# Patient Record
Sex: FEMALE | Race: WHITE | NOT HISPANIC OR LATINO | Employment: OTHER | URBAN - METROPOLITAN AREA
[De-identification: names, ages, dates, MRNs, and addresses within clinical notes are randomized per-mention and may not be internally consistent; named-entity substitution may affect disease eponyms.]

---

## 2017-02-26 ENCOUNTER — APPOINTMENT (OUTPATIENT)
Dept: CT IMAGING | Facility: HOSPITAL | Age: 82
End: 2017-02-26

## 2017-02-26 ENCOUNTER — HOSPITAL ENCOUNTER (OUTPATIENT)
Facility: HOSPITAL | Age: 82
Setting detail: OBSERVATION
Discharge: HOME | End: 2017-02-27
Attending: EMERGENCY MEDICINE | Admitting: HOSPITALIST

## 2017-02-26 DIAGNOSIS — I10 ESSENTIAL HYPERTENSION: Primary | ICD-10-CM

## 2017-02-26 DIAGNOSIS — R51.9 PERSISTENT HEADACHES: ICD-10-CM

## 2017-02-26 LAB
ALBUMIN SERPL-MCNC: 3.9 G/DL (ref 3.5–5.2)
ALBUMIN/GLOB SERPL: 1.4 G/DL
ALP SERPL-CCNC: 93 U/L (ref 39–117)
ALT SERPL W P-5'-P-CCNC: 33 U/L (ref 1–33)
ANION GAP SERPL CALCULATED.3IONS-SCNC: 12 MMOL/L
AST SERPL-CCNC: 25 U/L (ref 1–32)
BASOPHILS # BLD AUTO: 0.02 10*3/MM3 (ref 0–0.2)
BASOPHILS NFR BLD AUTO: 0.3 % (ref 0–1.5)
BILIRUB SERPL-MCNC: <0.2 MG/DL (ref 0.1–1.2)
BUN BLD-MCNC: 34 MG/DL (ref 8–23)
BUN/CREAT SERPL: 30.9 (ref 7–25)
CALCIUM SPEC-SCNC: 9.5 MG/DL (ref 8.2–9.6)
CHLORIDE SERPL-SCNC: 104 MMOL/L (ref 98–107)
CO2 SERPL-SCNC: 26 MMOL/L (ref 22–29)
CREAT BLD-MCNC: 1.1 MG/DL (ref 0.57–1)
DEPRECATED RDW RBC AUTO: 52.4 FL (ref 37–54)
EOSINOPHIL # BLD AUTO: 0.15 10*3/MM3 (ref 0–0.7)
EOSINOPHIL NFR BLD AUTO: 2.1 % (ref 0.3–6.2)
ERYTHROCYTE [DISTWIDTH] IN BLOOD BY AUTOMATED COUNT: 14.1 % (ref 11.7–13)
GFR SERPL CREATININE-BSD FRML MDRD: 46 ML/MIN/1.73
GLOBULIN UR ELPH-MCNC: 2.7 GM/DL
GLUCOSE BLD-MCNC: 105 MG/DL (ref 65–99)
HCT VFR BLD AUTO: 34.5 % (ref 35.6–45.5)
HGB BLD-MCNC: 11.3 G/DL (ref 11.9–15.5)
IMM GRANULOCYTES # BLD: 0.02 10*3/MM3 (ref 0–0.03)
IMM GRANULOCYTES NFR BLD: 0.3 % (ref 0–0.5)
LYMPHOCYTES # BLD AUTO: 2.37 10*3/MM3 (ref 0.9–4.8)
LYMPHOCYTES NFR BLD AUTO: 33.4 % (ref 19.6–45.3)
MCH RBC QN AUTO: 33.1 PG (ref 26.9–32)
MCHC RBC AUTO-ENTMCNC: 32.8 G/DL (ref 32.4–36.3)
MCV RBC AUTO: 101.2 FL (ref 80.5–98.2)
MONOCYTES # BLD AUTO: 0.49 10*3/MM3 (ref 0.2–1.2)
MONOCYTES NFR BLD AUTO: 6.9 % (ref 5–12)
NEUTROPHILS # BLD AUTO: 4.05 10*3/MM3 (ref 1.9–8.1)
NEUTROPHILS NFR BLD AUTO: 57 % (ref 42.7–76)
PLATELET # BLD AUTO: 193 10*3/MM3 (ref 140–500)
PMV BLD AUTO: 10.4 FL (ref 6–12)
POTASSIUM BLD-SCNC: 4.7 MMOL/L (ref 3.5–5.2)
PROT SERPL-MCNC: 6.6 G/DL (ref 6–8.5)
RBC # BLD AUTO: 3.41 10*6/MM3 (ref 3.9–5.2)
SODIUM BLD-SCNC: 142 MMOL/L (ref 136–145)
TROPONIN T SERPL-MCNC: <0.01 NG/ML (ref 0–0.03)
WBC NRBC COR # BLD: 7.1 10*3/MM3 (ref 4.5–10.7)

## 2017-02-26 PROCEDURE — G0378 HOSPITAL OBSERVATION PER HR: HCPCS

## 2017-02-26 PROCEDURE — 84484 ASSAY OF TROPONIN QUANT: CPT | Performed by: EMERGENCY MEDICINE

## 2017-02-26 PROCEDURE — 96374 THER/PROPH/DIAG INJ IV PUSH: CPT

## 2017-02-26 PROCEDURE — 80053 COMPREHEN METABOLIC PANEL: CPT | Performed by: EMERGENCY MEDICINE

## 2017-02-26 PROCEDURE — 70450 CT HEAD/BRAIN W/O DYE: CPT

## 2017-02-26 PROCEDURE — 85025 COMPLETE CBC W/AUTO DIFF WBC: CPT | Performed by: EMERGENCY MEDICINE

## 2017-02-26 PROCEDURE — 99284 EMERGENCY DEPT VISIT MOD MDM: CPT

## 2017-02-26 RX ORDER — GUAIFENESIN 600 MG/1
600 TABLET, EXTENDED RELEASE ORAL 2 TIMES DAILY
COMMUNITY

## 2017-02-26 RX ORDER — LABETALOL HYDROCHLORIDE 5 MG/ML
10 INJECTION, SOLUTION INTRAVENOUS ONCE
Status: COMPLETED | OUTPATIENT
Start: 2017-02-26 | End: 2017-02-26

## 2017-02-26 RX ORDER — MELATONIN
1000
COMMUNITY

## 2017-02-26 RX ORDER — LEVOTHYROXINE SODIUM 0.03 MG/1
75 TABLET ORAL DAILY
COMMUNITY
End: 2020-11-05

## 2017-02-26 RX ORDER — ISOSORBIDE MONONITRATE 60 MG/1
60 TABLET, EXTENDED RELEASE ORAL DAILY
COMMUNITY
End: 2019-09-06 | Stop reason: HOSPADM

## 2017-02-26 RX ORDER — OLMESARTAN MEDOXOMIL 40 MG/1
40 TABLET ORAL DAILY
COMMUNITY
End: 2019-09-24 | Stop reason: HOSPADM

## 2017-02-26 RX ADMIN — LABETALOL HYDROCHLORIDE 10 MG: 5 INJECTION, SOLUTION INTRAVENOUS at 20:52

## 2017-02-27 VITALS
WEIGHT: 103.5 LBS | HEART RATE: 71 BPM | BODY MASS INDEX: 17.25 KG/M2 | HEIGHT: 65 IN | TEMPERATURE: 97.7 F | RESPIRATION RATE: 18 BRPM | DIASTOLIC BLOOD PRESSURE: 71 MMHG | OXYGEN SATURATION: 98 % | SYSTOLIC BLOOD PRESSURE: 155 MMHG

## 2017-02-27 LAB
ANION GAP SERPL CALCULATED.3IONS-SCNC: 14.1 MMOL/L
BUN BLD-MCNC: 30 MG/DL (ref 8–23)
BUN/CREAT SERPL: 28 (ref 7–25)
CALCIUM SPEC-SCNC: 9.3 MG/DL (ref 8.2–9.6)
CHLORIDE SERPL-SCNC: 106 MMOL/L (ref 98–107)
CO2 SERPL-SCNC: 22.9 MMOL/L (ref 22–29)
CREAT BLD-MCNC: 1.07 MG/DL (ref 0.57–1)
DEPRECATED RDW RBC AUTO: 49.7 FL (ref 37–54)
ERYTHROCYTE [DISTWIDTH] IN BLOOD BY AUTOMATED COUNT: 14.3 % (ref 11.7–13)
GFR SERPL CREATININE-BSD FRML MDRD: 48 ML/MIN/1.73
GLUCOSE BLD-MCNC: 94 MG/DL (ref 65–99)
HCT VFR BLD AUTO: 32 % (ref 35.6–45.5)
HGB BLD-MCNC: 10.7 G/DL (ref 11.9–15.5)
MCH RBC QN AUTO: 32 PG (ref 26.9–32)
MCHC RBC AUTO-ENTMCNC: 33.4 G/DL (ref 32.4–36.3)
MCV RBC AUTO: 95.8 FL (ref 80.5–98.2)
PLATELET # BLD AUTO: 183 10*3/MM3 (ref 140–500)
PMV BLD AUTO: 10.6 FL (ref 6–12)
POTASSIUM BLD-SCNC: 4.2 MMOL/L (ref 3.5–5.2)
RBC # BLD AUTO: 3.34 10*6/MM3 (ref 3.9–5.2)
SODIUM BLD-SCNC: 143 MMOL/L (ref 136–145)
WBC NRBC COR # BLD: 8.9 10*3/MM3 (ref 4.5–10.7)

## 2017-02-27 PROCEDURE — G0378 HOSPITAL OBSERVATION PER HR: HCPCS

## 2017-02-27 PROCEDURE — 80048 BASIC METABOLIC PNL TOTAL CA: CPT | Performed by: HOSPITALIST

## 2017-02-27 PROCEDURE — 25010000002 HYDRALAZINE PER 20 MG: Performed by: HOSPITALIST

## 2017-02-27 PROCEDURE — 96375 TX/PRO/DX INJ NEW DRUG ADDON: CPT

## 2017-02-27 PROCEDURE — 85027 COMPLETE CBC AUTOMATED: CPT | Performed by: HOSPITALIST

## 2017-02-27 RX ORDER — METOPROLOL SUCCINATE 25 MG/1
25 TABLET, EXTENDED RELEASE ORAL
Status: DISCONTINUED | OUTPATIENT
Start: 2017-02-27 | End: 2017-02-27 | Stop reason: HOSPADM

## 2017-02-27 RX ORDER — PANTOPRAZOLE SODIUM 40 MG/1
40 TABLET, DELAYED RELEASE ORAL
Status: DISCONTINUED | OUTPATIENT
Start: 2017-02-28 | End: 2017-02-27 | Stop reason: HOSPADM

## 2017-02-27 RX ORDER — ISOSORBIDE MONONITRATE 60 MG/1
60 TABLET, EXTENDED RELEASE ORAL DAILY
Status: DISCONTINUED | OUTPATIENT
Start: 2017-02-27 | End: 2017-02-27 | Stop reason: HOSPADM

## 2017-02-27 RX ORDER — DIPHENOXYLATE HYDROCHLORIDE AND ATROPINE SULFATE 2.5; .025 MG/1; MG/1
1 TABLET ORAL NIGHTLY
Status: DISCONTINUED | OUTPATIENT
Start: 2017-02-27 | End: 2017-02-27 | Stop reason: HOSPADM

## 2017-02-27 RX ORDER — ASPIRIN 81 MG/1
81 TABLET ORAL DAILY
Status: DISCONTINUED | OUTPATIENT
Start: 2017-02-27 | End: 2017-02-27 | Stop reason: HOSPADM

## 2017-02-27 RX ORDER — CLOPIDOGREL BISULFATE 75 MG/1
75 TABLET ORAL DAILY
Status: DISCONTINUED | OUTPATIENT
Start: 2017-02-27 | End: 2017-02-27 | Stop reason: HOSPADM

## 2017-02-27 RX ORDER — AMLODIPINE BESYLATE 10 MG/1
10 TABLET ORAL
Qty: 30 TABLET | Refills: 0 | Status: SHIPPED | OUTPATIENT
Start: 2017-02-27 | End: 2017-03-29

## 2017-02-27 RX ORDER — OLMESARTAN MEDOXOMIL 20 MG/1
40 TABLET ORAL DAILY
Status: DISCONTINUED | OUTPATIENT
Start: 2017-02-27 | End: 2017-02-27 | Stop reason: HOSPADM

## 2017-02-27 RX ORDER — LEVOTHYROXINE SODIUM 0.03 MG/1
12.5 TABLET ORAL DAILY
Status: DISCONTINUED | OUTPATIENT
Start: 2017-02-27 | End: 2017-02-27 | Stop reason: HOSPADM

## 2017-02-27 RX ORDER — CLONIDINE HYDROCHLORIDE 0.1 MG/1
0.1 TABLET ORAL EVERY 4 HOURS PRN
Status: DISCONTINUED | OUTPATIENT
Start: 2017-02-27 | End: 2017-02-27

## 2017-02-27 RX ORDER — FERROUS SULFATE 325(65) MG
325 TABLET ORAL
Status: DISCONTINUED | OUTPATIENT
Start: 2017-02-27 | End: 2017-02-27 | Stop reason: HOSPADM

## 2017-02-27 RX ORDER — HYDRALAZINE HYDROCHLORIDE 20 MG/ML
10 INJECTION INTRAMUSCULAR; INTRAVENOUS EVERY 6 HOURS PRN
Status: DISCONTINUED | OUTPATIENT
Start: 2017-02-27 | End: 2017-02-27

## 2017-02-27 RX ORDER — LABETALOL HYDROCHLORIDE 5 MG/ML
10 INJECTION, SOLUTION INTRAVENOUS ONCE
Status: DISCONTINUED | OUTPATIENT
Start: 2017-02-27 | End: 2017-02-27

## 2017-02-27 RX ORDER — HYDRALAZINE HYDROCHLORIDE 20 MG/ML
10 INJECTION INTRAMUSCULAR; INTRAVENOUS EVERY 4 HOURS PRN
Status: DISCONTINUED | OUTPATIENT
Start: 2017-02-27 | End: 2017-02-27

## 2017-02-27 RX ORDER — OLMESARTAN MEDOXOMIL AND HYDROCHLOROTHIAZIDE 40/12.5 40; 12.5 MG/1; MG/1
1 TABLET ORAL
Status: DISCONTINUED | OUTPATIENT
Start: 2017-02-27 | End: 2017-02-27

## 2017-02-27 RX ORDER — MAGNESIUM OXIDE 400 MG/1
400 TABLET ORAL DAILY
Status: DISCONTINUED | OUTPATIENT
Start: 2017-02-27 | End: 2017-02-27 | Stop reason: HOSPADM

## 2017-02-27 RX ORDER — MONTELUKAST SODIUM 10 MG/1
10 TABLET ORAL NIGHTLY
Status: DISCONTINUED | OUTPATIENT
Start: 2017-02-27 | End: 2017-02-27 | Stop reason: HOSPADM

## 2017-02-27 RX ORDER — ACETAMINOPHEN 325 MG/1
650 TABLET ORAL EVERY 4 HOURS PRN
Status: DISCONTINUED | OUTPATIENT
Start: 2017-02-27 | End: 2017-02-27 | Stop reason: HOSPADM

## 2017-02-27 RX ORDER — MELATONIN
1000 DAILY
Status: DISCONTINUED | OUTPATIENT
Start: 2017-02-27 | End: 2017-02-27 | Stop reason: HOSPADM

## 2017-02-27 RX ORDER — GUAIFENESIN 600 MG/1
600 TABLET, EXTENDED RELEASE ORAL 2 TIMES DAILY
Status: DISCONTINUED | OUTPATIENT
Start: 2017-02-27 | End: 2017-02-27 | Stop reason: HOSPADM

## 2017-02-27 RX ORDER — AMLODIPINE BESYLATE 10 MG/1
10 TABLET ORAL
Status: DISCONTINUED | OUTPATIENT
Start: 2017-02-27 | End: 2017-02-27 | Stop reason: HOSPADM

## 2017-02-27 RX ORDER — METOPROLOL SUCCINATE 25 MG/1
25 TABLET, EXTENDED RELEASE ORAL
Qty: 30 TABLET | Refills: 0 | Status: SHIPPED | OUTPATIENT
Start: 2017-02-27 | End: 2017-03-29

## 2017-02-27 RX ADMIN — ACETAMINOPHEN 650 MG: 325 TABLET ORAL at 02:19

## 2017-02-27 RX ADMIN — AMLODIPINE BESYLATE 10 MG: 10 TABLET ORAL at 08:50

## 2017-02-27 RX ADMIN — METOPROLOL SUCCINATE 25 MG: 25 TABLET, FILM COATED, EXTENDED RELEASE ORAL at 08:50

## 2017-02-27 RX ADMIN — HYDRALAZINE HYDROCHLORIDE 10 MG: 20 INJECTION INTRAMUSCULAR; INTRAVENOUS at 00:37

## 2017-02-27 RX ADMIN — ACETAMINOPHEN 650 MG: 325 TABLET ORAL at 08:55

## 2017-02-27 RX ADMIN — OLMESARTAN MEDOXOMIL-HYDROCHLOROTHIAZIDE 1 TABLET: 40; 12.5 TABLET, FILM COATED ORAL at 08:50

## 2017-04-26 ENCOUNTER — HOSPITAL ENCOUNTER (EMERGENCY)
Facility: HOSPITAL | Age: 82
Discharge: HOME | End: 2017-04-27
Attending: EMERGENCY MEDICINE | Admitting: EMERGENCY MEDICINE

## 2017-04-26 ENCOUNTER — APPOINTMENT (OUTPATIENT)
Dept: CT IMAGING | Facility: HOSPITAL | Age: 82
End: 2017-04-26

## 2017-04-26 DIAGNOSIS — K52.9 ENTERITIS: Primary | ICD-10-CM

## 2017-04-26 LAB
ALBUMIN SERPL-MCNC: 3.1 G/DL (ref 3.5–5.2)
ALBUMIN/GLOB SERPL: 0.9 G/DL
ALP SERPL-CCNC: 66 U/L (ref 39–117)
ALT SERPL W P-5'-P-CCNC: 10 U/L (ref 1–33)
ANION GAP SERPL CALCULATED.3IONS-SCNC: 12.9 MMOL/L
AST SERPL-CCNC: 19 U/L (ref 1–32)
BASOPHILS # BLD AUTO: 0.04 10*3/MM3 (ref 0–0.2)
BASOPHILS NFR BLD AUTO: 0.5 % (ref 0–1.5)
BILIRUB SERPL-MCNC: <0.2 MG/DL (ref 0.1–1.2)
BILIRUB UR QL STRIP: NEGATIVE
BUN BLD-MCNC: 41 MG/DL (ref 8–23)
BUN/CREAT SERPL: 36.9 (ref 7–25)
CALCIUM SPEC-SCNC: 9.7 MG/DL (ref 8.2–9.6)
CHLORIDE SERPL-SCNC: 102 MMOL/L (ref 98–107)
CLARITY UR: CLEAR
CO2 SERPL-SCNC: 23.1 MMOL/L (ref 22–29)
COLOR UR: YELLOW
CREAT BLD-MCNC: 1.11 MG/DL (ref 0.57–1)
DEPRECATED RDW RBC AUTO: 53.3 FL (ref 37–54)
EOSINOPHIL # BLD AUTO: 0.15 10*3/MM3 (ref 0–0.7)
EOSINOPHIL NFR BLD AUTO: 1.7 % (ref 0.3–6.2)
ERYTHROCYTE [DISTWIDTH] IN BLOOD BY AUTOMATED COUNT: 14.8 % (ref 11.7–13)
GFR SERPL CREATININE-BSD FRML MDRD: 46 ML/MIN/1.73
GLOBULIN UR ELPH-MCNC: 3.5 GM/DL
GLUCOSE BLD-MCNC: 119 MG/DL (ref 65–99)
GLUCOSE UR STRIP-MCNC: NEGATIVE MG/DL
HCT VFR BLD AUTO: 31.9 % (ref 35.6–45.5)
HGB BLD-MCNC: 10.3 G/DL (ref 11.9–15.5)
HGB UR QL STRIP.AUTO: NEGATIVE
IMM GRANULOCYTES # BLD: 0.02 10*3/MM3 (ref 0–0.03)
IMM GRANULOCYTES NFR BLD: 0.2 % (ref 0–0.5)
KETONES UR QL STRIP: NEGATIVE
LEUKOCYTE ESTERASE UR QL STRIP.AUTO: NEGATIVE
LIPASE SERPL-CCNC: 70 U/L (ref 13–60)
LYMPHOCYTES # BLD AUTO: 2.91 10*3/MM3 (ref 0.9–4.8)
LYMPHOCYTES NFR BLD AUTO: 33.4 % (ref 19.6–45.3)
MCH RBC QN AUTO: 32 PG (ref 26.9–32)
MCHC RBC AUTO-ENTMCNC: 32.3 G/DL (ref 32.4–36.3)
MCV RBC AUTO: 99.1 FL (ref 80.5–98.2)
MONOCYTES # BLD AUTO: 0.77 10*3/MM3 (ref 0.2–1.2)
MONOCYTES NFR BLD AUTO: 8.8 % (ref 5–12)
NEUTROPHILS # BLD AUTO: 4.83 10*3/MM3 (ref 1.9–8.1)
NEUTROPHILS NFR BLD AUTO: 55.4 % (ref 42.7–76)
NITRITE UR QL STRIP: NEGATIVE
PH UR STRIP.AUTO: 5.5 [PH] (ref 5–8)
PLATELET # BLD AUTO: 225 10*3/MM3 (ref 140–500)
PMV BLD AUTO: 10.3 FL (ref 6–12)
POTASSIUM BLD-SCNC: 4.7 MMOL/L (ref 3.5–5.2)
PROT SERPL-MCNC: 6.6 G/DL (ref 6–8.5)
PROT UR QL STRIP: NEGATIVE
RBC # BLD AUTO: 3.22 10*6/MM3 (ref 3.9–5.2)
SODIUM BLD-SCNC: 138 MMOL/L (ref 136–145)
SP GR UR STRIP: 1.01 (ref 1–1.03)
UROBILINOGEN UR QL STRIP: NORMAL
WBC NRBC COR # BLD: 8.72 10*3/MM3 (ref 4.5–10.7)

## 2017-04-26 PROCEDURE — 36415 COLL VENOUS BLD VENIPUNCTURE: CPT | Performed by: EMERGENCY MEDICINE

## 2017-04-26 PROCEDURE — 99283 EMERGENCY DEPT VISIT LOW MDM: CPT

## 2017-04-26 PROCEDURE — 85025 COMPLETE CBC W/AUTO DIFF WBC: CPT | Performed by: EMERGENCY MEDICINE

## 2017-04-26 PROCEDURE — 74176 CT ABD & PELVIS W/O CONTRAST: CPT

## 2017-04-26 PROCEDURE — 81003 URINALYSIS AUTO W/O SCOPE: CPT | Performed by: EMERGENCY MEDICINE

## 2017-04-26 PROCEDURE — 80053 COMPREHEN METABOLIC PANEL: CPT | Performed by: EMERGENCY MEDICINE

## 2017-04-26 PROCEDURE — 83690 ASSAY OF LIPASE: CPT | Performed by: EMERGENCY MEDICINE

## 2017-04-26 RX ORDER — SODIUM CHLORIDE 0.9 % (FLUSH) 0.9 %
10 SYRINGE (ML) INJECTION AS NEEDED
Status: DISCONTINUED | OUTPATIENT
Start: 2017-04-26 | End: 2017-04-27 | Stop reason: HOSPADM

## 2017-04-26 RX ORDER — AMLODIPINE BESYLATE 10 MG/1
10 TABLET ORAL EVERY EVENING
COMMUNITY
End: 2019-09-24 | Stop reason: HOSPADM

## 2017-04-27 VITALS
TEMPERATURE: 97.8 F | OXYGEN SATURATION: 97 % | DIASTOLIC BLOOD PRESSURE: 80 MMHG | SYSTOLIC BLOOD PRESSURE: 166 MMHG | HEART RATE: 69 BPM | BODY MASS INDEX: 17.75 KG/M2 | HEIGHT: 64 IN | WEIGHT: 104 LBS | RESPIRATION RATE: 16 BRPM

## 2017-05-03 ENCOUNTER — INPATIENT HOSPITAL (OUTPATIENT)
Dept: URBAN - METROPOLITAN AREA HOSPITAL 107 | Facility: HOSPITAL | Age: 82
End: 2017-05-03
Payer: COMMERCIAL

## 2017-05-03 DIAGNOSIS — K21.9 GASTRO-ESOPHAGEAL REFLUX DISEASE WITHOUT ESOPHAGITIS: ICD-10-CM

## 2017-05-03 DIAGNOSIS — R94.5 ABNORMAL RESULTS OF LIVER FUNCTION STUDIES: ICD-10-CM

## 2017-05-03 DIAGNOSIS — R07.89 OTHER CHEST PAIN: ICD-10-CM

## 2017-05-03 DIAGNOSIS — D50.9 IRON DEFICIENCY ANEMIA, UNSPECIFIED: ICD-10-CM

## 2017-05-03 PROCEDURE — 99223 1ST HOSP IP/OBS HIGH 75: CPT | Performed by: INTERNAL MEDICINE

## 2017-05-04 ENCOUNTER — INPATIENT HOSPITAL (OUTPATIENT)
Dept: URBAN - METROPOLITAN AREA HOSPITAL 107 | Facility: HOSPITAL | Age: 82
End: 2017-05-04
Payer: COMMERCIAL

## 2017-05-04 DIAGNOSIS — R07.89 OTHER CHEST PAIN: ICD-10-CM

## 2017-05-04 PROCEDURE — 99231 SBSQ HOSP IP/OBS SF/LOW 25: CPT | Performed by: PHYSICIAN ASSISTANT

## 2017-05-05 ENCOUNTER — INPATIENT HOSPITAL (OUTPATIENT)
Dept: URBAN - METROPOLITAN AREA HOSPITAL 107 | Facility: HOSPITAL | Age: 82
End: 2017-05-05
Payer: COMMERCIAL

## 2017-05-05 DIAGNOSIS — K21.9 GASTRO-ESOPHAGEAL REFLUX DISEASE WITHOUT ESOPHAGITIS: ICD-10-CM

## 2017-05-05 DIAGNOSIS — R07.89 OTHER CHEST PAIN: ICD-10-CM

## 2017-05-05 PROCEDURE — 99232 SBSQ HOSP IP/OBS MODERATE 35: CPT | Performed by: INTERNAL MEDICINE

## 2019-09-01 ENCOUNTER — HOSPITAL ENCOUNTER (INPATIENT)
Facility: HOSPITAL | Age: 84
LOS: 5 days | Discharge: NURSING, REHAB OR LONG-TERM CARE | End: 2019-09-06
Attending: EMERGENCY MEDICINE | Admitting: HOSPITALIST

## 2019-09-01 ENCOUNTER — APPOINTMENT (OUTPATIENT)
Dept: CT IMAGING | Facility: HOSPITAL | Age: 84
End: 2019-09-01

## 2019-09-01 ENCOUNTER — APPOINTMENT (OUTPATIENT)
Dept: GENERAL RADIOLOGY | Facility: HOSPITAL | Age: 84
End: 2019-09-01

## 2019-09-01 DIAGNOSIS — J18.9 PNEUMONIA OF RIGHT LOWER LOBE DUE TO INFECTIOUS ORGANISM: Primary | ICD-10-CM

## 2019-09-01 LAB
ALBUMIN SERPL-MCNC: 3.5 G/DL (ref 3.5–5.2)
ALBUMIN/GLOB SERPL: 1.3 G/DL
ALP SERPL-CCNC: 77 U/L (ref 39–117)
ALT SERPL W P-5'-P-CCNC: 8 U/L (ref 1–33)
ANION GAP SERPL CALCULATED.3IONS-SCNC: 13.5 MMOL/L (ref 5–15)
AST SERPL-CCNC: 18 U/L (ref 1–32)
BASOPHILS # BLD AUTO: 0.05 10*3/MM3 (ref 0–0.2)
BASOPHILS NFR BLD AUTO: 0.3 % (ref 0–1.5)
BILIRUB SERPL-MCNC: 0.3 MG/DL (ref 0.2–1.2)
BILIRUB UR QL STRIP: NEGATIVE
BUN BLD-MCNC: 34 MG/DL (ref 8–23)
BUN/CREAT SERPL: 27.6 (ref 7–25)
CALCIUM SPEC-SCNC: 9.7 MG/DL (ref 8.2–9.6)
CHLORIDE SERPL-SCNC: 105 MMOL/L (ref 98–107)
CLARITY UR: CLEAR
CO2 SERPL-SCNC: 21.5 MMOL/L (ref 22–29)
COLOR UR: YELLOW
CREAT BLD-MCNC: 1.23 MG/DL (ref 0.57–1)
D-LACTATE SERPL-SCNC: 1.2 MMOL/L (ref 0.5–2)
DEPRECATED RDW RBC AUTO: 53.7 FL (ref 37–54)
EOSINOPHIL # BLD AUTO: 0.04 10*3/MM3 (ref 0–0.4)
EOSINOPHIL NFR BLD AUTO: 0.3 % (ref 0.3–6.2)
ERYTHROCYTE [DISTWIDTH] IN BLOOD BY AUTOMATED COUNT: 14.1 % (ref 12.3–15.4)
GFR SERPL CREATININE-BSD FRML MDRD: 40 ML/MIN/1.73
GLOBULIN UR ELPH-MCNC: 2.8 GM/DL
GLUCOSE BLD-MCNC: 120 MG/DL (ref 65–99)
GLUCOSE UR STRIP-MCNC: NEGATIVE MG/DL
HCT VFR BLD AUTO: 34.4 % (ref 34–46.6)
HGB BLD-MCNC: 11 G/DL (ref 12–15.9)
HGB UR QL STRIP.AUTO: NEGATIVE
IMM GRANULOCYTES # BLD AUTO: 0.07 10*3/MM3 (ref 0–0.05)
IMM GRANULOCYTES NFR BLD AUTO: 0.5 % (ref 0–0.5)
KETONES UR QL STRIP: ABNORMAL
LEUKOCYTE ESTERASE UR QL STRIP.AUTO: NEGATIVE
LYMPHOCYTES # BLD AUTO: 2.39 10*3/MM3 (ref 0.7–3.1)
LYMPHOCYTES NFR BLD AUTO: 15.8 % (ref 19.6–45.3)
MCH RBC QN AUTO: 32.8 PG (ref 26.6–33)
MCHC RBC AUTO-ENTMCNC: 32 G/DL (ref 31.5–35.7)
MCV RBC AUTO: 102.7 FL (ref 79–97)
MONOCYTES # BLD AUTO: 1.05 10*3/MM3 (ref 0.1–0.9)
MONOCYTES NFR BLD AUTO: 6.9 % (ref 5–12)
NEUTROPHILS # BLD AUTO: 11.57 10*3/MM3 (ref 1.7–7)
NEUTROPHILS NFR BLD AUTO: 76.2 % (ref 42.7–76)
NITRITE UR QL STRIP: NEGATIVE
NRBC BLD AUTO-RTO: 0 /100 WBC (ref 0–0.2)
PH UR STRIP.AUTO: <=5 [PH] (ref 5–8)
PLATELET # BLD AUTO: 173 10*3/MM3 (ref 140–450)
PMV BLD AUTO: 10.9 FL (ref 6–12)
POTASSIUM BLD-SCNC: 4.7 MMOL/L (ref 3.5–5.2)
PROCALCITONIN SERPL-MCNC: 0.12 NG/ML (ref 0.1–0.25)
PROT SERPL-MCNC: 6.3 G/DL (ref 6–8.5)
PROT UR QL STRIP: NEGATIVE
RBC # BLD AUTO: 3.35 10*6/MM3 (ref 3.77–5.28)
SODIUM BLD-SCNC: 140 MMOL/L (ref 136–145)
SP GR UR STRIP: 1.01 (ref 1–1.03)
UROBILINOGEN UR QL STRIP: ABNORMAL
WBC NRBC COR # BLD: 15.17 10*3/MM3 (ref 3.4–10.8)

## 2019-09-01 PROCEDURE — 84145 PROCALCITONIN (PCT): CPT | Performed by: EMERGENCY MEDICINE

## 2019-09-01 PROCEDURE — 87040 BLOOD CULTURE FOR BACTERIA: CPT | Performed by: EMERGENCY MEDICINE

## 2019-09-01 PROCEDURE — 25010000002 LEVOFLOXACIN PER 250 MG: Performed by: EMERGENCY MEDICINE

## 2019-09-01 PROCEDURE — 80053 COMPREHEN METABOLIC PANEL: CPT | Performed by: EMERGENCY MEDICINE

## 2019-09-01 PROCEDURE — 83605 ASSAY OF LACTIC ACID: CPT | Performed by: EMERGENCY MEDICINE

## 2019-09-01 PROCEDURE — 71045 X-RAY EXAM CHEST 1 VIEW: CPT

## 2019-09-01 PROCEDURE — 85025 COMPLETE CBC W/AUTO DIFF WBC: CPT | Performed by: EMERGENCY MEDICINE

## 2019-09-01 PROCEDURE — 74176 CT ABD & PELVIS W/O CONTRAST: CPT

## 2019-09-01 PROCEDURE — 81003 URINALYSIS AUTO W/O SCOPE: CPT | Performed by: EMERGENCY MEDICINE

## 2019-09-01 PROCEDURE — 99284 EMERGENCY DEPT VISIT MOD MDM: CPT

## 2019-09-01 RX ORDER — CHOLECALCIFEROL (VITAMIN D3) 125 MCG
10 CAPSULE ORAL NIGHTLY
COMMUNITY

## 2019-09-01 RX ORDER — LEVOFLOXACIN 5 MG/ML
500 INJECTION, SOLUTION INTRAVENOUS ONCE
Status: COMPLETED | OUTPATIENT
Start: 2019-09-01 | End: 2019-09-01

## 2019-09-01 RX ORDER — MONTELUKAST SODIUM 10 MG/1
10 TABLET ORAL NIGHTLY
Status: DISCONTINUED | OUTPATIENT
Start: 2019-09-01 | End: 2019-09-06 | Stop reason: HOSPADM

## 2019-09-01 RX ORDER — SODIUM CHLORIDE 9 MG/ML
75 INJECTION, SOLUTION INTRAVENOUS CONTINUOUS
Status: DISCONTINUED | OUTPATIENT
Start: 2019-09-01 | End: 2019-09-03

## 2019-09-01 RX ORDER — L.RHAMNOSUS/B.ANIMALIS(LACTIS) 3B CELL
1 CAPSULE ORAL DAILY
COMMUNITY

## 2019-09-01 RX ORDER — LEVOFLOXACIN 5 MG/ML
750 INJECTION, SOLUTION INTRAVENOUS
Status: DISCONTINUED | OUTPATIENT
Start: 2019-09-03 | End: 2019-09-06

## 2019-09-01 RX ORDER — LORATADINE 10 MG/1
1 CAPSULE, LIQUID FILLED ORAL DAILY
COMMUNITY
End: 2019-09-06 | Stop reason: HOSPADM

## 2019-09-01 RX ORDER — PANTOPRAZOLE SODIUM 40 MG/1
40 TABLET, DELAYED RELEASE ORAL
Status: DISCONTINUED | OUTPATIENT
Start: 2019-09-02 | End: 2019-09-06 | Stop reason: HOSPADM

## 2019-09-01 RX ORDER — CHOLECALCIFEROL (VITAMIN D3) 125 MCG
5 CAPSULE ORAL NIGHTLY PRN
Status: DISCONTINUED | OUTPATIENT
Start: 2019-09-01 | End: 2019-09-02

## 2019-09-01 RX ORDER — ACETAMINOPHEN 500 MG
1000 TABLET ORAL ONCE
Status: COMPLETED | OUTPATIENT
Start: 2019-09-01 | End: 2019-09-01

## 2019-09-01 RX ORDER — SODIUM CHLORIDE 0.9 % (FLUSH) 0.9 %
10 SYRINGE (ML) INJECTION AS NEEDED
Status: DISCONTINUED | OUTPATIENT
Start: 2019-09-01 | End: 2019-09-06 | Stop reason: HOSPADM

## 2019-09-01 RX ORDER — AMLODIPINE BESYLATE 10 MG/1
10 TABLET ORAL EVERY EVENING
Status: DISCONTINUED | OUTPATIENT
Start: 2019-09-01 | End: 2019-09-06 | Stop reason: HOSPADM

## 2019-09-01 RX ORDER — IPRATROPIUM BROMIDE AND ALBUTEROL SULFATE 2.5; .5 MG/3ML; MG/3ML
3 SOLUTION RESPIRATORY (INHALATION) EVERY 4 HOURS PRN
Status: DISCONTINUED | OUTPATIENT
Start: 2019-09-01 | End: 2019-09-06 | Stop reason: HOSPADM

## 2019-09-01 RX ORDER — PANTOPRAZOLE SODIUM 40 MG/1
40 TABLET, DELAYED RELEASE ORAL 2 TIMES DAILY
COMMUNITY

## 2019-09-01 RX ORDER — MELATONIN
1000 EVERY EVENING
Status: DISCONTINUED | OUTPATIENT
Start: 2019-09-01 | End: 2019-09-06 | Stop reason: HOSPADM

## 2019-09-01 RX ADMIN — MONTELUKAST SODIUM 10 MG: 10 TABLET, FILM COATED ORAL at 23:58

## 2019-09-01 RX ADMIN — VITAMIN D, TAB 1000IU (100/BT) 1000 UNITS: 25 TAB at 23:58

## 2019-09-01 RX ADMIN — METOPROLOL TARTRATE 25 MG: 25 TABLET ORAL at 23:58

## 2019-09-01 RX ADMIN — Medication 5 MG: at 23:59

## 2019-09-01 RX ADMIN — LEVOFLOXACIN 500 MG: 5 INJECTION, SOLUTION INTRAVENOUS at 19:27

## 2019-09-01 RX ADMIN — AMLODIPINE BESYLATE 10 MG: 10 TABLET ORAL at 23:58

## 2019-09-01 RX ADMIN — SODIUM CHLORIDE 75 ML/HR: 9 INJECTION, SOLUTION INTRAVENOUS at 22:20

## 2019-09-01 RX ADMIN — ACETAMINOPHEN 1000 MG: 500 TABLET, FILM COATED ORAL at 16:39

## 2019-09-01 NOTE — ED TRIAGE NOTES
Pt recently began abx for UTI, reports fever began this morning. TMAX 99.9 at home. Pt also c/o nausea.

## 2019-09-01 NOTE — ED PROVIDER NOTES
EMERGENCY DEPARTMENT ENCOUNTER    Room Number:  22/22  PCP: Bartolo Gardner MD  Historian: Pt, daughter  History Limited By: poor historian      HPI  Chief Complaint: Fever  Context: Dinah Mcnamara is a 89 y.o. female who presents to the ED c/o fever today (Tmax 100.5). Daughter states pt began complaining of abd pain and general malaise 1 week ago. Pt saw her PCP 8/27/19 and was dx with a UTI. She was prescribed Nitrofurantion and has been taking it as prescribed. Pt c/o fatigue and nausea. Family denies cough, SOA, sore throat, rash, current abd pain, vomiting, and dysuria. Pt's last dose of Tylenol was at 8:30AM.       Location: generalized  Character: Tmax 100.5  Duration: 1 days  Severity: moderate   Progression: worsening   Aggravating Factors: none   Alleviating Factors: none      PAST MEDICAL HISTORY  Active Ambulatory Problems     Diagnosis Date Noted   • Acute gallstone pancreatitis 05/12/2016   • SIRS (systemic inflammatory response syndrome) (CMS/HCC) 05/13/2016   • Colitis 05/13/2016   • Hypokalemia 05/13/2016   • Dehydration 05/13/2016   • CAD (coronary artery disease) 05/13/2016   • HTN (hypertension) 05/13/2016   • Essential hypertension 02/26/2017     Resolved Ambulatory Problems     Diagnosis Date Noted   • No Resolved Ambulatory Problems     Past Medical History:   Diagnosis Date   • Blindness    • CAD (coronary artery disease)    • Diverticulitis    • Hypertension    • Stroke (CMS/HCC)          PAST SURGICAL HISTORY  Past Surgical History:   Procedure Laterality Date   • CARDIAC SURGERY     • CHOLECYSTECTOMY     • CORONARY ANGIOPLASTY WITH STENT PLACEMENT     • SHOULDER SURGERY           FAMILY HISTORY  No family history on file.      SOCIAL HISTORY  Social History     Socioeconomic History   • Marital status:      Spouse name: Not on file   • Number of children: Not on file   • Years of education: Not on file   • Highest education level: Not on file   Tobacco Use   • Smoking status:  Never Smoker   Substance and Sexual Activity   • Alcohol use: Yes     Alcohol/week: 0.6 oz     Types: 1 Glasses of wine per week     Comment: holidays   • Drug use: No   • Sexual activity: No         ALLERGIES  Augmentin [amoxicillin-pot clavulanate]; Codeine; Hydralazine hcl; and Sulfa antibiotics        REVIEW OF SYSTEMS  Review of Systems   Constitutional: Positive for fatigue and fever (tmax 100.5).        General malaise +   HENT: Negative for sore throat.    Eyes: Negative.    Respiratory: Negative for cough and shortness of breath.    Cardiovascular: Negative for chest pain.   Gastrointestinal: Positive for nausea. Negative for abdominal pain, diarrhea and vomiting.   Genitourinary: Negative for dysuria.   Musculoskeletal: Negative for neck pain.   Skin: Negative for rash.   Allergic/Immunologic: Negative.    Neurological: Negative for weakness, numbness and headaches.   Hematological: Negative.    Psychiatric/Behavioral: Negative.    All other systems reviewed and are negative.           PHYSICAL EXAM  ED Triage Vitals [09/01/19 1349]   Temp Heart Rate Resp BP SpO2   100.5 °F (38.1 °C) 61 17 -- 94 %      Temp src Heart Rate Source Patient Position BP Location FiO2 (%)   Tympanic -- -- -- --       Physical Exam   Constitutional: She is oriented to person, place, and time. No distress.   HENT:   Head: Normocephalic and atraumatic.   Mouth/Throat: Mucous membranes are dry.   Eyes: EOM are normal. Pupils are equal, round, and reactive to light.   Neck: Normal range of motion. Neck supple.   Cardiovascular: Normal rate, regular rhythm and normal heart sounds.   Pulmonary/Chest: Effort normal and breath sounds normal. No respiratory distress.   Abdominal: Soft. There is tenderness (LLQ). There is no rebound and no guarding.   Musculoskeletal: Normal range of motion. She exhibits no edema.   Neurological: She is alert and oriented to person, place, and time. She has normal sensation and normal strength.   Skin:  Skin is warm and dry. No rash noted.   Psychiatric: Mood and affect normal.   Nursing note and vitals reviewed.          LAB RESULTS  Recent Results (from the past 24 hour(s))   Comprehensive Metabolic Panel    Collection Time: 09/01/19  3:59 PM   Result Value Ref Range    Glucose 120 (H) 65 - 99 mg/dL    BUN 34 (H) 8 - 23 mg/dL    Creatinine 1.23 (H) 0.57 - 1.00 mg/dL    Sodium 140 136 - 145 mmol/L    Potassium 4.7 3.5 - 5.2 mmol/L    Chloride 105 98 - 107 mmol/L    CO2 21.5 (L) 22.0 - 29.0 mmol/L    Calcium 9.7 (H) 8.2 - 9.6 mg/dL    Total Protein 6.3 6.0 - 8.5 g/dL    Albumin 3.50 3.50 - 5.20 g/dL    ALT (SGPT) 8 1 - 33 U/L    AST (SGOT) 18 1 - 32 U/L    Alkaline Phosphatase 77 39 - 117 U/L    Total Bilirubin 0.3 0.2 - 1.2 mg/dL    eGFR Non African Amer 40 (L) >60 mL/min/1.73    Globulin 2.8 gm/dL    A/G Ratio 1.3 g/dL    BUN/Creatinine Ratio 27.6 (H) 7.0 - 25.0    Anion Gap 13.5 5.0 - 15.0 mmol/L   Lactic Acid, Plasma    Collection Time: 09/01/19  3:59 PM   Result Value Ref Range    Lactate 1.2 0.5 - 2.0 mmol/L   Procalcitonin    Collection Time: 09/01/19  3:59 PM   Result Value Ref Range    Procalcitonin 0.12 0.10 - 0.25 ng/mL   CBC Auto Differential    Collection Time: 09/01/19  3:59 PM   Result Value Ref Range    WBC 15.17 (H) 3.40 - 10.80 10*3/mm3    RBC 3.35 (L) 3.77 - 5.28 10*6/mm3    Hemoglobin 11.0 (L) 12.0 - 15.9 g/dL    Hematocrit 34.4 34.0 - 46.6 %    .7 (H) 79.0 - 97.0 fL    MCH 32.8 26.6 - 33.0 pg    MCHC 32.0 31.5 - 35.7 g/dL    RDW 14.1 12.3 - 15.4 %    RDW-SD 53.7 37.0 - 54.0 fl    MPV 10.9 6.0 - 12.0 fL    Platelets 173 140 - 450 10*3/mm3    Neutrophil % 76.2 (H) 42.7 - 76.0 %    Lymphocyte % 15.8 (L) 19.6 - 45.3 %    Monocyte % 6.9 5.0 - 12.0 %    Eosinophil % 0.3 0.3 - 6.2 %    Basophil % 0.3 0.0 - 1.5 %    Immature Grans % 0.5 0.0 - 0.5 %    Neutrophils, Absolute 11.57 (H) 1.70 - 7.00 10*3/mm3    Lymphocytes, Absolute 2.39 0.70 - 3.10 10*3/mm3    Monocytes, Absolute 1.05 (H) 0.10 -  0.90 10*3/mm3    Eosinophils, Absolute 0.04 0.00 - 0.40 10*3/mm3    Basophils, Absolute 0.05 0.00 - 0.20 10*3/mm3    Immature Grans, Absolute 0.07 (H) 0.00 - 0.05 10*3/mm3    nRBC 0.0 0.0 - 0.2 /100 WBC   Urinalysis With Microscopic If Indicated (No Culture) - Urine, Catheter    Collection Time: 09/01/19  4:43 PM   Result Value Ref Range    Color, UA Yellow Yellow, Straw    Appearance, UA Clear Clear    pH, UA <=5.0 5.0 - 8.0    Specific Gravity, UA 1.013 1.005 - 1.030    Glucose, UA Negative Negative    Ketones, UA 15 mg/dL (1+) (A) Negative    Bilirubin, UA Negative Negative    Blood, UA Negative Negative    Protein, UA Negative Negative    Leuk Esterase, UA Negative Negative    Nitrite, UA Negative Negative    Urobilinogen, UA 0.2 E.U./dL 0.2 - 1.0 E.U./dL       Ordered the above labs and reviewed the results.        RADIOLOGY  CT Abdomen Pelvis Without Contrast   Preliminary Result   1.  Findings of aspiration pneumonia in the right lower lobe in the   appropriate clinical context and correlation with patient history is   recommended.   2.  Irregular pulmonary opacification and tree-in-bud nodularity within   the right middle lobe and lingula, as before.   3.  Diffuse anasarca with subtle fat stranding throughout the mesentery   and subcutaneous tissues as above. No definite findings of   diverticulitis.   4.  Other findings as above.       The above findings were discussed with Dr. Urbina by telephone by   Shahab Michaels at 6:30 PM on 09/01/2019    .                  XR Chest 1 View   Final Result   No focal pulmonary consolidation. Slight blunting of the   costophrenic angles. Borderline heart size. Tortuous aorta. Follow-up as   clinically indicated.       This report was finalized on 9/1/2019 4:16 PM by Dr. Tavon Moser M.D.               Ordered the above noted radiological studies. Reviewed by me in PACS.          PROCEDURES  Procedures    MEDICATIONS GIVEN IN ER  Medications   sodium chloride  0.9 % flush 10 mL (not administered)   acetaminophen (TYLENOL) tablet 1,000 mg (1,000 mg Oral Given 9/1/19 1639)   levoFLOXacin (LEVAQUIN) 500 mg/100 mL D5W (premix) (LEVAQUIN) 500 mg (500 mg Intravenous New Bag 9/1/19 1927)             PROGRESS AND CONSULTS  ED Course as of Sep 01 1930   Sun Sep 01, 2019   1850 6:50 PM  Patient here for fever.  Told has UTI but urine here normal. CT abdomen shows pneumonia that is concerning for aspiration pneumonia.  Also appears to be dehydrated.  Will give fluids.  Discussed with Dr. Chin who will admit.  Asking for levaquin.  [SL]      ED Course User Index  [SL] Arnulfo Urbina MD   3:54 PM  Labs and CXR ordered. Tylenol ordered for fever.     4:37 PM  CT abd pelvis ordered for evaluation.         MEDICAL DECISION MAKING      MDM  Number of Diagnoses or Management Options     Amount and/or Complexity of Data Reviewed  Clinical lab tests: ordered and reviewed (WBC-15.17  BUN-34  Creatinine-1.23)  Tests in the radiology section of CPT®: ordered and reviewed (CXR-negative acute)  Decide to obtain previous medical records or to obtain history from someone other than the patient: yes  Review and summarize past medical records: yes               DIAGNOSIS  Final diagnoses:   Pneumonia of right lower lobe due to infectious organism (CMS/HCC)           DISPOSITION  ADMISSION    Discussed treatment plan and reason for admission with pt/family and admitting physician.  Pt/family voiced understanding of the plan for admission for further testing/treatment as needed.           Latest Documented Vital Signs:  As of 7:30 PM  BP- 142/65 HR- 69 Temp- 100.5 °F (38.1 °C) (Tympanic) O2 sat- 98%        --  Documentation assistance provided by jonathon Weir for Dr. Ciara MD.  Information recorded by the scrterell was done at my direction and has been verified and validated by me.       Saritha Weir  09/01/19 1803       Arnulfo Urbina MD  09/01/19 1930

## 2019-09-01 NOTE — ED NOTES
Patient very hard stick, was stuck x4 by this rn and another RN tried also. I got one blue top for the second set of cultures and it was a short draw      Lidia Vallecillo, YOHAN  09/01/19 1920

## 2019-09-02 LAB
ANION GAP SERPL CALCULATED.3IONS-SCNC: 11.1 MMOL/L (ref 5–15)
B PARAPERT DNA SPEC QL NAA+PROBE: NOT DETECTED
B PERT DNA SPEC QL NAA+PROBE: NOT DETECTED
BUN BLD-MCNC: 30 MG/DL (ref 8–23)
BUN/CREAT SERPL: 29.1 (ref 7–25)
C PNEUM DNA NPH QL NAA+NON-PROBE: NOT DETECTED
CALCIUM SPEC-SCNC: 9.5 MG/DL (ref 8.2–9.6)
CHLORIDE SERPL-SCNC: 106 MMOL/L (ref 98–107)
CO2 SERPL-SCNC: 21.9 MMOL/L (ref 22–29)
CREAT BLD-MCNC: 1.03 MG/DL (ref 0.57–1)
DEPRECATED RDW RBC AUTO: 53 FL (ref 37–54)
ERYTHROCYTE [DISTWIDTH] IN BLOOD BY AUTOMATED COUNT: 13.9 % (ref 12.3–15.4)
FLUAV H1 2009 PAND RNA NPH QL NAA+PROBE: NOT DETECTED
FLUAV H1 HA GENE NPH QL NAA+PROBE: NOT DETECTED
FLUAV H3 RNA NPH QL NAA+PROBE: NOT DETECTED
FLUAV SUBTYP SPEC NAA+PROBE: NOT DETECTED
FLUBV RNA ISLT QL NAA+PROBE: NOT DETECTED
GFR SERPL CREATININE-BSD FRML MDRD: 50 ML/MIN/1.73
GLUCOSE BLD-MCNC: 93 MG/DL (ref 65–99)
HADV DNA SPEC NAA+PROBE: NOT DETECTED
HCOV 229E RNA SPEC QL NAA+PROBE: NOT DETECTED
HCOV HKU1 RNA SPEC QL NAA+PROBE: NOT DETECTED
HCOV NL63 RNA SPEC QL NAA+PROBE: NOT DETECTED
HCOV OC43 RNA SPEC QL NAA+PROBE: NOT DETECTED
HCT VFR BLD AUTO: 30.3 % (ref 34–46.6)
HGB BLD-MCNC: 9.6 G/DL (ref 12–15.9)
HMPV RNA NPH QL NAA+NON-PROBE: NOT DETECTED
HPIV1 RNA SPEC QL NAA+PROBE: NOT DETECTED
HPIV2 RNA SPEC QL NAA+PROBE: NOT DETECTED
HPIV3 RNA NPH QL NAA+PROBE: NOT DETECTED
HPIV4 P GENE NPH QL NAA+PROBE: NOT DETECTED
M PNEUMO IGG SER IA-ACNC: NOT DETECTED
MCH RBC QN AUTO: 32.5 PG (ref 26.6–33)
MCHC RBC AUTO-ENTMCNC: 31.7 G/DL (ref 31.5–35.7)
MCV RBC AUTO: 102.7 FL (ref 79–97)
PLATELET # BLD AUTO: 157 10*3/MM3 (ref 140–450)
PMV BLD AUTO: 10.8 FL (ref 6–12)
POTASSIUM BLD-SCNC: 4.1 MMOL/L (ref 3.5–5.2)
RBC # BLD AUTO: 2.95 10*6/MM3 (ref 3.77–5.28)
RHINOVIRUS RNA SPEC NAA+PROBE: NOT DETECTED
RSV RNA NPH QL NAA+NON-PROBE: NOT DETECTED
SODIUM BLD-SCNC: 139 MMOL/L (ref 136–145)
WBC NRBC COR # BLD: 9.73 10*3/MM3 (ref 3.4–10.8)

## 2019-09-02 PROCEDURE — 85027 COMPLETE CBC AUTOMATED: CPT | Performed by: HOSPITALIST

## 2019-09-02 PROCEDURE — 92610 EVALUATE SWALLOWING FUNCTION: CPT

## 2019-09-02 PROCEDURE — 94640 AIRWAY INHALATION TREATMENT: CPT

## 2019-09-02 PROCEDURE — 0100U HC BIOFIRE FILMARRAY RESP PANEL 2: CPT | Performed by: HOSPITALIST

## 2019-09-02 PROCEDURE — 80048 BASIC METABOLIC PNL TOTAL CA: CPT | Performed by: HOSPITALIST

## 2019-09-02 RX ORDER — CHOLECALCIFEROL (VITAMIN D3) 125 MCG
5 CAPSULE ORAL NIGHTLY PRN
Status: DISCONTINUED | OUTPATIENT
Start: 2019-09-02 | End: 2019-09-06 | Stop reason: HOSPADM

## 2019-09-02 RX ORDER — CETIRIZINE HYDROCHLORIDE 10 MG/1
5 TABLET ORAL DAILY
Status: DISCONTINUED | OUTPATIENT
Start: 2019-09-02 | End: 2019-09-02

## 2019-09-02 RX ORDER — CHOLECALCIFEROL (VITAMIN D3) 125 MCG
5 CAPSULE ORAL NIGHTLY
Status: DISCONTINUED | OUTPATIENT
Start: 2019-09-02 | End: 2019-09-02

## 2019-09-02 RX ORDER — ISOSORBIDE MONONITRATE 30 MG/1
30 TABLET, EXTENDED RELEASE ORAL DAILY
Status: DISCONTINUED | OUTPATIENT
Start: 2019-09-02 | End: 2019-09-06 | Stop reason: HOSPADM

## 2019-09-02 RX ORDER — DIPHENOXYLATE HYDROCHLORIDE AND ATROPINE SULFATE 2.5; .025 MG/1; MG/1
1 TABLET ORAL DAILY
Status: DISCONTINUED | OUTPATIENT
Start: 2019-09-02 | End: 2019-09-02

## 2019-09-02 RX ORDER — CLOPIDOGREL BISULFATE 75 MG/1
75 TABLET ORAL DAILY
Status: DISCONTINUED | OUTPATIENT
Start: 2019-09-02 | End: 2019-09-06 | Stop reason: HOSPADM

## 2019-09-02 RX ORDER — GUAIFENESIN 600 MG/1
600 TABLET, EXTENDED RELEASE ORAL EVERY EVENING
Status: DISCONTINUED | OUTPATIENT
Start: 2019-09-02 | End: 2019-09-06 | Stop reason: HOSPADM

## 2019-09-02 RX ORDER — LEVOFLOXACIN 5 MG/ML
500 INJECTION, SOLUTION INTRAVENOUS ONCE
Status: DISCONTINUED | OUTPATIENT
Start: 2019-09-02 | End: 2019-09-02

## 2019-09-02 RX ORDER — ASPIRIN 81 MG/1
81 TABLET, CHEWABLE ORAL DAILY
Status: DISCONTINUED | OUTPATIENT
Start: 2019-09-02 | End: 2019-09-06 | Stop reason: HOSPADM

## 2019-09-02 RX ORDER — ACETAMINOPHEN 500 MG
1000 TABLET ORAL ONCE
Status: DISCONTINUED | OUTPATIENT
Start: 2019-09-02 | End: 2019-09-02

## 2019-09-02 RX ORDER — IPRATROPIUM BROMIDE AND ALBUTEROL SULFATE 2.5; .5 MG/3ML; MG/3ML
3 SOLUTION RESPIRATORY (INHALATION)
Status: DISCONTINUED | OUTPATIENT
Start: 2019-09-02 | End: 2019-09-06 | Stop reason: HOSPADM

## 2019-09-02 RX ORDER — LOSARTAN POTASSIUM 25 MG/1
25 TABLET ORAL
Status: DISCONTINUED | OUTPATIENT
Start: 2019-09-02 | End: 2019-09-03

## 2019-09-02 RX ORDER — LEVOTHYROXINE SODIUM 0.07 MG/1
75 TABLET ORAL
Status: DISCONTINUED | OUTPATIENT
Start: 2019-09-03 | End: 2019-09-06 | Stop reason: HOSPADM

## 2019-09-02 RX ORDER — FERROUS SULFATE 325(65) MG
325 TABLET ORAL 2 TIMES DAILY
Status: DISCONTINUED | OUTPATIENT
Start: 2019-09-02 | End: 2019-09-06 | Stop reason: HOSPADM

## 2019-09-02 RX ORDER — L.ACID,PARA/B.BIFIDUM/S.THERM 8B CELL
1 CAPSULE ORAL DAILY
Status: DISCONTINUED | OUTPATIENT
Start: 2019-09-02 | End: 2019-09-06 | Stop reason: HOSPADM

## 2019-09-02 RX ORDER — ISOSORBIDE MONONITRATE 60 MG/1
60 TABLET, EXTENDED RELEASE ORAL DAILY
Status: DISCONTINUED | OUTPATIENT
Start: 2019-09-02 | End: 2019-09-02

## 2019-09-02 RX ADMIN — PANTOPRAZOLE SODIUM 40 MG: 40 TABLET, DELAYED RELEASE ORAL at 06:40

## 2019-09-02 RX ADMIN — Medication 1 CAPSULE: at 14:14

## 2019-09-02 RX ADMIN — METOPROLOL TARTRATE 25 MG: 25 TABLET ORAL at 08:24

## 2019-09-02 RX ADMIN — FERROUS SULFATE TAB 325 MG (65 MG ELEMENTAL FE) 325 MG: 325 (65 FE) TAB at 20:20

## 2019-09-02 RX ADMIN — FERROUS SULFATE TAB 325 MG (65 MG ELEMENTAL FE) 325 MG: 325 (65 FE) TAB at 14:14

## 2019-09-02 RX ADMIN — Medication 400 MG: at 14:14

## 2019-09-02 RX ADMIN — CLOPIDOGREL 75 MG: 75 TABLET, FILM COATED ORAL at 14:14

## 2019-09-02 RX ADMIN — GUAIFENESIN 600 MG: 600 TABLET, EXTENDED RELEASE ORAL at 18:12

## 2019-09-02 RX ADMIN — METOPROLOL TARTRATE 25 MG: 25 TABLET ORAL at 20:20

## 2019-09-02 RX ADMIN — IPRATROPIUM BROMIDE AND ALBUTEROL SULFATE 3 ML: 2.5; .5 SOLUTION RESPIRATORY (INHALATION) at 15:09

## 2019-09-02 RX ADMIN — LOSARTAN POTASSIUM 25 MG: 25 TABLET, FILM COATED ORAL at 14:14

## 2019-09-02 RX ADMIN — MONTELUKAST SODIUM 10 MG: 10 TABLET, FILM COATED ORAL at 20:20

## 2019-09-02 RX ADMIN — VITAMIN D, TAB 1000IU (100/BT) 1000 UNITS: 25 TAB at 18:12

## 2019-09-02 RX ADMIN — ASPIRIN 81 MG: 81 TABLET, CHEWABLE ORAL at 14:14

## 2019-09-02 RX ADMIN — SODIUM CHLORIDE 75 ML/HR: 9 INJECTION, SOLUTION INTRAVENOUS at 12:00

## 2019-09-02 RX ADMIN — AMLODIPINE BESYLATE 10 MG: 10 TABLET ORAL at 18:12

## 2019-09-02 RX ADMIN — ISOSORBIDE MONONITRATE 30 MG: 30 TABLET ORAL at 14:14

## 2019-09-02 NOTE — THERAPY EVALUATION
Acute Care - Speech Language Pathology   Swallow Initial Evaluation [facility]     Patient Name: Dinah Mcnamara  : 1922  MRN: [id number]  Today's Date: 2019               Admit Date: 2019    Visit Dx:     ICD-10-CM ICD-9-CM   1. Pneumonia of right lower lobe due to infectious organism (CMS/HCC) J18.1 486     Patient Active Problem List   Diagnosis   • Acute gallstone pancreatitis   • SIRS (systemic inflammatory response syndrome) (CMS/HCC)   • Colitis   • Hypokalemia   • Dehydration   • CAD (coronary artery disease)   • HTN (hypertension)   • Essential hypertension   • Pneumonia of right lower lobe due to infectious organism (CMS/HCC)     Past Medical History:   Diagnosis Date   • Blindness     rt eye   • CAD (coronary artery disease)    • Diverticulitis    • Hypertension    • Pancreatitis    • Stroke (CMS/HCC)      Past Surgical History:   Procedure Laterality Date   • CARDIAC SURGERY     • CHOLECYSTECTOMY     • CORONARY ANGIOPLASTY WITH STENT PLACEMENT     • SHOULDER SURGERY          SWALLOW EVALUATION (last 72 hours)      [facility] Adult Swallow Evaluation     Row Name 19 1400                   Rehab Evaluation    Document Type  evaluation  -MG        Subjective Information  no complaints  -MG        Patient Observations  alert;cooperative;agree to therapy  -MG        Patient/Family Observations  daughters present  -MG        Patient Effort  excellent  -MG        Symptoms Noted During/After Treatment  none  -MG           General Information    Patient Profile Reviewed  yes  -MG        Pertinent History Of Current Problem  pna  -MG        Current Method of Nutrition  regular textures;thin liquids  -MG        Precautions/Limitations, Vision  WFL with corrective lenses  -MG        Precautions/Limitations, Hearing  WFL;for purposes of eval  -MG        Prior Level of Function-Communication  WFL  -MG        Prior Level of Function-Swallowing  no diet consistency restrictions  -MG        Plans/Goals  Discussed with  patient;patient and family;agreed upon  -MG        Barriers to Rehab  none identified  -MG        Patient's Goals for Discharge  patient did not state  -MG        Family Goals for Discharge  family did not state  -MG           Oral Motor and Function    Dentition Assessment  natural, present and adequate  -MG        Secretion Management  WNL/WFL  -MG        Mucosal Quality  moist, healthy  -MG        Volitional Swallow  WFL  -MG           Oral Musculature and Cranial Nerve Assessment    Oral Motor General Assessment  WFL  -MG           General Eating/Swallowing Observations    Respiratory Support Currently in Use  room air  -MG        Eating/Swallowing Skills  self-fed;fed by SLP;appropriate self-feeding skills observed  -MG        Positioning During Eating  upright in chair  -MG        Utensils Used  spoon;cup;straw  -MG        Consistencies Trialed  regular textures;soft textures;pureed;thin liquids  -MG           Clinical Swallow Eval    Oral Prep Phase  impaired  -MG        Oral Transit  impaired  -MG        Oral Residue  impaired  -MG        Pharyngeal Phase  no overt signs/symptoms of pharyngeal impairment  -MG        Esophageal Phase  unremarkable  -MG           Oral Prep Concerns    Oral Prep Concerns  prolonged mastication;bolus removed from mouth manually  -MG        Prolonged Mastication  mechanical soft  -MG        Bolus Removed from Mouth Manually  mechanical soft  -MG           Oral Transit Concerns    Oral Transit Concerns  increased oral transit time  -MG        Increased Oral Transit Time  mechanical soft  -MG           Oral Residue Concerns    Oral Residue Concerns  diffuse residue throughout oral cavity  -MG        Diffuse Residue Throughout Oral Cavity  mechanical soft;regular consistencies  -MG           Clinical Impression    SLP Swallowing Diagnosis  mild;oral dysfunction;functional pharyngeal phase  -MG        Functional Impact  risk of aspiration/pneumonia  -MG        Rehab  Potential/Prognosis, Swallowing  adequate, monitor progress closely  -MG        Swallow Criteria for Skilled Therapeutic Interventions Met  demonstrates skilled criteria  -MG           Recommendations    Therapy Frequency (Swallow)  evaluation only  -MG        SLP Diet Recommendation  regular textures;thin liquids  -MG        Recommended Precautions and Strategies  upright posture during/after eating;small bites of food and sips of liquid;check mouth frequently for oral residue/pocketing  -MG        SLP Rec. for Method of Medication Administration  meds whole;with thin liquids  -MG        Monitor for Signs of Aspiration  yes;notify SLP if any concerns;cough;throat clearing  -MG        Anticipated Dischage Disposition  unknown  -MG          User Key  (r) = Recorded By, (t) = Taken By, (c) = Cosigned By    Initials Name Effective Dates    MG Jenae Curiel MA,CCC-SLP 06/08/18 -           EDUCATION  The patient has been educated in the following areas:   Dysphagia (Swallowing Impairment) Oral Care/Hydration.    SLP Recommendation and Plan  SLP Swallowing Diagnosis: mild, oral dysfunction, functional pharyngeal phase  SLP Diet Recommendation: regular textures, thin liquids  Recommended Precautions and Strategies: upright posture during/after eating, small bites of food and sips of liquid, check mouth frequently for oral residue/pocketing  SLP Rec. for Method of Medication Administration: meds whole, with thin liquids     Monitor for Signs of Aspiration: yes, notify SLP if any concerns, cough, throat clearing     Swallow Criteria for Skilled Therapeutic Interventions Met: demonstrates skilled criteria  Anticipated Dischage Disposition: unknown  Rehab Potential/Prognosis, Swallowing: adequate, monitor progress closely  Therapy Frequency (Swallow): evaluation only          Plan of Care Reviewed With: patient, family, daughter  Progress: no change  Outcome Summary: Bedside swallow evaluation: Pt alert and cooperative up  in chair, daughters present.  Pt on regular diet and enjoys daily gipson, eggs and toast per family. Family reports that pt coughs around one time per week on food/drink. Pt/family do not want to alter pt's diet. Pt trialed today with ice, iced tea via straw, puree, diced peaches and cracker. Pt w/ prolonged mastication and eventually spat out bits of peach. Family stated they do see pocketing at times. Family educated about pocketing dangers and strategies. Other consistencies with acceptable oral phase. Timely swallow, good laryngeal elevation. Dry vocal quality with all consistencies, no overt s/s aspiration. Rec. continue regular diet w/ thin liquids per pt request. Family educated about possible need to downgrade diet to increase pt input and reduce pocketing. Family very involved and demosntrated understanding of strategies.          SLP Outcome Measures (last 72 hours)      SLP Outcome Measures     Row Name 09/02/19 1500             SLP Outcome Measures    Outcome Measure Used?  Adult NOMS  -MG         Adult FCM Scores    FCM Chosen  Swallowing  -MG      Swallowing FCM Score  6  -MG        User Key  (r) = Recorded By, (t) = Taken By, (c) = Cosigned By    Initials Name Effective Dates    Jenae Moreno MA,CCC-SLP 06/08/18 -            Time Calculation:   Time Calculation- SLP     Row Name 09/02/19 1503             Time Calculation- SLP    SLP Received On  09/02/19  -MG        User Key  (r) = Recorded By, (t) = Taken By, (c) = Cosigned By    Initials Name Provider Type    MG Jenae Curiel MA,CCC-SLP Speech and Language Pathologist          Therapy Charges for Today     Code Description Service Date Service Provider Modifiers Qty    [id number]  ST EVAL ORAL PHARYNG SWALLOW 3 9/2/2019 Jenae Curiel MA,CCC-SLP GN 1               Jenae Curiel MA,TA-SLP  9/2/2019

## 2019-09-02 NOTE — PROGRESS NOTES
Discharge Planning Assessment  [facility]     Patient Name: Dinah Mcnamara  MRN: [id number]  Today's Date: 9/2/2019    Admit Date: 9/1/2019    Discharge Needs Assessment     Row Name 09/02/19 1113       Living Environment    Lives With  alone    Unique Family Situation  daughter Brisa Denise lives down the street and assist patient with meals and transportation to/from appts    Current Living Arrangements  home/apartment/condo    Primary Care Provided by  self;child(georgina)    Provides Primary Care For  no one    Family Caregiver if Needed  child(georgina), adult    Quality of Family Relationships  supportive;involved;helpful    Able to Return to Prior Arrangements  yes       Resource/Environmental Concerns    Resource/Environmental Concerns  none    Transportation Concerns  car, none       Transition Planning    Patient/Family Anticipates Transition to  home with family;home with help/services    Patient/Family Anticipated Services at Transition  home health care    Transportation Anticipated  family or friend will provide       Discharge Needs Assessment    Concerns to be Addressed  basic needs;home safety    Equipment Currently Used at Home  walker, rolling    Anticipated Changes Related to Illness  none    Equipment Needed After Discharge  none    Outpatient/Agency/Support Group Needs  homecare agency    Discharge Facility/Level of Care Needs  home with home health    Offered/Gave Vendor List  yes    Current Discharge Risk  lives alone        Discharge Plan     Row Name 09/02/19 1114       Plan    Plan  Home with [facility] (referral pending) and daughters to assist as needed.    Patient/Family in Agreement with Plan  yes    Plan Comments  Met in room with patient & her daughter (Leeanne Denise who lives down the street from patient and assists patient with ADLs as needed, some meals and all transportation to/from appts.  [facility] and [facility]c.  Epic referral made and CCP  to follow-up tomorrow with [facility] to ensure receipt of referral and acceptance.         Destination      No service coordination in this encounter.      Durable Medical Equipment      No service coordination in this encounter.      Dialysis/Infusion      No service coordination in this encounter.      Home Medical Care      No service coordination in this encounter.      Therapy      No service coordination in this encounter.      Community Resources      No service coordination in this encounter.        Expected Discharge Date and Time     Expected Discharge Date Expected Discharge Time    Sep 3-4, 2019         Demographic Summary     Row Name 09/02/19 1112       General Information    Admission Type  inpatient    Arrived From  home    Required Notices Provided  Important Message from Medicare    Referral Source  hospital clinician/department;admission list    Reason for Consult  discharge planning    Preferred Language  English     Used During This Interaction  no       Contact Information    Permission Granted to Share Info With  ;family/designee    Contact Information Obtained for          Functional Status     Row Name 09/02/19 1112       Functional Status    Usual Activity Tolerance  good    Current Activity Tolerance  good       Functional Status, IADL    Medications  independent;assistive person    Meal Preparation  independent;assistive person    Housekeeping  independent;assistive person    Laundry  independent;assistive person    Shopping  independent;assistive person       Mental Status    General Appearance WDL  WDL       Mental Status Summary    Recent Changes in Mental Status/Cognitive Functioning  no changes       Employment/    Employment Status  retired                 Andreea Garcia RN

## 2019-09-02 NOTE — PLAN OF CARE
Problem: Patient Care Overview  Goal: Plan of Care Review  Outcome: Ongoing (interventions implemented as appropriate)   09/02/19 7119   Coping/Psychosocial   Plan of Care Reviewed With patient;family   Plan of Care Review   Progress no change   OTHER   Outcome Summary Patient with no c/o pain or SOA, lungs diminished. IVF infusing and receiving IV antibiotic. VSS. Respiratory panel collected via nasal swab. Regular diet, SLP eval completed. Up with walker and assist x 1 to BSC and chair. Bed/chair alarm in use, family at bedside.      Goal: Individualization and Mutuality  Outcome: Ongoing (interventions implemented as appropriate)    Goal: Discharge Needs Assessment  Outcome: Ongoing (interventions implemented as appropriate)    Goal: Interprofessional Rounds/Family Conf  Outcome: Ongoing (interventions implemented as appropriate)      Problem: Pneumonia (Adult)  Goal: Signs and Symptoms of Listed Potential Problems Will be Absent, Minimized or Managed (Pneumonia)  Outcome: Ongoing (interventions implemented as appropriate)      Problem: Activity Intolerance (Adult)  Goal: Activity Tolerance  Outcome: Ongoing (interventions implemented as appropriate)    Goal: Effective Energy Conservation Techniques  Outcome: Ongoing (interventions implemented as appropriate)      Problem: Fall Risk (Adult)  Goal: Absence of Fall  Outcome: Ongoing (interventions implemented as appropriate)

## 2019-09-02 NOTE — PLAN OF CARE
Problem: Patient Care Overview  Goal: Plan of Care Review   09/02/19 6669   Coping/Psychosocial   Plan of Care Reviewed With patient;family;daughter   Plan of Care Review   Progress no change   OTHER   Outcome Summary Bedside swallow evaluation: Pt alert and cooperative up in chair, daughters present. Pt on regular diet and enjoys daily gipson, eggs and toast per family. Family reports that pt coughs around one time per week on food/drink. Pt/family do not want to alter pt's diet. Pt trialed today with ice, iced tea via straw, puree, diced peaches and cracker. Pt w/ prolonged mastication and eventually spat out bits of peach. Family stated they do see pocketing at times. Family educated about pocketing dangers and strategies. Other consistencies with acceptable oral phase. Timely swallow, good laryngeal elevation. Dry vocal quality with all consistencies, no overt s/s aspiration. Rec. continue regular diet w/ thin liquids per pt request. Family educated about possible need to downgrade diet to increase pt input and reduce pocketing. Family very involved and demosntrated understanding of strategies.

## 2019-09-02 NOTE — DISCHARGE PLACEMENT REQUEST
"Justino Kim (89 y.o. Female)     Date of Birth Social Security Number Address Home Phone MRN    01/19/1922  [id number] [facility] [address] [phone] [id number]    Jainism Marital Status          Jehovah's witness        Admission Date Admission Type Admitting Provider Attending Provider Department, Room/Bed    9/1/19 Emergency Leon Chin MD Ahmed, Aftab, MD [address], P687/1    Discharge Date Discharge Disposition Discharge Destination                       Attending Provider:  Leon Chin MD    Allergies:  Augmentin [Amoxicillin-pot Clavulanate], Codeine, Hydralazine Hcl, Sulfa Antibiotics    Isolation:  None   Infection:  None   Code Status:  Not on file    Ht:  165.1 cm (65\")   Wt:  45.1 kg (99 lb 6.8 oz)    Admission Cmt:  None   Principal Problem:  None                Active Insurance as of 9/1/2019     Primary Coverage     Payor Plan Insurance Group Employer/Plan Group    MEDICARE MEDICARE A & B      Payor Plan Address Payor Plan Phone Number Payor Plan Fax Number Effective Dates    [address] [phone]  1/1/1987 - None Entered    [facility] [id number]       Subscriber Name Subscriber Birth Date Member ID       JUSTINO KIM 1/19/1922 [id number]           Secondary Coverage     Payor Plan Insurance Group Employer/Plan Group    [employer] BLUE CROSS [employer] BLUE CROSS BLUE SHIELD PPO [id number]     Payor Plan Address Payor Plan Phone Number Payor Plan Fax Number Effective Dates    [address] [phone]  11/12/2011 - None Entered    [facility] [id number]       Subscriber Name Subscriber Birth Date Member ID       JUSTINO KIM 1/19/1922 [id number]                 Emergency Contacts      (Rel.) Home Phone Work Phone Mobile Phone    Lucero Moura (Daughter) [phone] -- [phone]    Zulma Mcnair (Daughter) -- -- [phone]    Marisa Taylor (Daughter) [phone] -- [phone]    AndersAyden (Son) -- -- [phone]              "

## 2019-09-02 NOTE — H&P
History and physical    Primary care physician  Dr. Gardner    Chief complaint  Fever chills  Cough  Generalized weakness    History of present illness  89-year-old white female with history of coronary artery disease hypertension hypothyroidism and TIA in the past who lives by herself brought to the emergency room with fever chills cough and generalized weakness.  Patient stated that he started with abdominal pain about a week ago and treated for UTI with Macrobid then developed nausea fatigue cough and fever came yesterday.  Patient work-up in ER revealed right lower lobe pneumonia admit for management.  Patient fully alert oriented daughter at bedside and feeling better and no new complaints.  Patient denies any vomiting but does have nausea and thinks she choke with eating that cause cough.  Patient does not want any feeding tube hemodialysis CPR or intubation but she admits to get the swallow eval and get treatment for possible aspiration pneumonia.    PAST MEDICAL HISTORY  • Blindness     • CAD (coronary artery disease)     • Diverticulitis     • Hypertension     • Stroke (CMS/HCC)        PAST SURGICAL HISTORY  Surgical History         Past Surgical History:   Procedure Laterality Date   • CARDIAC SURGERY       • CHOLECYSTECTOMY       • CORONARY ANGIOPLASTY WITH STENT PLACEMENT       • SHOULDER SURGERY             FAMILY HISTORY  No family history on file.    SOCIAL HISTORY  Social History   Social History            Socioeconomic History   • Marital status:        Spouse name: Not on file   • Number of children: Not on file   • Years of education: Not on file   • Highest education level: Not on file   Tobacco Use   • Smoking status: Never Smoker   Substance and Sexual Activity   • Alcohol use: Yes       Alcohol/week: 0.6 oz       Types: 1 Glasses of wine per week       Comment: holidays   • Drug use: No   • Sexual activity: No         ALLERGIES  Augmentin [amoxicillin-pot clavulanate]; Codeine;  "Hydralazine hcl; and Sulfa antibiotics  Home medications reviewed     REVIEW OF SYSTEMS  Constitutional: Positive for fatigue and fever (tmax 100.5). General malaise +   HENT: Negative for sore throat.    Eyes: Negative.    Respiratory: Negative for cough and shortness of breath.    Cardiovascular: Negative for chest pain.   Gastrointestinal: Positive for nausea. Negative for abdominal pain, diarrhea and vomiting.   Genitourinary: Negative for dysuria.   Musculoskeletal: Negative for neck pain.   Skin: Negative for rash.   Allergic/Immunologic: Negative.    Neurological: Negative for weakness, numbness and headaches.   Hematological: Negative.    Psychiatric/Behavioral: Negative.    All other systems reviewed and are negative.     PHYSICAL EXAM  Blood pressure 135/61, pulse 64, temperature 97.4 °F (36.3 °C), temperature source Oral, resp. rate 18, height 165.1 cm (65\"), weight 45.1 kg (99 lb 6.8 oz), SpO2 97 %, unknown if currently breastfeeding.    Constitutional: She is oriented to person, place, and time. No distress.   Head: Normocephalic and atraumatic.   Mouth/Throat: Mucous membranes are dry.   Eyes: EOM are normal. Pupils are equal, round, and reactive to light.   Neck: Normal range of motion. Neck supple.   Cardiovascular: Normal rate, regular rhythm and normal heart sounds.   Pulmonary/Chest: Effort normal and breath sounds normal. No respiratory distress.   Abdominal: Soft. There is tenderness (LLQ). There is no rebound and no guarding.   Musculoskeletal: Normal range of motion. She exhibits no edema.   Neurological: She is alert and oriented to person, place, and time. She has normal sensation and normal strength.   Skin: Skin is warm and dry. No rash noted.   Psychiatric: Mood and affect normal.     LAB RESULTS  Lab Results (last 24 hours)     Procedure Component Value Units Date/Time    Basic Metabolic Panel [[id number]]  (Abnormal) Collected:  09/02/19 0529    Specimen:  Blood from Arm, Left Updated:  " 09/02/19 0704     Glucose 93 mg/dL      BUN 30 mg/dL      Creatinine 1.03 mg/dL      Sodium 139 mmol/L      Potassium 4.1 mmol/L      Chloride 106 mmol/L      CO2 21.9 mmol/L      Calcium 9.5 mg/dL      eGFR Non African Amer 50 mL/min/1.73      BUN/Creatinine Ratio 29.1     Anion Gap 11.1 mmol/L     Narrative:       GFR Normal >60  Chronic Kidney Disease <60  Kidney Failure <15    CBC (No Diff) [[id number]]  (Abnormal) Collected:  09/02/19 0557    Specimen:  Blood Updated:  09/02/19 0647     WBC 9.73 10*3/mm3      RBC 2.95 10*6/mm3      Hemoglobin 9.6 g/dL      Hematocrit 30.3 %      .7 fL      MCH 32.5 pg      MCHC 31.7 g/dL      RDW 13.9 %      RDW-SD 53.0 fl      MPV 10.8 fL      Platelets 157 10*3/mm3     Blood Culture - Blood, Arm, Left [[id number]] Collected:  09/01/19 1919    Specimen:  Blood from Arm, Left Updated:  09/01/19 1924    Urinalysis With Microscopic If Indicated (No Culture) - Urine, Catheter [[id number]]  (Abnormal) Collected:  09/01/19 1643    Specimen:  Urine, Catheter Updated:  09/01/19 1651     Color, UA Yellow     Appearance, UA Clear     pH, UA <=5.0     Specific Gravity, UA 1.013     Glucose, UA Negative     Ketones, UA 15 mg/dL (1+)     Bilirubin, UA Negative     Blood, UA Negative     Protein, UA Negative     Leuk Esterase, UA Negative     Nitrite, UA Negative     Urobilinogen, UA 0.2 E.U./dL    Narrative:       Urine microscopic not indicated.    Procalcitonin [[id number]]  (Normal) Collected:  09/01/19 1559    Specimen:  Blood Updated:  09/01/19 1641     Procalcitonin 0.12 ng/mL     Narrative:       As a Marker for Sepsis (Non-Neonates):   1. <0.5 ng/mL represents a low risk of severe sepsis and/or septic shock.  1. >2 ng/mL represents a high risk of severe sepsis and/or septic shock.    As a Marker for Lower Respiratory Tract Infections that require antibiotic therapy:  PCT on Admission     Antibiotic Therapy             6-12 Hrs later  > 0.5                Strongly Recommended         "    >0.25 - <0.5         Recommended  0.1 - 0.25           Discouraged                   Remeasure/reassess PCT  <0.1                 Strongly Discouraged          Remeasure/reassess PCT      As 28 day mortality risk marker: \"Change in Procalcitonin Result\" (> 80 % or <=80 %) if Day 0 (or Day 1) and Day 4 values are available. Refer to http://[email or web address]-pct-calculator.com/   Change in PCT <=80 %   A decrease of PCT levels below or equal to 80 % defines a positive change in PCT test result representing a higher risk for 28-day all-cause mortality of patients diagnosed with severe sepsis or septic shock.  Change in PCT > 80 %   A decrease of PCT levels of more than 80 % defines a negative change in PCT result representing a lower risk for 28-day all-cause mortality of patients diagnosed with severe sepsis or septic shock.                Comprehensive Metabolic Panel [[id number]]  (Abnormal) Collected:  09/01/19 1559    Specimen:  Blood Updated:  09/01/19 1634     Glucose 120 mg/dL      BUN 34 mg/dL      Creatinine 1.23 mg/dL      Sodium 140 mmol/L      Potassium 4.7 mmol/L      Chloride 105 mmol/L      CO2 21.5 mmol/L      Calcium 9.7 mg/dL      Total Protein 6.3 g/dL      Albumin 3.50 g/dL      ALT (SGPT) 8 U/L      AST (SGOT) 18 U/L      Alkaline Phosphatase 77 U/L      Total Bilirubin 0.3 mg/dL      eGFR Non African Amer 40 mL/min/1.73      Globulin 2.8 gm/dL      A/G Ratio 1.3 g/dL      BUN/Creatinine Ratio 27.6     Anion Gap 13.5 mmol/L     Narrative:       GFR Normal >60  Chronic Kidney Disease <60  Kidney Failure <15    Lactic Acid, Plasma [[id number]]  (Normal) Collected:  09/01/19 1559    Specimen:  Blood Updated:  09/01/19 1624     Lactate 1.2 mmol/L     CBC & Differential [[id number]] Collected:  09/01/19 1559    Specimen:  Blood Updated:  09/01/19 1614    Narrative:       The following orders were created for panel order CBC & Differential.  Procedure                               Abnormality         Status      "                ---------                               -----------         ------                     CBC Auto Differential[[id number]]         Abnormal            Final result                 Please view results for these tests on the individual orders.    CBC Auto Differential [[id number]]  (Abnormal) Collected:  09/01/19 1559    Specimen:  Blood Updated:  09/01/19 1614     WBC 15.17 10*3/mm3      RBC 3.35 10*6/mm3      Hemoglobin 11.0 g/dL      Hematocrit 34.4 %      .7 fL      MCH 32.8 pg      MCHC 32.0 g/dL      RDW 14.1 %      RDW-SD 53.7 fl      MPV 10.9 fL      Platelets 173 10*3/mm3      Neutrophil % 76.2 %      Lymphocyte % 15.8 %      Monocyte % 6.9 %      Eosinophil % 0.3 %      Basophil % 0.3 %      Immature Grans % 0.5 %      Neutrophils, Absolute 11.57 10*3/mm3      Lymphocytes, Absolute 2.39 10*3/mm3      Monocytes, Absolute 1.05 10*3/mm3      Eosinophils, Absolute 0.04 10*3/mm3      Basophils, Absolute 0.05 10*3/mm3      Immature Grans, Absolute 0.07 10*3/mm3      nRBC 0.0 /100 WBC     Blood Culture - Blood, Arm, Left [[id number]] Collected:  09/01/19 1559    Specimen:  Blood from Arm, Left Updated:  09/01/19 1605        Imaging Results (last 24 hours)     Procedure Component Value Units Date/Time    CT Abdomen Pelvis Without Contrast [[id number]] Collected:  09/01/19 1846     Updated:  09/01/19 1846    Narrative:       CT ABDOMEN AND PELVIS WITHOUT IV CONTRAST     HISTORY: Abdominal pain and fever.     TECHNIQUE: Radiation dose reduction techniques were utilized, including  automated exposure control and exposure modulation based on body size.   3 mm images were obtained through the abdomen and pelvis without IV  contrast.      COMPARISON: CT abdomen and pelvis 04/26/2017.     FINDINGS: Evaluation is suboptimal without intravenous contrast.     Irregular pulmonary opacification and tree-in-bud nodularity within the  right middle lobe and lingula is grossly unchanged since 04/26/2017.  There is  pulmonary opacification with associated endobronchial filling  defects within the right lower lobe which is new. A 0.8 cm pulmonary  nodule within the right lower lobe is grossly unchanged since  04/26/2017. Large hiatal hernia is present.     Small pericardial effusion has mildly increased in size since  04/26/2017.     There are no findings of small bowel obstruction.     The appendix is unremarkable.     Severe intrahepatic and extrahepatic biliary ductal dilation is present  status post cholecystectomy, as before.     The liver, pancreas, spleen and adrenal glands have an unremarkable  noncontrast CT appearance. Simple cysts are present within the bilateral  kidneys, as before. There is asymmetric atrophy of the inferior aspect  of the left kidney. Bilateral parapelvic cysts are also present. There  is no hydronephrosis.     There is no abdominopelvic adenopathy by size criteria. 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 There is moderate to severe atherosclerotic  calcification of the abdominal aorta and its major branches.     Colonic diverticulosis is present. There is no free intraperitoneal air  or fluid. There is diffuse anasarca with subtle fat stranding throughout  the subcutaneous tissues as well as the mesentery.     No suspicious lytic, blastic or osseous lesions are present.     Mild fat stranding surrounds the distal descending/proximal sigmoid  colon.     The bladder is moderately distended but otherwise unremarkable. A  vaginal ring is present.       Impression:       1.  Findings of aspiration pneumonia in the right lower lobe in the  appropriate clinical context and correlation with patient history is  recommended.  2.  Irregular pulmonary opacification and tree-in-bud nodularity within  the right middle lobe and lingula, as before.  3.  Diffuse anasarca with subtle fat stranding throughout the mesentery  and subcutaneous tissues as  above. No definite findings of  diverticulitis.  4.  Other findings as above.     The above findings were discussed with Dr. Urbina by telephone by  Shahab Michaels at 6:30 PM on 09/01/2019    .             XR Chest 1 View [[id number]] Collected:  09/01/19 1614     Updated:  09/01/19 1619    Narrative:       XR CHEST 1 VW-     HISTORY: Female who is 89 years-old, fever     TECHNIQUE: Frontal views of the chest     COMPARISON: 09/20/2015     FINDINGS: The patient is rotated towards the right. Heart size is  borderline. Aorta is tortuous, calcified. Pulmonary vasculature is  unremarkable. Slight chronic interstitial prominence. Slight blunting of  costophrenic angles could reflect atelectasis or minimal effusion. No  pneumothorax. No acute osseous process.       Impression:       No focal pulmonary consolidation. Slight blunting of the  costophrenic angles. Borderline heart size. Tortuous aorta. Follow-up as  clinically indicated.     This report was finalized on 9/1/2019 4:16 PM by Dr. Tavon Moser M.D.               Current Facility-Administered Medications:   •  amLODIPine (NORVASC) tablet 10 mg, 10 mg, Oral, Q PM, Leon Chin MD, 10 mg at 09/01/19 1208  •  aspirin chewable tablet 81 mg, 81 mg, Oral, Daily, Leon Chin MD  •  cetirizine (zyrTEC) tablet 5 mg, 5 mg, Oral, Daily, Leon Chin MD  •  cholecalciferol (VITAMIN D3) tablet 1,000 Units, 1,000 Units, Oral, Q PM, Leon Chin MD, 1,000 Units at 09/01/19 0578  •  clopidogrel (PLAVIX) tablet 75 mg, 75 mg, Oral, Daily, Leon Chin MD  •  ferrous sulfate tablet 325 mg, 325 mg, Oral, BID, Leon Chin MD  •  guaiFENesin (MUCINEX) 12 hr tablet 600 mg, 600 mg, Oral, Q PM, Leon Chin MD  •  ipratropium-albuterol (DUO-NEB) nebulizer solution 3 mL, 3 mL, Nebulization, Q4H PRN, Leon Chin MD  •  isosorbide mononitrate (IMDUR) 24 hr tablet 60 mg, 60 mg, Oral, Daily, Leon Chin MD  •  lactobacillus acidophilus (RISAQUAD) capsule 1 capsule, 1  capsule, Oral, Daily, Joshua Chin MD  •  [START ON 9/3/2019] levoFLOXacin (LEVAQUIN) 750 mg/150 mL D5W (premix) (LEVAQUIN) 750 mg, 750 mg, Intravenous, Q48H, Joshua Chin MD  •  levothyroxine (SYNTHROID, LEVOTHROID) tablet 75 mcg, 75 mcg, Oral, Daily, Joshua Chin MD  •  losartan (COZAAR) tablet 25 mg, 25 mg, Oral, Q24H, Joshua Chin MD  •  magnesium oxide (MAG-OX) tablet 400 mg, 400 mg, Oral, Daily, Joshua Chin MD  •  melatonin tablet 5 mg, 5 mg, Oral, Nightly PRN, Joshua Chin MD, 5 mg at 09/01/19 2359  •  melatonin tablet 5 mg, 5 mg, Oral, Nightly, Joshua Chin MD  •  metoprolol tartrate (LOPRESSOR) tablet 25 mg, 25 mg, Oral, Q12H, Joshua Chin MD, 25 mg at 09/02/19 0824  •  montelukast (SINGULAIR) tablet 10 mg, 10 mg, Oral, Nightly, Joshua Chin MD, 10 mg at 09/01/19 2358  •  multivitamin (THERAGRAN) tablet 1 tablet, 1 tablet, Oral, Daily, Joshua Chin MD  •  pantoprazole (PROTONIX) EC tablet 40 mg, 40 mg, Oral, Q AM, Joshua Chin MD, 40 mg at 09/02/19 0640  •  [COMPLETED] Insert peripheral IV, , , Once **AND** sodium chloride 0.9 % flush 10 mL, 10 mL, Intravenous, PRN, Arnulfo Urbina MD  •  sodium chloride 0.9 % infusion, 75 mL/hr, Intravenous, Continuous, Joshua Chin MD, Last Rate: 75 mL/hr at 09/02/19 1200, 75 mL/hr at 09/02/19 1200     ASSESSMENT  Right lower lobe pneumonia rule out aspiration  Dehydration  Hypertension  Hypothyroidism  Coronary disease  Chronic anemia  Gastroesophageal reflux disease    PLAN  Admit  IV fluid  IV antibiotics  Swallow eval  Continue home medication and adjust the doses  Stress ulcer DVT prophylaxis  Supportive care  Discussed with family  DNR and no feeding tube  PT OT  Follow closely further recommendation according to hospital course    JOSHUA CHIN MD

## 2019-09-02 NOTE — PLAN OF CARE
Problem: Patient Care Overview  Goal: Plan of Care Review  Outcome: Ongoing (interventions implemented as appropriate)   09/02/19 0321   Coping/Psychosocial   Plan of Care Reviewed With patient   Plan of Care Review   Progress no change   OTHER   Outcome Summary admitted from ER with c/o wekness, on and off low grade fever, admitted for RLL PNA, plan of care initiated, IV antibiotics, IVF started, on falls precaution, bed alarms on, some ronchi RLL and RML, no cough noted, no SOA noted, for further care     Goal: Individualization and Mutuality  Outcome: Ongoing (interventions implemented as appropriate)    Goal: Discharge Needs Assessment  Outcome: Ongoing (interventions implemented as appropriate)      Problem: Pneumonia (Adult)  Goal: Signs and Symptoms of Listed Potential Problems Will be Absent, Minimized or Managed (Pneumonia)  Outcome: Ongoing (interventions implemented as appropriate)      Problem: Activity Intolerance (Adult)  Goal: Identify Related Risk Factors and Signs and Symptoms  Outcome: Outcome(s) achieved Date Met: 09/02/19    Goal: Activity Tolerance  Outcome: Ongoing (interventions implemented as appropriate)    Goal: Effective Energy Conservation Techniques  Outcome: Ongoing (interventions implemented as appropriate)      Problem: Fall Risk (Adult)  Goal: Identify Related Risk Factors and Signs and Symptoms  Outcome: Outcome(s) achieved Date Met: 09/02/19    Goal: Absence of Fall  Outcome: Ongoing (interventions implemented as appropriate)

## 2019-09-03 LAB
ALBUMIN SERPL-MCNC: 3 G/DL (ref 3.5–5.2)
ALBUMIN/GLOB SERPL: 1.3 G/DL
ALP SERPL-CCNC: 64 U/L (ref 39–117)
ALT SERPL W P-5'-P-CCNC: 6 U/L (ref 1–33)
ANION GAP SERPL CALCULATED.3IONS-SCNC: 6.3 MMOL/L (ref 5–15)
AST SERPL-CCNC: 17 U/L (ref 1–32)
BASOPHILS # BLD AUTO: 0.05 10*3/MM3 (ref 0–0.2)
BASOPHILS NFR BLD AUTO: 0.6 % (ref 0–1.5)
BILIRUB SERPL-MCNC: 0.2 MG/DL (ref 0.2–1.2)
BUN BLD-MCNC: 20 MG/DL (ref 8–23)
BUN/CREAT SERPL: 23.3 (ref 7–25)
CALCIUM SPEC-SCNC: 9.3 MG/DL (ref 8.2–9.6)
CHLORIDE SERPL-SCNC: 105 MMOL/L (ref 98–107)
CHOLEST SERPL-MCNC: 88 MG/DL (ref 0–200)
CO2 SERPL-SCNC: 21.7 MMOL/L (ref 22–29)
CREAT BLD-MCNC: 0.86 MG/DL (ref 0.57–1)
DEPRECATED RDW RBC AUTO: 52.7 FL (ref 37–54)
EOSINOPHIL # BLD AUTO: 0.28 10*3/MM3 (ref 0–0.4)
EOSINOPHIL NFR BLD AUTO: 3.1 % (ref 0.3–6.2)
ERYTHROCYTE [DISTWIDTH] IN BLOOD BY AUTOMATED COUNT: 14 % (ref 12.3–15.4)
GFR SERPL CREATININE-BSD FRML MDRD: 61 ML/MIN/1.73
GLOBULIN UR ELPH-MCNC: 2.3 GM/DL
GLUCOSE BLD-MCNC: 105 MG/DL (ref 65–99)
HBA1C MFR BLD: 5.1 % (ref 4.8–5.6)
HCT VFR BLD AUTO: 30 % (ref 34–46.6)
HDLC SERPL-MCNC: 44 MG/DL (ref 40–60)
HGB BLD-MCNC: 9.5 G/DL (ref 12–15.9)
IMM GRANULOCYTES # BLD AUTO: 0.06 10*3/MM3 (ref 0–0.05)
IMM GRANULOCYTES NFR BLD AUTO: 0.7 % (ref 0–0.5)
LDLC SERPL CALC-MCNC: 33 MG/DL (ref 0–100)
LDLC/HDLC SERPL: 0.75 {RATIO}
LYMPHOCYTES # BLD AUTO: 1.66 10*3/MM3 (ref 0.7–3.1)
LYMPHOCYTES NFR BLD AUTO: 18.6 % (ref 19.6–45.3)
MCH RBC QN AUTO: 32.3 PG (ref 26.6–33)
MCHC RBC AUTO-ENTMCNC: 31.7 G/DL (ref 31.5–35.7)
MCV RBC AUTO: 102 FL (ref 79–97)
MONOCYTES # BLD AUTO: 0.83 10*3/MM3 (ref 0.1–0.9)
MONOCYTES NFR BLD AUTO: 9.3 % (ref 5–12)
NEUTROPHILS # BLD AUTO: 6.03 10*3/MM3 (ref 1.7–7)
NEUTROPHILS NFR BLD AUTO: 67.7 % (ref 42.7–76)
NRBC BLD AUTO-RTO: 0 /100 WBC (ref 0–0.2)
NT-PROBNP SERPL-MCNC: 2865 PG/ML (ref 5–1800)
PLATELET # BLD AUTO: 171 10*3/MM3 (ref 140–450)
PMV BLD AUTO: 10.7 FL (ref 6–12)
POTASSIUM BLD-SCNC: 3.7 MMOL/L (ref 3.5–5.2)
PROT SERPL-MCNC: 5.3 G/DL (ref 6–8.5)
RBC # BLD AUTO: 2.94 10*6/MM3 (ref 3.77–5.28)
SODIUM BLD-SCNC: 133 MMOL/L (ref 136–145)
TRIGL SERPL-MCNC: 55 MG/DL (ref 0–150)
TSH SERPL DL<=0.05 MIU/L-ACNC: 2.59 UIU/ML (ref 0.27–4.2)
VLDLC SERPL-MCNC: 11 MG/DL (ref 5–40)
WBC NRBC COR # BLD: 8.91 10*3/MM3 (ref 3.4–10.8)

## 2019-09-03 PROCEDURE — 97165 OT EVAL LOW COMPLEX 30 MIN: CPT

## 2019-09-03 PROCEDURE — 83036 HEMOGLOBIN GLYCOSYLATED A1C: CPT | Performed by: HOSPITALIST

## 2019-09-03 PROCEDURE — 83880 ASSAY OF NATRIURETIC PEPTIDE: CPT | Performed by: HOSPITALIST

## 2019-09-03 PROCEDURE — 80053 COMPREHEN METABOLIC PANEL: CPT | Performed by: HOSPITALIST

## 2019-09-03 PROCEDURE — 84443 ASSAY THYROID STIM HORMONE: CPT | Performed by: HOSPITALIST

## 2019-09-03 PROCEDURE — 97162 PT EVAL MOD COMPLEX 30 MIN: CPT

## 2019-09-03 PROCEDURE — 97110 THERAPEUTIC EXERCISES: CPT

## 2019-09-03 PROCEDURE — 94799 UNLISTED PULMONARY SVC/PX: CPT

## 2019-09-03 PROCEDURE — 25010000002 LEVOFLOXACIN PER 250 MG: Performed by: HOSPITALIST

## 2019-09-03 PROCEDURE — 80061 LIPID PANEL: CPT | Performed by: HOSPITALIST

## 2019-09-03 PROCEDURE — 97535 SELF CARE MNGMENT TRAINING: CPT

## 2019-09-03 PROCEDURE — 85025 COMPLETE CBC W/AUTO DIFF WBC: CPT | Performed by: HOSPITALIST

## 2019-09-03 RX ORDER — LOSARTAN POTASSIUM 50 MG/1
50 TABLET ORAL
Status: DISCONTINUED | OUTPATIENT
Start: 2019-09-04 | End: 2019-09-06 | Stop reason: HOSPADM

## 2019-09-03 RX ADMIN — LEVOTHYROXINE SODIUM 75 MCG: 75 TABLET ORAL at 06:15

## 2019-09-03 RX ADMIN — LOSARTAN POTASSIUM 25 MG: 25 TABLET, FILM COATED ORAL at 08:49

## 2019-09-03 RX ADMIN — MONTELUKAST SODIUM 10 MG: 10 TABLET, FILM COATED ORAL at 20:26

## 2019-09-03 RX ADMIN — ISOSORBIDE MONONITRATE 30 MG: 30 TABLET ORAL at 08:49

## 2019-09-03 RX ADMIN — FERROUS SULFATE TAB 325 MG (65 MG ELEMENTAL FE) 325 MG: 325 (65 FE) TAB at 20:34

## 2019-09-03 RX ADMIN — ASPIRIN 81 MG: 81 TABLET, CHEWABLE ORAL at 08:49

## 2019-09-03 RX ADMIN — METOPROLOL TARTRATE 12.5 MG: 25 TABLET ORAL at 20:26

## 2019-09-03 RX ADMIN — IPRATROPIUM BROMIDE AND ALBUTEROL SULFATE 3 ML: 2.5; .5 SOLUTION RESPIRATORY (INHALATION) at 23:05

## 2019-09-03 RX ADMIN — GUAIFENESIN 600 MG: 600 TABLET, EXTENDED RELEASE ORAL at 17:30

## 2019-09-03 RX ADMIN — Medication 400 MG: at 08:49

## 2019-09-03 RX ADMIN — FERROUS SULFATE TAB 325 MG (65 MG ELEMENTAL FE) 325 MG: 325 (65 FE) TAB at 08:49

## 2019-09-03 RX ADMIN — AMLODIPINE BESYLATE 10 MG: 10 TABLET ORAL at 17:30

## 2019-09-03 RX ADMIN — METOPROLOL TARTRATE 25 MG: 25 TABLET ORAL at 08:49

## 2019-09-03 RX ADMIN — CLOPIDOGREL 75 MG: 75 TABLET, FILM COATED ORAL at 08:49

## 2019-09-03 RX ADMIN — VITAMIN D, TAB 1000IU (100/BT) 1000 UNITS: 25 TAB at 17:30

## 2019-09-03 RX ADMIN — SODIUM CHLORIDE 75 ML/HR: 9 INJECTION, SOLUTION INTRAVENOUS at 01:23

## 2019-09-03 RX ADMIN — LEVOFLOXACIN 750 MG: 5 INJECTION, SOLUTION INTRAVENOUS at 20:26

## 2019-09-03 RX ADMIN — IPRATROPIUM BROMIDE AND ALBUTEROL SULFATE 3 ML: 2.5; .5 SOLUTION RESPIRATORY (INHALATION) at 09:36

## 2019-09-03 RX ADMIN — IPRATROPIUM BROMIDE AND ALBUTEROL SULFATE 3 ML: 2.5; .5 SOLUTION RESPIRATORY (INHALATION) at 12:52

## 2019-09-03 RX ADMIN — PANTOPRAZOLE SODIUM 40 MG: 40 TABLET, DELAYED RELEASE ORAL at 06:15

## 2019-09-03 RX ADMIN — Medication 1 CAPSULE: at 08:49

## 2019-09-03 NOTE — PROGRESS NOTES
"Daily progress note    Chief complaint  Doing better  No new complaints  Family at bedside    History of present illness  89-year-old white female with history of coronary artery disease hypertension hypothyroidism and TIA in the past who lives by herself brought to the emergency room with fever chills cough and generalized weakness.  Patient stated that he started with abdominal pain about a week ago and treated for UTI with Macrobid then developed nausea fatigue cough and fever came yesterday.  Patient work-up in ER revealed right lower lobe pneumonia admit for management.  Patient fully alert oriented daughter at bedside and feeling better and no new complaints.  Patient denies any vomiting but does have nausea and thinks she choke with eating that cause cough.  Patient does not want any feeding tube hemodialysis CPR or intubation but she admits to get the swallow eval and get treatment for possible aspiration pneumonia.     REVIEW OF SYSTEMS  Constitutional: Positive for fatigue and fever (tmax 100.5). General malaise +   HENT: Negative for sore throat.    Eyes: Negative.    Respiratory: Negative for cough and shortness of breath.    Cardiovascular: Negative for chest pain.   Gastrointestinal: Positive for nausea. Negative for abdominal pain, diarrhea and vomiting.   Genitourinary: Negative for dysuria.   Musculoskeletal: Negative for neck pain.   Skin: Negative for rash.   Allergic/Immunologic: Negative.    Neurological: Negative for weakness, numbness and headaches.   Hematological: Negative.    Psychiatric/Behavioral: Negative.    All other systems reviewed and are negative.     PHYSICAL EXAM  Blood pressure 150/68, pulse 64, temperature 97.2 °F (36.2 °C), temperature source Oral, resp. rate 20, height 165.1 cm (65\"), weight 45.1 kg (99 lb 6.8 oz), SpO2 97 %, unknown if currently breastfeeding.    Constitutional: She is oriented to person, place, and time. No distress.   Head: Normocephalic and atraumatic. "   Mouth/Throat: Mucous membranes are dry.   Eyes: EOM are normal. Pupils are equal, round, and reactive to light.   Neck: Normal range of motion. Neck supple.   Cardiovascular: Normal rate, regular rhythm and normal heart sounds.   Pulmonary/Chest: Effort normal and breath sounds normal. No respiratory distress.   Abdominal: Soft. There is tenderness (LLQ). There is no rebound and no guarding.   Musculoskeletal: Normal range of motion. She exhibits no edema.   Neurological: She is alert and oriented to person, place, and time. She has normal sensation and normal strength.   Skin: Skin is warm and dry. No rash noted.   Psychiatric: Mood and affect normal.     LAB RESULTS  Lab Results (last 24 hours)     Procedure Component Value Units Date/Time    BNP [[id number]]  (Abnormal) Collected:  09/03/19 0515    Specimen:  Blood Updated:  09/03/19 0608     proBNP 2,865.0 pg/mL     Narrative:       Among patients with dyspnea, NT-proBNP is highly sensitive for the detection of acute congestive heart failure. In addition NT-proBNP of <300 pg/ml effectively rules out acute congestive heart failure with 99% negative predictive value.    TSH [[id number]]  (Normal) Collected:  09/03/19 0515    Specimen:  Blood Updated:  09/03/19 0608     TSH 2.590 uIU/mL     Hemoglobin A1c [[id number]]  (Normal) Collected:  09/03/19 0515    Specimen:  Blood Updated:  09/03/19 0559     Hemoglobin A1C 5.10 %     Narrative:       Hemoglobin A1C Ranges:    Increased Risk for Diabetes  5.7% to 6.4%  Diabetes                     >= 6.5%  Diabetic Goal                < 7.0%    Comprehensive Metabolic Panel [[id number]]  (Abnormal) Collected:  09/03/19 0515    Specimen:  Blood Updated:  09/03/19 0558     Glucose 105 mg/dL      BUN 20 mg/dL      Creatinine 0.86 mg/dL      Sodium 133 mmol/L      Potassium 3.7 mmol/L      Chloride 105 mmol/L      CO2 21.7 mmol/L      Calcium 9.3 mg/dL      Total Protein 5.3 g/dL      Albumin 3.00 g/dL      ALT (SGPT) 6 U/L       AST (SGOT) 17 U/L      Alkaline Phosphatase 64 U/L      Total Bilirubin 0.2 mg/dL      eGFR Non African Amer 61 mL/min/1.73      Globulin 2.3 gm/dL      A/G Ratio 1.3 g/dL      BUN/Creatinine Ratio 23.3     Anion Gap 6.3 mmol/L     Narrative:       GFR Normal >60  Chronic Kidney Disease <60  Kidney Failure <15    Lipid Panel [[id number]] Collected:  09/03/19 0515    Specimen:  Blood Updated:  09/03/19 0558     Total Cholesterol 88 mg/dL      Triglycerides 55 mg/dL      HDL Cholesterol 44 mg/dL      LDL Cholesterol  33 mg/dL      VLDL Cholesterol 11 mg/dL      LDL/HDL Ratio 0.75    Narrative:       Cholesterol Reference Ranges  (U.S. Department of Health and Human Services ATP III Classifications)    Desirable          <200 mg/dL  Borderline High    200-239 mg/dL  High Risk          >240 mg/dL      Triglyceride Reference Ranges  (U.S. Department of Health and Human Services ATP III Classifications)    Normal           <150 mg/dL  Borderline High  150-199 mg/dL  High             200-499 mg/dL  Very High        >500 mg/dL    HDL Reference Ranges  (U.S. Department of Health and Human Services ATP III Classifcations)    Low     <40 mg/dl (major risk factor for CHD)  High    >60 mg/dl ('negative' risk factor for CHD)        LDL Reference Ranges  (U.S. Department of Health and Human Services ATP III Classifcations)    Optimal          <100 mg/dL  Near Optimal     100-129 mg/dL  Borderline High  130-159 mg/dL  High             160-189 mg/dL  Very High        >189 mg/dL    CBC & Differential [[id number]] Collected:  09/03/19 0515    Specimen:  Blood Updated:  09/03/19 0546    Narrative:       The following orders were created for panel order CBC & Differential.  Procedure                               Abnormality         Status                     ---------                               -----------         ------                     CBC Auto Differential[[id number]]        Abnormal            Final result                 Please  view results for these tests on the individual orders.    CBC Auto Differential [[id number]]  (Abnormal) Collected:  09/03/19 0515    Specimen:  Blood Updated:  09/03/19 0546     WBC 8.91 10*3/mm3      RBC 2.94 10*6/mm3      Hemoglobin 9.5 g/dL      Hematocrit 30.0 %      .0 fL      MCH 32.3 pg      MCHC 31.7 g/dL      RDW 14.0 %      RDW-SD 52.7 fl      MPV 10.7 fL      Platelets 171 10*3/mm3      Neutrophil % 67.7 %      Lymphocyte % 18.6 %      Monocyte % 9.3 %      Eosinophil % 3.1 %      Basophil % 0.6 %      Immature Grans % 0.7 %      Neutrophils, Absolute 6.03 10*3/mm3      Lymphocytes, Absolute 1.66 10*3/mm3      Monocytes, Absolute 0.83 10*3/mm3      Eosinophils, Absolute 0.28 10*3/mm3      Basophils, Absolute 0.05 10*3/mm3      Immature Grans, Absolute 0.06 10*3/mm3      nRBC 0.0 /100 WBC     Blood Culture - Blood, Arm, Left [[id number]] Collected:  09/01/19 1919    Specimen:  Blood from Arm, Left Updated:  09/02/19 1930     Blood Culture No growth at 24 hours    Respiratory Panel, PCR - Swab, Nasopharynx [[id number]]  (Normal) Collected:  09/02/19 1415    Specimen:  Swab from Nasopharynx Updated:  09/02/19 1733     ADENOVIRUS, PCR Not Detected     Coronavirus 229E Not Detected     Coronavirus HKU1 Not Detected     Coronavirus NL63 Not Detected     Coronavirus OC43 Not Detected     Human Metapneumovirus Not Detected     Human Rhinovirus/Enterovirus Not Detected     Influenza B PCR Not Detected     Parainfluenza Virus 1 Not Detected     Parainfluenza Virus 2 Not Detected     Parainfluenza Virus 3 Not Detected     Parainfluenza Virus 4 Not Detected     Bordetella pertussis pcr Not Detected     Influenza A H1 2009 PCR Not Detected     Chlamydophila pneumoniae PCR Not Detected     Mycoplasma pneumo by PCR Not Detected     Influenza A PCR Not Detected     Influenza A H3 Not Detected     Influenza A H1 Not Detected     RSV, PCR Not Detected     Bordetella parapertussis PCR Not Detected    Blood Culture -  Blood, Arm, Left [[id number]] Collected:  09/01/19 1559    Specimen:  Blood from Arm, Left Updated:  09/02/19 1615     Blood Culture No growth at 24 hours        Imaging Results (last 24 hours)     Procedure Component Value Units Date/Time    CT Abdomen Pelvis Without Contrast [[id number]] Collected:  09/01/19 1846     Updated:  09/03/19 0742    Narrative:       CT ABDOMEN AND PELVIS WITHOUT IV CONTRAST     HISTORY: Abdominal pain and fever.     TECHNIQUE: Radiation dose reduction techniques were utilized, including  automated exposure control and exposure modulation based on body size.   3 mm images were obtained through the abdomen and pelvis without IV  contrast.      COMPARISON: CT abdomen and pelvis 04/26/2017.     FINDINGS: Evaluation is suboptimal without intravenous contrast.     Irregular pulmonary opacification and tree-in-bud nodularity within the  right middle lobe and lingula is grossly unchanged since 04/26/2017.  There is pulmonary opacification with associated endobronchial filling  defects within the right lower lobe which is new. A 0.8 cm pulmonary  nodule within the right lower lobe is grossly unchanged since  04/26/2017. Large hiatal hernia is present.     Small pericardial effusion has mildly increased in size since  04/26/2017.     There are no findings of small bowel obstruction.     The appendix is unremarkable.     Severe intrahepatic and extrahepatic biliary ductal dilation is present  status post cholecystectomy, as before.     The liver, pancreas, spleen and adrenal glands have an unremarkable  noncontrast CT appearance. Simple cysts are present within the bilateral  kidneys, as before. There is asymmetric atrophy of the inferior aspect  of the left kidney. Bilateral parapelvic cysts are also present. There  is no hydronephrosis.     There is no abdominopelvic adenopathy by size criteria. 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  There is moderate to severe atherosclerotic  calcification of the abdominal aorta and its major branches.     Colonic diverticulosis is present. There is no free intraperitoneal air  or fluid. There is diffuse anasarca with subtle fat stranding throughout  the subcutaneous tissues as well as the mesentery.     No suspicious lytic, blastic or osseous lesions are present.     The bladder is moderately distended but otherwise unremarkable. A  vaginal ring is present.       Impression:       1.  Findings of aspiration pneumonia in the right lower lobe in the  appropriate clinical context and correlation with patient history is  recommended.  2.  Irregular pulmonary opacification and tree-in-bud nodularity within  the right middle lobe and lingula, as before.  3.  Diffuse anasarca with subtle fat stranding throughout the mesentery  and subcutaneous tissues as above. No definite findings of  diverticulitis.  4.  Other findings as above.     The above findings were discussed with Dr. Urbina by telephone by  Shahab Michaels at 6:30 PM on 09/01/2019    .     This report was finalized on 9/3/2019 7:39 AM by Dr. Shahab Michaels M.D.               Current Facility-Administered Medications:   •  amLODIPine (NORVASC) tablet 10 mg, 10 mg, Oral, Q PM, Leon Chin MD, 10 mg at 09/02/19 1812  •  aspirin chewable tablet 81 mg, 81 mg, Oral, Daily, Leon Chin MD, 81 mg at 09/03/19 0849  •  cholecalciferol (VITAMIN D3) tablet 1,000 Units, 1,000 Units, Oral, Q PM, Leon Chin MD, 1,000 Units at 09/02/19 1812  •  clopidogrel (PLAVIX) tablet 75 mg, 75 mg, Oral, Daily, Leon Chin MD, 75 mg at 09/03/19 0849  •  ferrous sulfate tablet 325 mg, 325 mg, Oral, BID, Leon Chin MD, 325 mg at 09/03/19 0849  •  guaiFENesin (MUCINEX) 12 hr tablet 600 mg, 600 mg, Oral, Q PM, Leon Chin MD, 600 mg at 09/02/19 1812  •  ipratropium-albuterol (DUO-NEB) nebulizer solution 3 mL, 3 mL, Nebulization, Q4H PRN, Leon Chin MD  •   ipratropium-albuterol (DUO-NEB) nebulizer solution 3 mL, 3 mL, Nebulization, Q4H - RT, Leon Chin MD, 3 mL at 09/03/19 1252  •  isosorbide mononitrate (IMDUR) 24 hr tablet 30 mg, 30 mg, Oral, Daily, Leon Chin MD, 30 mg at 09/03/19 0849  •  lactobacillus acidophilus (RISAQUAD) capsule 1 capsule, 1 capsule, Oral, Daily, Leon Chin MD, 1 capsule at 09/03/19 0849  •  levoFLOXacin (LEVAQUIN) 750 mg/150 mL D5W (premix) (LEVAQUIN) 750 mg, 750 mg, Intravenous, Q48H, Leon Chin MD  •  levothyroxine (SYNTHROID, LEVOTHROID) tablet 75 mcg, 75 mcg, Oral, Q AM, Leon Chin MD, 75 mcg at 09/03/19 0615  •  losartan (COZAAR) tablet 25 mg, 25 mg, Oral, Q24H, Leon Chin MD, 25 mg at 09/03/19 0849  •  magnesium oxide (MAG-OX) tablet 400 mg, 400 mg, Oral, Daily, Leon Chin MD, 400 mg at 09/03/19 0849  •  melatonin tablet 5 mg, 5 mg, Oral, Nightly PRN, Leon Chin MD  •  metoprolol tartrate (LOPRESSOR) tablet 25 mg, 25 mg, Oral, Q12H, Leon Chin MD, 25 mg at 09/03/19 0849  •  montelukast (SINGULAIR) tablet 10 mg, 10 mg, Oral, Nightly, Leon Chin MD, 10 mg at 09/02/19 2020  •  pantoprazole (PROTONIX) EC tablet 40 mg, 40 mg, Oral, Q AM, Leon Chin MD, 40 mg at 09/03/19 0615  •  [COMPLETED] Insert peripheral IV, , , Once **AND** sodium chloride 0.9 % flush 10 mL, 10 mL, Intravenous, PRN, Arnulfo Urbina MD  •  sodium chloride 0.9 % infusion, 75 mL/hr, Intravenous, Continuous, Leon Chin MD, Last Rate: 75 mL/hr at 09/03/19 0123, 75 mL/hr at 09/03/19 0123     ASSESSMENT  Right lower lobe pneumonia   Dehydration  Hypertension  Hypothyroidism  Coronary disease  Chronic anemia  Gastroesophageal reflux disease    PLAN  CPM  Discontinue IV fluid  IV antibiotics  Swallow eval noted and appreciated  Continue home medication and adjust the doses  Stress ulcer DVT prophylaxis  Supportive care  Discussed with family  DNR and no feeding tube  PT OT  Follow closely further recommendation according to hospital  hilda MELENDEZ MD

## 2019-09-03 NOTE — PROGRESS NOTES
Continued Stay Note  [facility]     Patient Name: Dinah Mcnamara  MRN: [id number]  Today's Date: 9/3/2019    Admit Date: 9/1/2019    Discharge Plan     Row Name 09/03/19 1227       Plan    Plan  Home w/ [facility] and daughters to assist.      Plan Comments  Patient accepted with [facility] per Sherry.      Row Name 09/03/19 1210       Plan    Plan Comments  Placed call to Sherry with [facility] @ [phone] to follow-up on referral.  Left  requesting update.                      Andreea Garcia RN

## 2019-09-03 NOTE — PLAN OF CARE
Problem: Patient Care Overview  Goal: Plan of Care Review  Outcome: Ongoing (interventions implemented as appropriate)      Problem: Pneumonia (Adult)  Goal: Signs and Symptoms of Listed Potential Problems Will be Absent, Minimized or Managed (Pneumonia)  Outcome: Ongoing (interventions implemented as appropriate)      Problem: Activity Intolerance (Adult)  Goal: Activity Tolerance  Outcome: Ongoing (interventions implemented as appropriate)      Problem: Fall Risk (Adult)  Goal: Absence of Fall  Outcome: Ongoing (interventions implemented as appropriate)

## 2019-09-03 NOTE — PLAN OF CARE
Problem: Patient Care Overview  Goal: Plan of Care Review   09/03/19 1533   Coping/Psychosocial   Plan of Care Reviewed With patient   OTHER   Outcome Summary Pt is pleasantly confused 89 year old female with dx of right lower lobe pneumonia. Pt states she was independent and living home alone prior to hospitalization. Upon evaluation pt was in bed but accepting to getting up and moving around. Pt only oriented to herself at this time. Cognition was/will be a barrier to future treatment but otherwise no perceived limitations. Patient presents with decreased lower extremity strength, impaired trunk control due to weakness, decreased ability to carry out bed mobility, and impaired functional transfers. Pt was min A x1 for majority of all bed mobility, transfers, and short ambulation with verbal and tactile cuing for patient safety. Per chart patient's daughter lives down the road and will aid in taking care of patient when appropriate to dc. Pt is appropriate for skilled physical therapy at this time in order to improve her current level of function and move towards her prior level of independence. Anticipated need for care takers and/or home health services to aid in functional mobility and safety pending patient cognitive improvement.

## 2019-09-03 NOTE — PLAN OF CARE
Problem: Patient Care Overview  Goal: Plan of Care Review  Outcome: Ongoing (interventions implemented as appropriate)   09/03/19 4372   Coping/Psychosocial   Plan of Care Reviewed With patient   OTHER   Outcome Summary Pt presents to OT with decreased strength and endurance for functional tasks. Per pt and family pt lives alone and needs to be independent with adls. Pt with limited shld ROM and vision. Pt may benefit from OT for adls and safety with functional transfers

## 2019-09-03 NOTE — PLAN OF CARE
Problem: Patient Care Overview  Goal: Plan of Care Review  Outcome: Ongoing (interventions implemented as appropriate)   09/03/19 0326   Coping/Psychosocial   Plan of Care Reviewed With patient   Plan of Care Review   Progress improving   OTHER   Outcome Summary No c/o pain. Bilateral lungs diminished, breathing treatments scheduled. IVF and IV abx continued. Tolerating regular diet. Up with walker to BSC. Turning q2hrs. Accumax maintained. Will cont to monitor.      Goal: Individualization and Mutuality  Outcome: Ongoing (interventions implemented as appropriate)    Goal: Discharge Needs Assessment  Outcome: Ongoing (interventions implemented as appropriate)    Goal: Interprofessional Rounds/Family Conf  Outcome: Ongoing (interventions implemented as appropriate)      Problem: Pneumonia (Adult)  Goal: Signs and Symptoms of Listed Potential Problems Will be Absent, Minimized or Managed (Pneumonia)  Outcome: Ongoing (interventions implemented as appropriate)      Problem: Activity Intolerance (Adult)  Goal: Activity Tolerance  Outcome: Ongoing (interventions implemented as appropriate)    Goal: Effective Energy Conservation Techniques  Outcome: Ongoing (interventions implemented as appropriate)      Problem: Fall Risk (Adult)  Goal: Absence of Fall  Outcome: Ongoing (interventions implemented as appropriate)

## 2019-09-03 NOTE — THERAPY EVALUATION
Patient Name: Dinah Mcnamara  : 1922    MRN: [id number]                              Today's Date: 9/3/2019       Admit Date: 2019    Visit Dx:     ICD-10-CM ICD-9-CM   1. Pneumonia of right lower lobe due to infectious organism (CMS/HCC) J18.1 486     Patient Active Problem List   Diagnosis   • Acute gallstone pancreatitis   • SIRS (systemic inflammatory response syndrome) (CMS/HCC)   • Colitis   • Hypokalemia   • Dehydration   • CAD (coronary artery disease)   • HTN (hypertension)   • Essential hypertension   • Pneumonia of right lower lobe due to infectious organism (CMS/HCC)     Past Medical History:   Diagnosis Date   • Blindness     rt eye   • CAD (coronary artery disease)    • Diverticulitis    • Hypertension    • Pancreatitis    • Stroke (CMS/HCC)      Past Surgical History:   Procedure Laterality Date   • CARDIAC SURGERY     • CHOLECYSTECTOMY     • CORONARY ANGIOPLASTY WITH STENT PLACEMENT     • SHOULDER SURGERY       General Information     Row Name 19 1524          PT Evaluation Time/Intention    Document Type  evaluation  (Pended)   -DB     Mode of Treatment  physical therapy  (Pended)   -DB     Row Name 19 1524          General Information    Patient Profile Reviewed?  yes  (Pended)   -DB     Prior Level of Function  independent:  (Pended)   -DB     Existing Precautions/Restrictions  fall  (Pended)   -DB     Barriers to Rehab  cognitive status;visual deficit;hearing deficit  (Pended)   -DB     Row Name 19 1524          Relationship/Environment    Lives With  alone  (Pended)   -DB     Row Name 19 1524          Resource/Environmental Concerns    Current Living Arrangements  home/apartment/condo  (Pended)   -DB     Row Name 19 1524          Home Main Entrance    Number of Stairs, Main Entrance  three  (Pended)   -DB     Row Name 19 1524          Stairs Within Home, Primary    Number of Stairs, Within Home, Primary  none  (Pended)   -DB     Row Name 19  1524          Cognitive Assessment/Intervention- PT/OT    Orientation Status (Cognition)  oriented to;person  (Pended)   -DB     Row Name 09/03/19 1524          Safety Issues, Functional Mobility    Safety Issues Affecting Function (Mobility)  awareness of need for assistance;insight into deficits/self awareness;problem solving;ability to follow commands  (Pended)   -DB     Impairments Affecting Function (Mobility)  cognition;strength;postural/trunk control  (Pended)   -DB       User Key  (r) = Recorded By, (t) = Taken By, (c) = Cosigned By    Initials Name Provider Type    DB Erlin Dempsey, PT Student PT Student        Mobility     Row Name 09/03/19 1525          Bed Mobility Assessment/Treatment    Bed Mobility Assessment/Treatment  bed mobility (all) activities  (Pended)   -DB     [place] Level (Bed Mobility)  contact guard assist  (Pended)   -DB     [facility] Name 09/03/19 1525          Sit-Stand Transfer    Sit-Stand [place] (Transfers)  minimum assist (75% patient effort)  (Pended)   -DB     Assistive Device (Sit-Stand Transfers)  walker, front-wheeled  (Pended)   -DB     [facility] Name 09/03/19 1525          Gait/Stairs Assessment/Training    Gait/Stairs Assessment/Training  gait/ambulation independence  (Pended)   -DB     [place] Level (Gait)  contact guard  (Pended)   -DB     Assistive Device (Gait)  walker, front-wheeled  (Pended)   -DB     Distance in Feet (Gait)  ~6 ft  (Pended)   -DB       User Key  (r) = Recorded By, (t) = Taken By, (c) = Cosigned By    Initials Name Provider Type    Erlin Alves, PT Student PT Student        Obj/Interventions     Row Name 09/03/19 1526          General ROM    GENERAL ROM COMMENTS  LE grossly WFL  (Pended)   -DB     [facility] Name 09/03/19 1526          MMT (Manual Muscle Testing)    General MMT Comments  LE grossly 3-/5  (Pended)   -DB     [facility] Name 09/03/19 1526          Static Sitting Balance    Level of [place] (Unsupported Sitting, Static Balance)   contact guard assist  (Pended)   -DB     Sitting Position (Unsupported Sitting, Static Balance)  sitting on edge of bed  (Pended)   -DB     Row Name 09/03/19 1526          Dynamic Sitting Balance    Level of [place], Reaches Outside Midline (Sitting, Dynamic Balance)  contact guard assist  (Pended)   -DB     Sitting Position, Reaches Outside Midline (Sitting, Dynamic Balance)  sitting on edge of bed  (Pended)   -DB     [facility] Name 09/03/19 1526          Static Standing Balance    Level of [place] (Supported Standing, Static Balance)  contact guard assist  (Pended)   -DB     Assistive Device Utilized (Supported Standing, Static Balance)  other (see comments)  (Pended)  FWW  -DB     Row Name 09/03/19 1526          Dynamic Standing Balance    Level of [place], Reaches Outside Midline (Standing, Dynamic Balance)  contact guard assist  (Pended)   -DB     Assistive Device Utilized (Supported Standing, Dynamic Balance)  other (see comments)  (Pended)  FWW  -DB       User Key  (r) = Recorded By, (t) = Taken By, (c) = Cosigned By    Initials Name Provider Type    DB Erlin Dempsey, PT Student PT Student        Goals/Plan     [facility] Name 09/03/19 1529          Bed Mobility Goal 1 (PT)    Activity/Assistive Device (Bed Mobility Goal 1, PT)  bed mobility activities, all  (Pended)   -DB     [place] Level/Cues Needed (Bed Mobility Goal 1, PT)  supervision required  (Pended)   -DB     Time Frame (Bed Mobility Goal 1, PT)  1 week  (Pended)   -DB     Row Name 09/03/19 1529          Transfer Goal 1 (PT)    Activity/Assistive Device (Transfer Goal 1, PT)  transfers, all  (Pended)   -DB     [place] Level/Cues Needed (Transfer Goal 1, PT)  supervision required  (Pended)   -DB     Time Frame (Transfer Goal 1, PT)  1 week  (Pended)   -DB     [facility] Name 09/03/19 1529          Gait Training Goal 1 (PT)    Activity/Assistive Device (Gait Training Goal 1, PT)  gait (walking locomotion);assistive device use  (Pended)   -DB      [place] Level (Gait Training Goal 1, PT)  supervision required  (Pended)   -DB     Time Frame (Gait Training Goal 1, PT)  1 week  (Pended)   -DB     Barriers (Gait Training Goal 1, PT)  150 ft  (Pended)   -DB     Row Name 09/03/19 1529          Stairs Goal 1 (PT)    Activity/Assistive Device (Stairs Goal 1, PT)  stairs, all skills  (Pended)   -DB     [place] Level/Cues Needed (Stairs Goal 1, PT)  tactile cues required;contact guard assist;verbal cues required  (Pended)   -DB     Time Frame (Stairs Goal 1, PT)  1 week  (Pended)   -DB       User Key  (r) = Recorded By, (t) = Taken By, (c) = Cosigned By    Initials Name Provider Type    Erlin Alves, PT Student PT Student        Clinical Impression     Row Name 09/03/19 1528          Plan of Care Review    Plan of Care Reviewed With  patient  (Pended)   -DB     Row Name 09/03/19 1528          Physical Therapy Clinical Impression    Patient/Family Goals Statement (PT Clinical Impression)  Pneumonia of right lower lobe. Pt to dc home with caretenders when appropriate.  (Pended)   -DB     Criteria for Skilled Interventions Met (PT Clinical Impression)  yes;treatment indicated  (Pended)   -DB     Row Name 09/03/19 1528          Vital Signs    Pre Patient Position  Supine  (Pended)   -DB     Intra Patient Position  Standing  (Pended)   -DB     Post Patient Position  Sitting  (Pended)   -DB     Row Name 09/03/19 1528          Positioning and Restraints    Pre-Treatment Position  in bed  (Pended)   -DB     Post Treatment Position  chair  (Pended)   -DB     In Chair  reclined;sitting;call light within reach;encouraged to call for assist;exit alarm on  (Pended)   -DB       User Key  (r) = Recorded By, (t) = Taken By, (c) = Cosigned By    Initials Name Provider Type    Erlin Alves, PT Student PT Student        Outcome Measures     Row Name 09/03/19 1531          How much help from another person do you currently need...    Turning from your back to your  side while in flat bed without using bedrails?  3  (Pended)   -DB     Moving from lying on back to sitting on the side of a flat bed without bedrails?  3  (Pended)   -DB     Moving to and from a bed to a chair (including a wheelchair)?  2  (Pended)   -DB     Standing up from a chair using your arms (e.g., wheelchair, bedside chair)?  3  (Pended)   -DB     Climbing 3-5 steps with a railing?  2  (Pended)   -DB     To walk in hospital room?  2  (Pended)   -DB     AM-PAC 6 Clicks Score (PT)  15  (Pended)   -DB     Row Name 09/03/19 1531          Functional Assessment    Outcome Measure Options  AM-PAC 6 Clicks Basic Mobility (PT)  (Pended)   -DB       User Key  (r) = Recorded By, (t) = Taken By, (c) = Cosigned By    Initials Name Provider Type    DB Erlin Dempsey, PT Student PT Student        Physical Therapy Education     Title: PT OT SLP Therapies (In Progress)     Topic: Physical Therapy (In Progress)     Point: Mobility training (In Progress)     Learning Progress Summary           Patient Acceptance, E, NR by DB at 9/3/2019  3:31 PM                   Point: Body mechanics (In Progress)     Learning Progress Summary           Patient Acceptance, E, NR by DB at 9/3/2019  3:31 PM                   Point: Precautions (In Progress)     Learning Progress Summary           Patient Acceptance, E, NR by DB at 9/3/2019  3:31 PM                               User Key     Initials Effective Dates Name Provider Type Discipline    DB 08/19/19 -  Erlin Dempsey, PT Student PT Student PT              PT Recommendation and Plan     Outcome Summary/Treatment Plan (PT)  Anticipated Discharge Disposition (PT): (P) home with assist, home with home health, anticipate therapy at next level of care  Plan of Care Reviewed With: (P) patient  Outcome Summary: (P) Pt is pleasantly confused 89 year old female with dx of right lower lobe pneumonia. Pt states she was independent and living home alone prior to hospitalization. Upon evaluation  pt was in bed but accepting to getting up and moving around. Pt only oriented to herself at this time. Cognition was/will be a barrier to future treatment but otherwise no perceived limitations. Patient presents with decreased lower extremity strength, impaired trunk control due to weakness, decreased ability to carry out bed mobility, and impaired functional transfers. Pt was min A x1 for majority of all bed mobility, transfers, and short ambulation with verbal and tactile cuing for patient safety. Per chart patient's daughter lives down the road and will aid in taking care of patient when appropriate to dc. Pt is appropriate for skilled physical therapy at this time in order to improve her current level of function and move towards her prior level of independence. Anticipated need for care takers and/or home health services to aid in functional mobility and safety pending patient cognitive improvement.     Time Calculation:   PT Charges     Row Name 09/03/19 1523             Time Calculation    Start Time  1503  (Pended)   -DB      Stop Time  1522  (Pended)   -DB      Time Calculation (min)  19 min  (Pended)   -DB      PT Received On  09/03/19  (Pended)   -DB      PT - Next Appointment  09/04/19  (Pended)   -DB      PT Goal Re-Cert Due Date  09/10/19  (Pended)   -DB        User Key  (r) = Recorded By, (t) = Taken By, (c) = Cosigned By    Initials Name Provider Type    DB Erlin Dempsey, PT Student PT Student        Therapy Charges for Today     Code Description Service Date Service Provider Modifiers Qty    00600937627 HC PT EVAL MOD COMPLEXITY 2 9/3/2019 Erlin Dempsey, PT Student GP 1    [id number]  PT THER PROC EA 15 MIN 9/3/2019 Erlin Dempsey, PT Student GP 1          PT G-Codes  Outcome Measure Options: (P) AM-PAC 6 Clicks Basic Mobility (PT)  AM-PAC 6 Clicks Score (PT): (P) 15    Erlin Dempsey PT Student  9/3/2019

## 2019-09-03 NOTE — THERAPY EVALUATION
Acute Care - Occupational Therapy Initial Evaluation  [facility]     Patient Name: Dinah Mcnamara  : 1922  MRN: [id number]  Today's Date: 9/3/2019             Admit Date: 2019       ICD-10-CM ICD-9-CM   1. Pneumonia of right lower lobe due to infectious organism (CMS/HCC) J18.1 486     Patient Active Problem List   Diagnosis   • Acute gallstone pancreatitis   • SIRS (systemic inflammatory response syndrome) (CMS/HCC)   • Colitis   • Hypokalemia   • Dehydration   • CAD (coronary artery disease)   • HTN (hypertension)   • Essential hypertension   • Pneumonia of right lower lobe due to infectious organism (CMS/HCC)     Past Medical History:   Diagnosis Date   • Blindness     rt eye   • CAD (coronary artery disease)    • Diverticulitis    • Hypertension    • Pancreatitis    • Stroke (CMS/HCC)      Past Surgical History:   Procedure Laterality Date   • CARDIAC SURGERY     • CHOLECYSTECTOMY     • CORONARY ANGIOPLASTY WITH STENT PLACEMENT     • SHOULDER SURGERY            OT ASSESSMENT FLOWSHEET (last 12 hours)      Occupational Therapy Evaluation     Row Name 19 1543                   OT Evaluation Time/Intention    Subjective Information  complains of;weakness;fatigue  -SG        Document Type  evaluation  -SG        Mode of Treatment  individual therapy;occupational therapy  -SG        Patient Effort  fair OT limited due to pt gaurded movement RUE due to IV  -SG           General Information    Patient Profile Reviewed?  yes  -SG        Patient Observations  alert;cooperative;agree to therapy  -SG        General Observations of Patient  reclined in chair  -SG        Prior Level of Function  independent:;ADL's  -SG           Cognitive Assessment/Intervention- PT/OT    Orientation Status (Cognition)  person;place  -SG        Follows Commands (Cognition)  follows one step commands;repetition of directions required [place]  -SG           ADL Assessment/Intervention    BADL Assessment/Intervention  -- pt  refused adls due to IV placement RUE  -SG           BADL Safety/Performance    Impairments, BADL Safety/Performance  endurance/activity tolerance;range of motion  -SG           General ROM    GENERAL ROM COMMENTS  right shld 1/2 AROM, Left shld 1/3 AROM due to CGA and prev. shld sx  -SG           Static Sitting Balance    Level of [place] (Unsupported Sitting, Static Balance)  supervision  -SG           Sensory Assessment/Intervention    Additional Documentation  Vision Assessment/Intervention (Group)  -SG           Vision Assessment/Intervention    Visual Treatment Interventions  -- right eye does not open. blind right eye per pt/family  -SG           Positioning and Restraints    Pre-Treatment Position  sitting in chair/recliner  -SG        Post Treatment Position  chair  -SG        In Chair  call light within reach;encouraged to call for assist;with family/caregiver  -SG           Clinical Impression (OT)    OT Diagnosis  need for assist with personal care  -        Criteria for Skilled Therapeutic Interventions Met (OT Eval)  yes;treatment indicated  -        Therapy Frequency (OT Eval)  5 times/wk  -SG        Care Plan Review (OT)  evaluation/treatment results reviewed  -SG           Planned OT Interventions    Planned Therapy Interventions (OT Eval)  activity tolerance training;BADL retraining;transfer/mobility retraining  -SG           OT Goals    Transfer Goal Selection (OT)  transfer, OT goal 1  -SG        Dressing Goal Selection (OT)  dressing, OT goal 1  -SG        Toileting Goal Selection (OT)  toileting, OT goal 1  -SG           Transfer Goal 1 (OT)    Activity/Assistive Device (Transfer Goal 1, OT)  toilet  -        [place] Level/Cues Needed (Transfer Goal 1, OT)  minimum assist (75% or more patient effort)  -SG        Time Frame (Transfer Goal 1, OT)  1 week  -SG        Progress/Outcome (Transfer Goal 1, OT)  goal ongoing  -SG           Dressing Goal 1 (OT)    Activity/Assistive Device  (Dressing Goal 1, OT)  dressing skills, all  -SG        [place]/Cues Needed (Dressing Goal 1, OT)  minimum assist (75% or more patient effort)  -SG        Time Frame (Dressing Goal 1, OT)  1 week  -SG        Progress/Outcome (Dressing Goal 1, OT)  goal ongoing  -SG           Toileting Goal 1 (OT)    Activity/Device (Toileting Goal 1, OT)  toileting skills, all  -SG        [place] Level/Cues Needed (Toileting Goal 1, OT)  minimum assist (75% or more patient effort)  -SG        Time Frame (Toileting Goal 1, OT)  1 week  -SG        Progress/Outcome (Toileting Goal 1, OT)  goal ongoing  -SG          User Key  (r) = Recorded By, (t) = Taken By, (c) = Cosigned By    Initials Name Effective Dates    Latonia Fields OTR 06/08/18 -          Occupational Therapy Education     Title: PT OT SLP Therapies (In Progress)     Topic: Occupational Therapy (In Progress)     Point: ADL training (Done)     Description: Instruct learner(s) on proper safety adaptation and remediation techniques during self care or transfers.   Instruct in proper use of assistive devices.    Learning Progress Summary           Patient Acceptance, E,TB, VU,NR by  at 9/3/2019  3:52 PM    Comment:  role of OT and POC, safety for adl                               User Key     Initials Effective Dates Name Provider Type Discipline     06/08/18 -  Latonia Harris OTR Occupational Therapist OT                  OT Recommendation and Plan  Planned Therapy Interventions (OT Eval): activity tolerance training, BADL retraining, transfer/mobility retraining  Therapy Frequency (OT Eval): 5 times/wk  Plan of Care Review  Plan of Care Reviewed With: patient  Plan of Care Reviewed With: patient  Outcome Summary: Pt presents to OT with decreased strength and endurance for functional tasks. Per pt and family pt lives alone and needs to be independent with adls. Pt with limited shld ROM and vision. Pt may benefit from OT for adls and safety with  functional transfers    Outcome Measures     Row Name 09/03/19 1554             How much help from another is currently needed...    Putting on and taking off regular lower body clothing?  2  -SG      Bathing (including washing, rinsing, and drying)  2  -SG      Toileting (which includes using toilet bed pan or urinal)  3  -SG      Putting on and taking off regular upper body clothing  3  -SG      Taking care of personal grooming (such as brushing teeth)  3  -SG      Eating meals  3  -SG      AM-PAC 6 Clicks Score (OT)  16  -SG         Functional Assessment    Outcome Measure Options  AM-PAC 6 Clicks Daily Activity (OT)  -SG        User Key  (r) = Recorded By, (t) = Taken By, (c) = Cosigned By    Initials Name Provider Type    Latonia Fields OTR Occupational Therapist          Time Calculation:   Time Calculation- OT     Row Name 09/03/19 1555             Time Calculation- OT    OT Start Time  1052  -      OT Stop Time  1107  -      OT Time Calculation (min)  15 min  -      Total Timed Code Minutes- OT  8 minute(s)  -      OT Received On  09/03/19  -      OT Goal Re-Cert Due Date  09/10/19  -        User Key  (r) = Recorded By, (t) = Taken By, (c) = Cosigned By    Initials Name Provider Type    Latonia Fields OTR Occupational Therapist        Therapy Charges for Today     Code Description Service Date Service Provider Modifiers Qty    58661382991  OT EVAL LOW COMPLEXITY 2 9/3/2019 Latonia Harris OTR GO 1    [id number]  OT SELF CARE/MGMT/TRAIN EA 15 MIN 9/3/2019 Latonia Harris OTR GO 1               ANGIE Schafer  9/3/2019

## 2019-09-04 LAB
ANION GAP SERPL CALCULATED.3IONS-SCNC: 11 MMOL/L (ref 5–15)
BASOPHILS # BLD AUTO: 0.04 10*3/MM3 (ref 0–0.2)
BASOPHILS NFR BLD AUTO: 0.5 % (ref 0–1.5)
BUN BLD-MCNC: 17 MG/DL (ref 8–23)
BUN/CREAT SERPL: 20.7 (ref 7–25)
CALCIUM SPEC-SCNC: 9.4 MG/DL (ref 8.2–9.6)
CHLORIDE SERPL-SCNC: 110 MMOL/L (ref 98–107)
CO2 SERPL-SCNC: 19 MMOL/L (ref 22–29)
CREAT BLD-MCNC: 0.82 MG/DL (ref 0.57–1)
DEPRECATED RDW RBC AUTO: 50.4 FL (ref 37–54)
EOSINOPHIL # BLD AUTO: 0.07 10*3/MM3 (ref 0–0.4)
EOSINOPHIL NFR BLD AUTO: 0.8 % (ref 0.3–6.2)
ERYTHROCYTE [DISTWIDTH] IN BLOOD BY AUTOMATED COUNT: 14 % (ref 12.3–15.4)
GFR SERPL CREATININE-BSD FRML MDRD: 65 ML/MIN/1.73
GLUCOSE BLD-MCNC: 122 MG/DL (ref 65–99)
HCT VFR BLD AUTO: 30.6 % (ref 34–46.6)
HGB BLD-MCNC: 9.9 G/DL (ref 12–15.9)
IMM GRANULOCYTES # BLD AUTO: 0.06 10*3/MM3 (ref 0–0.05)
IMM GRANULOCYTES NFR BLD AUTO: 0.7 % (ref 0–0.5)
LYMPHOCYTES # BLD AUTO: 1.45 10*3/MM3 (ref 0.7–3.1)
LYMPHOCYTES NFR BLD AUTO: 16.5 % (ref 19.6–45.3)
MCH RBC QN AUTO: 31.9 PG (ref 26.6–33)
MCHC RBC AUTO-ENTMCNC: 32.4 G/DL (ref 31.5–35.7)
MCV RBC AUTO: 98.7 FL (ref 79–97)
MONOCYTES # BLD AUTO: 0.88 10*3/MM3 (ref 0.1–0.9)
MONOCYTES NFR BLD AUTO: 10 % (ref 5–12)
NEUTROPHILS # BLD AUTO: 6.27 10*3/MM3 (ref 1.7–7)
NEUTROPHILS NFR BLD AUTO: 71.5 % (ref 42.7–76)
NRBC BLD AUTO-RTO: 0 /100 WBC (ref 0–0.2)
NT-PROBNP SERPL-MCNC: 2976 PG/ML (ref 5–1800)
PLATELET # BLD AUTO: 189 10*3/MM3 (ref 140–450)
PMV BLD AUTO: 10.4 FL (ref 6–12)
POTASSIUM BLD-SCNC: 3.9 MMOL/L (ref 3.5–5.2)
RBC # BLD AUTO: 3.1 10*6/MM3 (ref 3.77–5.28)
SODIUM BLD-SCNC: 140 MMOL/L (ref 136–145)
WBC NRBC COR # BLD: 8.77 10*3/MM3 (ref 3.4–10.8)

## 2019-09-04 PROCEDURE — 97110 THERAPEUTIC EXERCISES: CPT

## 2019-09-04 PROCEDURE — 94799 UNLISTED PULMONARY SVC/PX: CPT

## 2019-09-04 PROCEDURE — 80048 BASIC METABOLIC PNL TOTAL CA: CPT | Performed by: HOSPITALIST

## 2019-09-04 PROCEDURE — 83880 ASSAY OF NATRIURETIC PEPTIDE: CPT | Performed by: HOSPITALIST

## 2019-09-04 PROCEDURE — 25010000002 FUROSEMIDE PER 20 MG: Performed by: HOSPITALIST

## 2019-09-04 PROCEDURE — 85025 COMPLETE CBC W/AUTO DIFF WBC: CPT | Performed by: HOSPITALIST

## 2019-09-04 RX ORDER — HYDROCODONE BITARTRATE AND ACETAMINOPHEN 5; 325 MG/1; MG/1
1 TABLET ORAL EVERY 6 HOURS PRN
Status: DISCONTINUED | OUTPATIENT
Start: 2019-09-04 | End: 2019-09-05

## 2019-09-04 RX ORDER — BACLOFEN 10 MG/1
5 TABLET ORAL 4 TIMES DAILY PRN
Status: DISCONTINUED | OUTPATIENT
Start: 2019-09-04 | End: 2019-09-06 | Stop reason: HOSPADM

## 2019-09-04 RX ORDER — FUROSEMIDE 10 MG/ML
40 INJECTION INTRAMUSCULAR; INTRAVENOUS ONCE
Status: COMPLETED | OUTPATIENT
Start: 2019-09-04 | End: 2019-09-04

## 2019-09-04 RX ADMIN — Medication 400 MG: at 08:58

## 2019-09-04 RX ADMIN — MONTELUKAST SODIUM 10 MG: 10 TABLET, FILM COATED ORAL at 20:36

## 2019-09-04 RX ADMIN — IPRATROPIUM BROMIDE AND ALBUTEROL SULFATE 3 ML: 2.5; .5 SOLUTION RESPIRATORY (INHALATION) at 03:44

## 2019-09-04 RX ADMIN — IPRATROPIUM BROMIDE AND ALBUTEROL SULFATE 3 ML: 2.5; .5 SOLUTION RESPIRATORY (INHALATION) at 15:59

## 2019-09-04 RX ADMIN — CLOPIDOGREL 75 MG: 75 TABLET, FILM COATED ORAL at 08:59

## 2019-09-04 RX ADMIN — BACLOFEN 5 MG: 10 TABLET ORAL at 16:35

## 2019-09-04 RX ADMIN — AMLODIPINE BESYLATE 10 MG: 10 TABLET ORAL at 16:35

## 2019-09-04 RX ADMIN — FERROUS SULFATE TAB 325 MG (65 MG ELEMENTAL FE) 325 MG: 325 (65 FE) TAB at 20:36

## 2019-09-04 RX ADMIN — IPRATROPIUM BROMIDE AND ALBUTEROL SULFATE 3 ML: 2.5; .5 SOLUTION RESPIRATORY (INHALATION) at 12:24

## 2019-09-04 RX ADMIN — VITAMIN D, TAB 1000IU (100/BT) 1000 UNITS: 25 TAB at 16:35

## 2019-09-04 RX ADMIN — PANTOPRAZOLE SODIUM 40 MG: 40 TABLET, DELAYED RELEASE ORAL at 06:27

## 2019-09-04 RX ADMIN — ISOSORBIDE MONONITRATE 30 MG: 30 TABLET ORAL at 08:59

## 2019-09-04 RX ADMIN — FUROSEMIDE 40 MG: 10 INJECTION, SOLUTION INTRAMUSCULAR; INTRAVENOUS at 15:08

## 2019-09-04 RX ADMIN — METOPROLOL TARTRATE 12.5 MG: 25 TABLET ORAL at 08:58

## 2019-09-04 RX ADMIN — IPRATROPIUM BROMIDE AND ALBUTEROL SULFATE 3 ML: 2.5; .5 SOLUTION RESPIRATORY (INHALATION) at 19:59

## 2019-09-04 RX ADMIN — FERROUS SULFATE TAB 325 MG (65 MG ELEMENTAL FE) 325 MG: 325 (65 FE) TAB at 08:59

## 2019-09-04 RX ADMIN — SODIUM CHLORIDE, PRESERVATIVE FREE 10 ML: 5 INJECTION INTRAVENOUS at 08:59

## 2019-09-04 RX ADMIN — LEVOTHYROXINE SODIUM 75 MCG: 75 TABLET ORAL at 06:27

## 2019-09-04 RX ADMIN — IPRATROPIUM BROMIDE AND ALBUTEROL SULFATE 3 ML: 2.5; .5 SOLUTION RESPIRATORY (INHALATION) at 08:38

## 2019-09-04 RX ADMIN — METOPROLOL TARTRATE 12.5 MG: 25 TABLET ORAL at 20:36

## 2019-09-04 RX ADMIN — LOSARTAN POTASSIUM 50 MG: 50 TABLET, FILM COATED ORAL at 08:58

## 2019-09-04 RX ADMIN — GUAIFENESIN 600 MG: 600 TABLET, EXTENDED RELEASE ORAL at 16:35

## 2019-09-04 RX ADMIN — ASPIRIN 81 MG: 81 TABLET, CHEWABLE ORAL at 08:59

## 2019-09-04 RX ADMIN — Medication 1 CAPSULE: at 08:58

## 2019-09-04 NOTE — PLAN OF CARE
Problem: Patient Care Overview  Goal: Plan of Care Review  Outcome: Ongoing (interventions implemented as appropriate)   09/04/19 0576   Coping/Psychosocial   Plan of Care Reviewed With patient   Plan of Care Review   Progress improving   OTHER   Outcome Summary vss, iv antibiotic given, up with assist, no c/o pain, fall precaution maintained, turned q2h, will continue to monitor the pt.     Goal: Individualization and Mutuality  Outcome: Ongoing (interventions implemented as appropriate)    Goal: Discharge Needs Assessment  Outcome: Ongoing (interventions implemented as appropriate)    Goal: Interprofessional Rounds/Family Conf  Outcome: Ongoing (interventions implemented as appropriate)      Problem: Pneumonia (Adult)  Goal: Signs and Symptoms of Listed Potential Problems Will be Absent, Minimized or Managed (Pneumonia)  Outcome: Ongoing (interventions implemented as appropriate)      Problem: Activity Intolerance (Adult)  Goal: Activity Tolerance  Outcome: Ongoing (interventions implemented as appropriate)    Goal: Effective Energy Conservation Techniques  Outcome: Ongoing (interventions implemented as appropriate)      Problem: Fall Risk (Adult)  Goal: Absence of Fall  Outcome: Ongoing (interventions implemented as appropriate)      Problem: Nutrition, Imbalanced: Inadequate Oral Intake (Adult)  Goal: Identify Related Risk Factors and Signs and Symptoms  Outcome: Ongoing (interventions implemented as appropriate)    Goal: Improved Oral Intake  Outcome: Ongoing (interventions implemented as appropriate)    Goal: Prevent Further Weight Loss  Outcome: Ongoing (interventions implemented as appropriate)

## 2019-09-04 NOTE — PLAN OF CARE
Problem: Patient Care Overview  Goal: Plan of Care Review  Outcome: Ongoing (interventions implemented as appropriate)   09/04/19 1600   Coping/Psychosocial   Plan of Care Reviewed With patient;daughter   Plan of Care Review   Progress improving   OTHER   Outcome Summary pt up to BR and in chair freq, one time dose of lasix given, dtr at bedside, skin intact, c/o neck/shoulder pain-waiting for pharm to send baclofen, falls risk     Goal: Individualization and Mutuality  Outcome: Ongoing (interventions implemented as appropriate)    Goal: Discharge Needs Assessment  Outcome: Ongoing (interventions implemented as appropriate)      Problem: Pneumonia (Adult)  Goal: Signs and Symptoms of Listed Potential Problems Will be Absent, Minimized or Managed (Pneumonia)  Outcome: Ongoing (interventions implemented as appropriate)      Problem: Fall Risk (Adult)  Goal: Absence of Fall  Outcome: Ongoing (interventions implemented as appropriate)      Problem: Skin Injury Risk (Adult)  Goal: Identify Related Risk Factors and Signs and Symptoms  Outcome: Outcome(s) achieved Date Met: 09/04/19    Goal: Skin Health and Integrity  Outcome: Ongoing (interventions implemented as appropriate)

## 2019-09-04 NOTE — PLAN OF CARE
Problem: Patient Care Overview  Goal: Plan of Care Review   09/04/19 7933   Coping/Psychosocial   Plan of Care Reviewed With patient   Plan of Care Review   Progress improving   OTHER   Outcome Summary Pt. able to ambulate 50 feet, CGA x 2, with use of Rwx this date. Pt. requires CGA x 2 for sit <-> stand transfers. Verbal/tactile cues for posture correction and Rwx guidance during ambulation. BUE/LE ther. ex. program x 10 reps completed for general strengthening.

## 2019-09-04 NOTE — THERAPY TREATMENT NOTE
Patient Name: Dinah Mcnamara  : 1922    MRN: [id number]                              Today's Date: 2019       Admit Date: 2019    Visit Dx:     ICD-10-CM ICD-9-CM   1. Pneumonia of right lower lobe due to infectious organism (CMS/HCC) J18.1 486     Patient Active Problem List   Diagnosis   • Acute gallstone pancreatitis   • SIRS (systemic inflammatory response syndrome) (CMS/HCC)   • Colitis   • Hypokalemia   • Dehydration   • CAD (coronary artery disease)   • HTN (hypertension)   • Essential hypertension   • Pneumonia of right lower lobe due to infectious organism (CMS/HCC)     Past Medical History:   Diagnosis Date   • Blindness     rt eye   • CAD (coronary artery disease)    • Diverticulitis    • Hypertension    • Pancreatitis    • Stroke (CMS/HCC)      Past Surgical History:   Procedure Laterality Date   • CARDIAC SURGERY     • CHOLECYSTECTOMY     • CORONARY ANGIOPLASTY WITH STENT PLACEMENT     • SHOULDER SURGERY       General Information     Row Name 19 1645          PT Evaluation Time/Intention    Document Type  therapy note (daily note) Pt. reports pain/soreness in her Posterior neck and bilateral traps this PM. Otherwise, pt. agreeable to work with P.T.  -MS     Mode of Treatment  physical therapy;individual therapy  -MS     Row Name 19 1645          General Information    Existing Precautions/Restrictions  fall  (Significant)  Exit alarm  -MS     Row Name 19 1644          Cognitive Assessment/Intervention- PT/OT    Orientation Status (Cognition)  oriented to;person;place  -MS     Row Name 19 1649          Safety Issues, Functional Mobility    Comment, Safety Issues/Impairments (Mobility)  Gait belt used for safety.  -MS       User Key  (r) = Recorded By, (t) = Taken By, (c) = Cosigned By    Initials Name Provider Type    Gallito Deleon, PT Physical Therapist        Mobility     Row Name 19 7458          Bed Mobility Assessment/Treatment    Comment (Bed  Mobility)  Pt. up in chair this PM.  -MS     Row Name 09/04/19 1646          Sit-Stand Transfer    Sit-Stand [place] (Transfers)  contact guard;2 person assist  -MS     Assistive Device (Sit-Stand Transfers)  walker, front-wheeled  -MS     Row Name 09/04/19 1646          Gait/Stairs Assessment/Training    [place] Level (Gait)  contact guard;2 person assist  -MS     Assistive Device (Gait)  walker, front-wheeled  -MS     Distance in Feet (Gait)  50 feet  -MS     Pattern (Gait)  step-through  -MS     Bilateral Gait Deviations  forward flexed posture  -MS     Comment (Gait/Stairs)  Verbal/tactile cues for posture correction and Rwx guidance.  -MS       User Key  (r) = Recorded By, (t) = Taken By, (c) = Cosigned By    Initials Name Provider Type    Gallito Deleon, PT Physical Therapist        Obj/Interventions     Row Name 09/04/19 1647          Therapeutic Exercise    Comment (Therapeutic Exercise)  BUE/LE ther. ex. program x 10 reps (Shld Rolls, Scap. Retractions, LAQ's)  -MS       User Key  (r) = Recorded By, (t) = Taken By, (c) = Cosigned By    Initials Name Provider Type    Gallito Deleon, PT Physical Therapist        Goals/Plan    No documentation.       Clinical Impression     Row Name 09/04/19 1647          Pain Assessment    Additional Documentation  Pain Scale: Numbers Pre/Post-Treatment (Group)  -MS     Row Name 09/04/19 1647          Pain Scale: Numbers Pre/Post-Treatment    Pain Scale: Numbers, Pretreatment  4/10  -MS     Pain Scale: Numbers, Post-Treatment  4/10  -MS     Pain Location  neck  -MS     Pain Intervention(s)  Medication (See MAR);Repositioned;Rest  -MS     Row Name 09/04/19 1647          Positioning and Restraints    Pre-Treatment Position  sitting in chair/recliner  -MS     Post Treatment Position  chair  -MS     In Chair  notified nsg;reclined;call light within reach;encouraged to call for assist;exit alarm on All lines intact.  -MS       User Key  (r) = Recorded By, (t)  = Taken By, (c) = Cosigned By    Initials Name Provider Type    MS SwiftGallito, PT Physical Therapist        Outcome Measures     Row Name 09/04/19 1650          How much help from another person do you currently need...    Turning from your back to your side while in flat bed without using bedrails?  3  -MS     Moving from lying on back to sitting on the side of a flat bed without bedrails?  3  -MS     Moving to and from a bed to a chair (including a wheelchair)?  3  -MS     Standing up from a chair using your arms (e.g., wheelchair, bedside chair)?  3  -MS     Climbing 3-5 steps with a railing?  2  -MS     To walk in hospital room?  3  -MS     AM-PAC 6 Clicks Score (PT)  17  -MS     Row Name 09/04/19 1650          Functional Assessment    Outcome Measure Options  AM-PAC 6 Clicks Basic Mobility (PT)  -MS       User Key  (r) = Recorded By, (t) = Taken By, (c) = Cosigned By    Initials Name Provider Type    MS Swift Gallito REYES, PT Physical Therapist        Physical Therapy Education     Title: PT OT SLP Therapies (In Progress)     Topic: Physical Therapy (Done)     Point: Mobility training (Done)     Learning Progress Summary           Patient Acceptance, E,D, VU,NR by MS at 9/4/2019  4:48 PM    Acceptance, E, NR by DB at 9/3/2019  3:31 PM                   Point: Body mechanics (Done)     Learning Progress Summary           Patient Acceptance, E,D, VU,NR by MS at 9/4/2019  4:48 PM    Acceptance, E, NR by DB at 9/3/2019  3:31 PM                   Point: Precautions (Done)     Learning Progress Summary           Patient Acceptance, E,D, VU,NR by MS at 9/4/2019  4:48 PM    Acceptance, E, NR by DB at 9/3/2019  3:31 PM                               User Key     Initials Effective Dates Name Provider Type Discipline    MS 04/03/18 -  Gallito Swift, PT Physical Therapist PT    DB 08/19/19 -  Erlin Dempsey, [employer] Student PT Student PT              PT Recommendation and Plan     Plan of Care Reviewed With:  patient  Progress: improving  Outcome Summary: Pt. able to ambulate 50 feet, CGA x 2, with use of Rwx this date.  Pt. requires CGA x 2 for sit <-> stand transfers.  Verbal/tactile cues for posture correction and Rwx guidance during ambulation.  BUE/LE ther. ex. program x 10 reps completed for general strengthening.      Time Calculation:   PT Charges     Row Name 09/04/19 1650             Time Calculation    Start Time  1630  -MS      Stop Time  1645  -MS      Time Calculation (min)  15 min  -MS      PT Received On  09/04/19  -MS      PT - Next Appointment  09/05/19  -MS         Time Calculation- PT    Total Timed Code Minutes- PT  13 minute(s)  -MS        User Key  (r) = Recorded By, (t) = Taken By, (c) = Cosigned By    Initials Name Provider Type    Gallito Deleon, PT Physical Therapist        Therapy Charges for Today     Code Description Service Date Service Provider Modifiers Qty    21278942468 HC PT THER PROC EA 15 MIN 9/4/2019 Gallito Swift, PT GP 1    17587756781 HC PT THER SUPP EA 15 MIN 9/4/2019 Gallito Swift, PT GP 1          PT G-Codes  Outcome Measure Options: AM-PAC 6 Clicks Basic Mobility (PT)  AM-PAC 6 Clicks Score (PT): 17  AM-PAC 6 Clicks Score (OT): 16    Gallito Swift, PT  9/4/2019

## 2019-09-04 NOTE — PROGRESS NOTES
"Daily progress note    Chief complaint  Doing same  No new complaints  Family at bedside    History of present illness  89-year-old white female with history of coronary artery disease hypertension hypothyroidism and TIA in the past who lives by herself brought to the emergency room with fever chills cough and generalized weakness.  Patient stated that he started with abdominal pain about a week ago and treated for UTI with Macrobid then developed nausea fatigue cough and fever came yesterday.  Patient work-up in ER revealed right lower lobe pneumonia admit for management.  Patient fully alert oriented daughter at bedside and feeling better and no new complaints.  Patient denies any vomiting but does have nausea and thinks she choke with eating that cause cough.  Patient does not want any feeding tube hemodialysis CPR or intubation but she admits to get the swallow eval and get treatment for possible aspiration pneumonia.     REVIEW OF SYSTEMS  Remarkable for neck pain     PHYSICAL EXAM  Blood pressure 155/68, pulse 70, temperature 96.7 °F (35.9 °C), temperature source Oral, resp. rate 16, height 165.1 cm (65\"), weight 45.1 kg (99 lb 6.8 oz), SpO2 100 %, unknown if currently breastfeeding.    Constitutional: She is oriented to person, place, and time. No distress.   Head: Normocephalic and atraumatic.   Mouth/Throat: Mucous membranes are dry.   Eyes: EOM are normal. Pupils are equal, round, and reactive to light.   Neck: Normal range of motion. Neck supple.   Cardiovascular: Normal rate, regular rhythm and normal heart sounds.   Pulmonary/Chest: Effort normal and breath sounds normal. No respiratory distress.   Abdominal: Soft. There is tenderness (LLQ). There is no rebound and no guarding.   Musculoskeletal: Normal range of motion. She exhibits no edema.   Neurological: She is alert and oriented to person, place, and time. She has normal sensation and normal strength.   Skin: Skin is warm and dry. No rash noted. "   Psychiatric: Mood and affect normal.     LAB RESULTS  Lab Results (last 24 hours)     Procedure Component Value Units Date/Time    Basic Metabolic Panel [[id number]]  (Abnormal) Collected:  09/04/19 0538    Specimen:  Blood Updated:  09/04/19 0635     Glucose 122 mg/dL      BUN 17 mg/dL      Creatinine 0.82 mg/dL      Sodium 140 mmol/L      Potassium 3.9 mmol/L      Chloride 110 mmol/L      CO2 19.0 mmol/L      Calcium 9.4 mg/dL      eGFR Non African Amer 65 mL/min/1.73      BUN/Creatinine Ratio 20.7     Anion Gap 11.0 mmol/L     Narrative:       GFR Normal >60  Chronic Kidney Disease <60  Kidney Failure <15    BNP [[id number]]  (Abnormal) Collected:  09/04/19 0538    Specimen:  Blood Updated:  09/04/19 0632     proBNP 2,976.0 pg/mL     Narrative:       Among patients with dyspnea, NT-proBNP is highly sensitive for the detection of acute congestive heart failure. In addition NT-proBNP of <300 pg/ml effectively rules out acute congestive heart failure with 99% negative predictive value.    CBC & Differential [[id number]] Collected:  09/04/19 0538    Specimen:  Blood Updated:  09/04/19 0602    Narrative:       The following orders were created for panel order CBC & Differential.  Procedure                               Abnormality         Status                     ---------                               -----------         ------                     CBC Auto Differential[[id number]]        Abnormal            Final result                 Please view results for these tests on the individual orders.    CBC Auto Differential [[id number]]  (Abnormal) Collected:  09/04/19 0538    Specimen:  Blood Updated:  09/04/19 0602     WBC 8.77 10*3/mm3      RBC 3.10 10*6/mm3      Hemoglobin 9.9 g/dL      Hematocrit 30.6 %      MCV 98.7 fL      MCH 31.9 pg      MCHC 32.4 g/dL      RDW 14.0 %      RDW-SD 50.4 fl      MPV 10.4 fL      Platelets 189 10*3/mm3      Neutrophil % 71.5 %      Lymphocyte % 16.5 %      Monocyte % 10.0 %       Eosinophil % 0.8 %      Basophil % 0.5 %      Immature Grans % 0.7 %      Neutrophils, Absolute 6.27 10*3/mm3      Lymphocytes, Absolute 1.45 10*3/mm3      Monocytes, Absolute 0.88 10*3/mm3      Eosinophils, Absolute 0.07 10*3/mm3      Basophils, Absolute 0.04 10*3/mm3      Immature Grans, Absolute 0.06 10*3/mm3      nRBC 0.0 /100 WBC     Blood Culture - Blood, Arm, Left [[id number]] Collected:  09/01/19 1919    Specimen:  Blood from Arm, Left Updated:  09/03/19 1930     Blood Culture No growth at 2 days    Blood Culture - Blood, Arm, Left [[id number]] Collected:  09/01/19 1559    Specimen:  Blood from Arm, Left Updated:  09/03/19 1615     Blood Culture No growth at 2 days        Imaging Results (last 24 hours)     ** No results found for the last 24 hours. **            Current Facility-Administered Medications:   •  amLODIPine (NORVASC) tablet 10 mg, 10 mg, Oral, Q PM, Leon Chin MD, 10 mg at 09/03/19 1730  •  aspirin chewable tablet 81 mg, 81 mg, Oral, Daily, Leon Chin MD, 81 mg at 09/04/19 0859  •  cholecalciferol (VITAMIN D3) tablet 1,000 Units, 1,000 Units, Oral, Q PM, Leon Chin MD, 1,000 Units at 09/03/19 1730  •  clopidogrel (PLAVIX) tablet 75 mg, 75 mg, Oral, Daily, Leon Chin MD, 75 mg at 09/04/19 0859  •  ferrous sulfate tablet 325 mg, 325 mg, Oral, BID, Leon Chin MD, 325 mg at 09/04/19 0859  •  guaiFENesin (MUCINEX) 12 hr tablet 600 mg, 600 mg, Oral, Q PM, Leon Chin MD, 600 mg at 09/03/19 1730  •  ipratropium-albuterol (DUO-NEB) nebulizer solution 3 mL, 3 mL, Nebulization, Q4H PRN, Leon Chin MD  •  ipratropium-albuterol (DUO-NEB) nebulizer solution 3 mL, 3 mL, Nebulization, Q4H - RT, Leon Chin MD, 3 mL at 09/04/19 1224  •  isosorbide mononitrate (IMDUR) 24 hr tablet 30 mg, 30 mg, Oral, Daily, Leon Chin MD, 30 mg at 09/04/19 0859  •  lactobacillus acidophilus (RISAQUAD) capsule 1 capsule, 1 capsule, Oral, Daily, Leon Chin MD, 1 capsule at 09/04/19 0858  •  levoFLOXacin  (LEVAQUIN) 750 mg/150 mL D5W (premix) (LEVAQUIN) 750 mg, 750 mg, Intravenous, Q48H, Joshua Chin MD, 750 mg at 09/03/19 2026  •  levothyroxine (SYNTHROID, LEVOTHROID) tablet 75 mcg, 75 mcg, Oral, Q AM, Joshua Chin MD, 75 mcg at 09/04/19 0627  •  losartan (COZAAR) tablet 50 mg, 50 mg, Oral, Q24H, Joshua Chin MD, 50 mg at 09/04/19 0858  •  magnesium oxide (MAG-OX) tablet 400 mg, 400 mg, Oral, Daily, Joshua Chin MD, 400 mg at 09/04/19 0858  •  melatonin tablet 5 mg, 5 mg, Oral, Nightly PRN, Joshua Chin MD  •  metoprolol tartrate (LOPRESSOR) tablet 12.5 mg, 12.5 mg, Oral, Q12H, Joshua Chin MD, 12.5 mg at 09/04/19 0858  •  montelukast (SINGULAIR) tablet 10 mg, 10 mg, Oral, Nightly, Joshua Chin MD, 10 mg at 09/03/19 2026  •  pantoprazole (PROTONIX) EC tablet 40 mg, 40 mg, Oral, Q AM, Joshua Chin MD, 40 mg at 09/04/19 0627  •  [COMPLETED] Insert peripheral IV, , , Once **AND** sodium chloride 0.9 % flush 10 mL, 10 mL, Intravenous, PRN, Arnulfo Urbina MD, 10 mL at 09/04/19 0859     ASSESSMENT  Right lower lobe pneumonia   Degenerative disc disease  Dehydration  Hypertension  Hypothyroidism  Coronary disease  Chronic anemia  Gastroesophageal reflux disease    PLAN  CPM  IV antibiotics  Pain management  Continue home medications  Stress ulcer DVT prophylaxis  Supportive care  Discussed with family  DNR and no feeding tube  PT OT  Follow closely further recommendation according to hospital course    JOSHUA CHIN MD

## 2019-09-05 LAB
ANION GAP SERPL CALCULATED.3IONS-SCNC: 10.4 MMOL/L (ref 5–15)
BASOPHILS # BLD AUTO: 0.05 10*3/MM3 (ref 0–0.2)
BASOPHILS NFR BLD AUTO: 0.6 % (ref 0–1.5)
BUN BLD-MCNC: 18 MG/DL (ref 8–23)
BUN/CREAT SERPL: 20.9 (ref 7–25)
CALCIUM SPEC-SCNC: 9.9 MG/DL (ref 8.2–9.6)
CHLORIDE SERPL-SCNC: 102 MMOL/L (ref 98–107)
CO2 SERPL-SCNC: 23.6 MMOL/L (ref 22–29)
CREAT BLD-MCNC: 0.86 MG/DL (ref 0.57–1)
DEPRECATED RDW RBC AUTO: 49.1 FL (ref 37–54)
EOSINOPHIL # BLD AUTO: 0.3 10*3/MM3 (ref 0–0.4)
EOSINOPHIL NFR BLD AUTO: 3.6 % (ref 0.3–6.2)
ERYTHROCYTE [DISTWIDTH] IN BLOOD BY AUTOMATED COUNT: 13.8 % (ref 12.3–15.4)
GFR SERPL CREATININE-BSD FRML MDRD: 61 ML/MIN/1.73
GLUCOSE BLD-MCNC: 102 MG/DL (ref 65–99)
HCT VFR BLD AUTO: 29.1 % (ref 34–46.6)
HGB BLD-MCNC: 9.7 G/DL (ref 12–15.9)
IMM GRANULOCYTES # BLD AUTO: 0.03 10*3/MM3 (ref 0–0.05)
IMM GRANULOCYTES NFR BLD AUTO: 0.4 % (ref 0–0.5)
LYMPHOCYTES # BLD AUTO: 1.89 10*3/MM3 (ref 0.7–3.1)
LYMPHOCYTES NFR BLD AUTO: 22.8 % (ref 19.6–45.3)
MCH RBC QN AUTO: 32.6 PG (ref 26.6–33)
MCHC RBC AUTO-ENTMCNC: 33.3 G/DL (ref 31.5–35.7)
MCV RBC AUTO: 97.7 FL (ref 79–97)
MONOCYTES # BLD AUTO: 0.94 10*3/MM3 (ref 0.1–0.9)
MONOCYTES NFR BLD AUTO: 11.4 % (ref 5–12)
NEUTROPHILS # BLD AUTO: 5.07 10*3/MM3 (ref 1.7–7)
NEUTROPHILS NFR BLD AUTO: 61.2 % (ref 42.7–76)
NRBC BLD AUTO-RTO: 0 /100 WBC (ref 0–0.2)
NT-PROBNP SERPL-MCNC: 5817 PG/ML (ref 5–1800)
PLATELET # BLD AUTO: 186 10*3/MM3 (ref 140–450)
PMV BLD AUTO: 10.2 FL (ref 6–12)
POTASSIUM BLD-SCNC: 3.6 MMOL/L (ref 3.5–5.2)
RBC # BLD AUTO: 2.98 10*6/MM3 (ref 3.77–5.28)
SODIUM BLD-SCNC: 136 MMOL/L (ref 136–145)
WBC NRBC COR # BLD: 8.28 10*3/MM3 (ref 3.4–10.8)

## 2019-09-05 PROCEDURE — 97110 THERAPEUTIC EXERCISES: CPT

## 2019-09-05 PROCEDURE — 80048 BASIC METABOLIC PNL TOTAL CA: CPT | Performed by: HOSPITALIST

## 2019-09-05 PROCEDURE — 83880 ASSAY OF NATRIURETIC PEPTIDE: CPT | Performed by: HOSPITALIST

## 2019-09-05 PROCEDURE — 94799 UNLISTED PULMONARY SVC/PX: CPT

## 2019-09-05 PROCEDURE — 25010000002 FUROSEMIDE PER 20 MG: Performed by: HOSPITALIST

## 2019-09-05 PROCEDURE — 85025 COMPLETE CBC W/AUTO DIFF WBC: CPT | Performed by: HOSPITALIST

## 2019-09-05 PROCEDURE — 25010000002 LEVOFLOXACIN PER 250 MG: Performed by: HOSPITALIST

## 2019-09-05 RX ORDER — ACETAMINOPHEN 325 MG/1
650 TABLET ORAL EVERY 4 HOURS PRN
Status: DISCONTINUED | OUTPATIENT
Start: 2019-09-05 | End: 2019-09-06 | Stop reason: HOSPADM

## 2019-09-05 RX ORDER — ACETAMINOPHEN 160 MG/5ML
650 SOLUTION ORAL EVERY 4 HOURS PRN
Status: DISCONTINUED | OUTPATIENT
Start: 2019-09-05 | End: 2019-09-05

## 2019-09-05 RX ORDER — FUROSEMIDE 10 MG/ML
40 INJECTION INTRAMUSCULAR; INTRAVENOUS ONCE
Status: COMPLETED | OUTPATIENT
Start: 2019-09-05 | End: 2019-09-05

## 2019-09-05 RX ADMIN — BACLOFEN 5 MG: 10 TABLET ORAL at 14:35

## 2019-09-05 RX ADMIN — Medication 1 CAPSULE: at 08:36

## 2019-09-05 RX ADMIN — IPRATROPIUM BROMIDE AND ALBUTEROL SULFATE 3 ML: 2.5; .5 SOLUTION RESPIRATORY (INHALATION) at 15:22

## 2019-09-05 RX ADMIN — IPRATROPIUM BROMIDE AND ALBUTEROL SULFATE 3 ML: 2.5; .5 SOLUTION RESPIRATORY (INHALATION) at 08:33

## 2019-09-05 RX ADMIN — FERROUS SULFATE TAB 325 MG (65 MG ELEMENTAL FE) 325 MG: 325 (65 FE) TAB at 08:36

## 2019-09-05 RX ADMIN — FERROUS SULFATE TAB 325 MG (65 MG ELEMENTAL FE) 325 MG: 325 (65 FE) TAB at 20:04

## 2019-09-05 RX ADMIN — PANTOPRAZOLE SODIUM 40 MG: 40 TABLET, DELAYED RELEASE ORAL at 06:57

## 2019-09-05 RX ADMIN — GUAIFENESIN 600 MG: 600 TABLET, EXTENDED RELEASE ORAL at 16:58

## 2019-09-05 RX ADMIN — AMLODIPINE BESYLATE 10 MG: 10 TABLET ORAL at 16:58

## 2019-09-05 RX ADMIN — LEVOFLOXACIN 750 MG: 5 INJECTION, SOLUTION INTRAVENOUS at 20:04

## 2019-09-05 RX ADMIN — LOSARTAN POTASSIUM 50 MG: 50 TABLET, FILM COATED ORAL at 08:35

## 2019-09-05 RX ADMIN — ASPIRIN 81 MG: 81 TABLET, CHEWABLE ORAL at 08:36

## 2019-09-05 RX ADMIN — BACLOFEN 5 MG: 10 TABLET ORAL at 01:52

## 2019-09-05 RX ADMIN — Medication 400 MG: at 08:36

## 2019-09-05 RX ADMIN — LEVOTHYROXINE SODIUM 75 MCG: 75 TABLET ORAL at 06:57

## 2019-09-05 RX ADMIN — FUROSEMIDE 40 MG: 10 INJECTION, SOLUTION INTRAMUSCULAR; INTRAVENOUS at 16:58

## 2019-09-05 RX ADMIN — METOPROLOL TARTRATE 12.5 MG: 25 TABLET ORAL at 20:04

## 2019-09-05 RX ADMIN — ACETAMINOPHEN 650 MG: 325 TABLET, FILM COATED ORAL at 14:35

## 2019-09-05 RX ADMIN — VITAMIN D, TAB 1000IU (100/BT) 1000 UNITS: 25 TAB at 16:58

## 2019-09-05 RX ADMIN — MONTELUKAST SODIUM 10 MG: 10 TABLET, FILM COATED ORAL at 20:04

## 2019-09-05 RX ADMIN — IPRATROPIUM BROMIDE AND ALBUTEROL SULFATE 3 ML: 2.5; .5 SOLUTION RESPIRATORY (INHALATION) at 11:12

## 2019-09-05 RX ADMIN — ISOSORBIDE MONONITRATE 30 MG: 30 TABLET ORAL at 08:36

## 2019-09-05 RX ADMIN — METOPROLOL TARTRATE 12.5 MG: 25 TABLET ORAL at 08:35

## 2019-09-05 RX ADMIN — IPRATROPIUM BROMIDE AND ALBUTEROL SULFATE 3 ML: 2.5; .5 SOLUTION RESPIRATORY (INHALATION) at 19:38

## 2019-09-05 RX ADMIN — CLOPIDOGREL 75 MG: 75 TABLET, FILM COATED ORAL at 08:36

## 2019-09-05 RX ADMIN — BACLOFEN 5 MG: 10 TABLET ORAL at 08:36

## 2019-09-05 NOTE — PLAN OF CARE
Problem: Patient Care Overview  Goal: Plan of Care Review  Outcome: Ongoing (interventions implemented as appropriate)   09/05/19 3908   Coping/Psychosocial   Plan of Care Reviewed With patient;family   Plan of Care Review   Progress improving   OTHER   Outcome Summary VSS. Patient c/o neck pain. Baclofen given; positive results. Family at BS. Up to BR with assist.        Problem: Activity Intolerance (Adult)  Goal: Activity Tolerance  Outcome: Ongoing (interventions implemented as appropriate)      Problem: Fall Risk (Adult)  Goal: Absence of Fall  Outcome: Ongoing (interventions implemented as appropriate)

## 2019-09-05 NOTE — PROGRESS NOTES
"Daily progress note    Chief complaint  Doing same  No new complaints  Family at bedside    History of present illness  89-year-old white female with history of coronary artery disease hypertension hypothyroidism and TIA in the past who lives by herself brought to the emergency room with fever chills cough and generalized weakness.  Patient stated that he started with abdominal pain about a week ago and treated for UTI with Macrobid then developed nausea fatigue cough and fever came yesterday.  Patient work-up in ER revealed right lower lobe pneumonia admit for management.  Patient fully alert oriented daughter at bedside and feeling better and no new complaints.  Patient denies any vomiting but does have nausea and thinks she choke with eating that cause cough.  Patient does not want any feeding tube hemodialysis CPR or intubation but she admits to get the swallow eval and get treatment for possible aspiration pneumonia.     REVIEW OF SYSTEMS  Remarkable for neck pain     PHYSICAL EXAM  Blood pressure 148/74, pulse 75, temperature 97.3 °F (36.3 °C), temperature source Oral, resp. rate 18, height 165.1 cm (65\"), weight 45.1 kg (99 lb 6.8 oz), SpO2 96 %, unknown if currently breastfeeding.    Constitutional: She is oriented to person, place, and time. No distress.   Head: Normocephalic and atraumatic.   Mouth/Throat: Mucous membranes are dry.   Eyes: EOM are normal. Pupils are equal, round, and reactive to light.   Neck: Normal range of motion. Neck supple.   Cardiovascular: Normal rate, regular rhythm and normal heart sounds.   Pulmonary/Chest: Effort normal and breath sounds normal. No respiratory distress.   Abdominal: Soft. There is tenderness (LLQ). There is no rebound and no guarding.   Musculoskeletal: Normal range of motion. She exhibits no edema.   Neurological: She is alert and oriented to person, place, and time. She has normal sensation and normal strength.   Skin: Skin is warm and dry. No rash noted. "   Psychiatric: Mood and affect normal.     LAB RESULTS  Lab Results (last 24 hours)     Procedure Component Value Units Date/Time    Basic Metabolic Panel [[id number]]  (Abnormal) Collected:  09/05/19 0454    Specimen:  Blood Updated:  09/05/19 0558     Glucose 102 mg/dL      BUN 18 mg/dL      Creatinine 0.86 mg/dL      Sodium 136 mmol/L      Potassium 3.6 mmol/L      Chloride 102 mmol/L      CO2 23.6 mmol/L      Calcium 9.9 mg/dL      eGFR Non African Amer 61 mL/min/1.73      BUN/Creatinine Ratio 20.9     Anion Gap 10.4 mmol/L     Narrative:       GFR Normal >60  Chronic Kidney Disease <60  Kidney Failure <15    BNP [[id number]]  (Abnormal) Collected:  09/05/19 0454    Specimen:  Blood Updated:  09/05/19 0557     proBNP 5,817.0 pg/mL     Narrative:       Among patients with dyspnea, NT-proBNP is highly sensitive for the detection of acute congestive heart failure. In addition NT-proBNP of <300 pg/ml effectively rules out acute congestive heart failure with 99% negative predictive value.    CBC & Differential [[id number]] Collected:  09/05/19 0454    Specimen:  Blood Updated:  09/05/19 0544    Narrative:       The following orders were created for panel order CBC & Differential.  Procedure                               Abnormality         Status                     ---------                               -----------         ------                     CBC Auto Differential[[id number]]        Abnormal            Final result                 Please view results for these tests on the individual orders.    CBC Auto Differential [[id number]]  (Abnormal) Collected:  09/05/19 0454    Specimen:  Blood Updated:  09/05/19 0544     WBC 8.28 10*3/mm3      RBC 2.98 10*6/mm3      Hemoglobin 9.7 g/dL      Hematocrit 29.1 %      MCV 97.7 fL      MCH 32.6 pg      MCHC 33.3 g/dL      RDW 13.8 %      RDW-SD 49.1 fl      MPV 10.2 fL      Platelets 186 10*3/mm3      Neutrophil % 61.2 %      Lymphocyte % 22.8 %      Monocyte % 11.4 %       Eosinophil % 3.6 %      Basophil % 0.6 %      Immature Grans % 0.4 %      Neutrophils, Absolute 5.07 10*3/mm3      Lymphocytes, Absolute 1.89 10*3/mm3      Monocytes, Absolute 0.94 10*3/mm3      Eosinophils, Absolute 0.30 10*3/mm3      Basophils, Absolute 0.05 10*3/mm3      Immature Grans, Absolute 0.03 10*3/mm3      nRBC 0.0 /100 WBC     Blood Culture - Blood, Arm, Left [[id number]] Collected:  09/01/19 1919    Specimen:  Blood from Arm, Left Updated:  09/04/19 1930     Blood Culture No growth at 3 days    Blood Culture - Blood, Arm, Left [[id number]] Collected:  09/01/19 1559    Specimen:  Blood from Arm, Left Updated:  09/04/19 1615     Blood Culture No growth at 3 days        Imaging Results (last 24 hours)     ** No results found for the last 24 hours. **            Current Facility-Administered Medications:   •  acetaminophen (TYLENOL) tablet 650 mg, 650 mg, Oral, Q4H PRN, Leon Chin MD, 650 mg at 09/05/19 1435  •  amLODIPine (NORVASC) tablet 10 mg, 10 mg, Oral, Q PM, Leon Chin MD, 10 mg at 09/04/19 1635  •  aspirin chewable tablet 81 mg, 81 mg, Oral, Daily, Leon Chin MD, 81 mg at 09/05/19 0836  •  baclofen (LIORESAL) tablet 5 mg, 5 mg, Oral, 4x Daily PRN, Leon Chin MD, 5 mg at 09/05/19 1435  •  cholecalciferol (VITAMIN D3) tablet 1,000 Units, 1,000 Units, Oral, Q PM, Leon Chin MD, 1,000 Units at 09/04/19 1635  •  clopidogrel (PLAVIX) tablet 75 mg, 75 mg, Oral, Daily, Leon Chin MD, 75 mg at 09/05/19 0836  •  ferrous sulfate tablet 325 mg, 325 mg, Oral, BID, Leon Chin MD, 325 mg at 09/05/19 0836  •  guaiFENesin (MUCINEX) 12 hr tablet 600 mg, 600 mg, Oral, Q PM, Leon Chin MD, 600 mg at 09/04/19 1635  •  HYDROcodone-acetaminophen (NORCO) 5-325 MG per tablet 1 tablet, 1 tablet, Oral, Q6H PRN, Leon Chin MD  •  ipratropium-albuterol (DUO-NEB) nebulizer solution 3 mL, 3 mL, Nebulization, Q4H PRN, Leon Chin MD  •  ipratropium-albuterol (DUO-NEB) nebulizer solution 3 mL, 3 mL,  Nebulization, Q4H - RT, Joshua Chin MD, 3 mL at 09/05/19 1112  •  isosorbide mononitrate (IMDUR) 24 hr tablet 30 mg, 30 mg, Oral, Daily, Joshua Chin MD, 30 mg at 09/05/19 0836  •  lactobacillus acidophilus (RISAQUAD) capsule 1 capsule, 1 capsule, Oral, Daily, Joshua Chin MD, 1 capsule at 09/05/19 0836  •  levoFLOXacin (LEVAQUIN) 750 mg/150 mL D5W (premix) (LEVAQUIN) 750 mg, 750 mg, Intravenous, Q48H, Joshua Chin MD, 750 mg at 09/03/19 2026  •  levothyroxine (SYNTHROID, LEVOTHROID) tablet 75 mcg, 75 mcg, Oral, Q AM, Joshua Chin MD, 75 mcg at 09/05/19 0657  •  losartan (COZAAR) tablet 50 mg, 50 mg, Oral, Q24H, Joshua Chin MD, 50 mg at 09/05/19 0835  •  magnesium oxide (MAG-OX) tablet 400 mg, 400 mg, Oral, Daily, Joshua Chin MD, 400 mg at 09/05/19 0836  •  melatonin tablet 5 mg, 5 mg, Oral, Nightly PRN, Joshua Chin MD  •  metoprolol tartrate (LOPRESSOR) tablet 12.5 mg, 12.5 mg, Oral, Q12H, Joshua Chin MD, 12.5 mg at 09/05/19 0835  •  montelukast (SINGULAIR) tablet 10 mg, 10 mg, Oral, Nightly, Joshua Chin MD, 10 mg at 09/04/19 2036  •  pantoprazole (PROTONIX) EC tablet 40 mg, 40 mg, Oral, Q AM, Joshua Chin MD, 40 mg at 09/05/19 0657  •  [COMPLETED] Insert peripheral IV, , , Once **AND** sodium chloride 0.9 % flush 10 mL, 10 mL, Intravenous, PRN, Arnulfo Urbina MD, 10 mL at 09/04/19 0859     ASSESSMENT  Right lower lobe pneumonia   Degenerative disc disease  Dehydration  Hypertension  Hypothyroidism  Coronary disease  Chronic anemia  Gastroesophageal reflux disease    PLAN  CPM  IV antibiotics  Pain management  Continue home medications  Stress ulcer DVT prophylaxis  Supportive care  Discussed with family  DNR and no feeding tube  PT OT  Discharge planning    JOSHUA CHIN MD

## 2019-09-05 NOTE — THERAPY TREATMENT NOTE
Acute Care - Physical Therapy Treatment Note  [facility]     Patient Name: Dinah Mcnamara  : 1922  MRN: [id number]  Today's Date: 2019             Admit Date: 2019    Visit Dx:    ICD-10-CM ICD-9-CM   1. Pneumonia of right lower lobe due to infectious organism (CMS/HCC) J18.1 486     Patient Active Problem List   Diagnosis   • Acute gallstone pancreatitis   • SIRS (systemic inflammatory response syndrome) (CMS/HCC)   • Colitis   • Hypokalemia   • Dehydration   • CAD (coronary artery disease)   • HTN (hypertension)   • Essential hypertension   • Pneumonia of right lower lobe due to infectious organism (CMS/HCC)       Therapy Treatment    Rehabilitation Treatment Summary     Row Name 19 1625             Treatment Time/Intention    Discipline  physical therapy assistant  -      Document Type  therapy note (daily note)  -      Subjective Information  complains of;weakness;fatigue;pain  -      Care Plan Review  patient/other agree to care plan  -      Care Plan Review, Other Participant(s)  daughter  -      Comment  neck/shoulder pain limiting adrien to mobility and amb dist this visit  -      Existing Precautions/Restrictions  fall  -      Treatment Considerations/Comments  had RN pre-med pt for PT session  -      Recorded by [] Sherry Briceno, JAGJIT 19 1634      Row Name 19 1625             Bed Mobility Assessment/Treatment    Supine-Sit [place] (Bed Mobility)  2 person assist;minimum assist (75% patient effort);moderate assist (50% patient effort)  -      Bed Mobility, Safety Issues  decreased use of arms for pushing/pulling  -      Comment (Bed Mobility)  neck /shoulder pain limiting  -      Recorded by [] Sherry Briceno PTA 19 1634      Row Name 19 1625             Sit-Stand Transfer    Sit-Stand [place] (Transfers)  2 person assist;minimum assist (75% patient effort)  -      Assistive Device (Sit-Stand Transfers)  walker,  front-wheeled  -JM      Recorded by [] Sherry Briceno, [facility] 09/05/19 1634      Row Name 09/05/19 1625             Toilet Transfer    Type (Toilet Transfer)  stand-sit;sit-stand  -      [place] Level (Toilet Transfer)  2 person assist;minimum assist (75% patient effort);moderate assist (50% patient effort)  -      Assistive Device (Toilet Transfer)  commode;grab bars/safety frame;walker, front-wheeled  -JM      Recorded by [] Sherry Briceno, [facility] 09/05/19 1634      Row Name 09/05/19 1625             Gait/Stairs Assessment/Training    [place] Level (Gait)  2 person assist;minimum assist (75% patient effort)  -      Assistive Device (Gait)  walker, front-wheeled  -      Distance in Feet (Gait)  20  -JM      Deviations/Abnormal Patterns (Gait)  antalgic;freddy decreased;festinating/shuffling  -      Bilateral Gait Deviations  forward flexed posture  -      Comment (Gait/Stairs)  pt unable to incr amb dist due to pain, finally agreed to try amb after much encouragement  -      Recorded by [] Sherry Briceno, [facility] 09/05/19 1634      Row Name 09/05/19 1625             Positioning and Restraints    Pre-Treatment Position  sitting in chair/recliner  -      In Chair  reclined;call light within reach;encouraged to call for assist;exit alarm on;with family/caregiver  -      Recorded by [] Sherry Briceno, [facility] 09/05/19 1634      Row Name 09/05/19 1625             Pain Scale: Numbers Pre/Post-Treatment    Pain Scale: Numbers, Pretreatment  8/10  -      Pain Scale: Numbers, Post-Treatment  9/10  -      Pain Location  neck and shoulders  -      Pain Intervention(s)  Medication (See MAR);Repositioned;Heat applied;Rest  -      Recorded by [] Sherry Briceno, [facility] 09/05/19 1634        User Key  (r) = Recorded By, (t) = Taken By, (c) = Cosigned By    Initials Name Effective Dates Discipline    Sherry Brasher, [facility] 03/07/18 -  PT                   Physical Therapy Education      "Title: PT OT SLP Therapies (In Progress)     Topic: Physical Therapy (In Progress)     Point: Mobility training (In Progress)     Learning Progress Summary           Patient Acceptance, E,TB,D, NR by  at 9/5/2019  4:36 PM    Acceptance, E,D, VU,NR by MS at 9/4/2019  4:48 PM    Acceptance, E, NR by DB at 9/3/2019  3:31 PM   Family Acceptance, E,TB,D, NR by  at 9/5/2019  4:36 PM                   Point: Body mechanics (In Progress)     Learning Progress Summary           Patient Acceptance, E,TB,D, NR by  at 9/5/2019  4:36 PM    Acceptance, E,D, VU,NR by MS at 9/4/2019  4:48 PM    Acceptance, E, NR by DB at 9/3/2019  3:31 PM   Family Acceptance, E,TB,D, NR by BEVERLY at 9/5/2019  4:36 PM                   Point: Precautions (In Progress)     Learning Progress Summary           Patient Acceptance, E,TB,D, NR by  at 9/5/2019  4:36 PM    Acceptance, E,D, VU,NR by MS at 9/4/2019  4:48 PM    Acceptance, E, NR by DB at 9/3/2019  3:31 PM   Family Acceptance, E,TB,D, NR by BEVERLY at 9/5/2019  4:36 PM                               User Key     Initials Effective Dates Name Provider Type Discipline     03/07/18 -  Sherry Briceno, PTA Physical Therapy Assistant PT    MS 04/03/18 -  Gallito Swift, PT Physical Therapist PT    DB 08/19/19 -  Erlin Dempsey PT Student PT Student PT                PT Recommendation and Plan     Plan of Care Reviewed With: patient, daughter  Outcome Summary: pt incr activity but normally assist of 1 or less at home per dtr; dtr wanting pt to incr amb dist today, but pt could not saying \"the pain is taking my breath away\"; pre-meds for PT and pt still too painful  Outcome Measures     Row Name 09/05/19 1600 09/03/19 9961          How much help from another person do you currently need...    Turning from your back to your side while in flat bed without using bedrails?  2  -  --     Moving from lying on back to sitting on the side of a flat bed without bedrails?  2  -JM  --     Moving to and " from a bed to a chair (including a wheelchair)?  3  -JM  --     Standing up from a chair using your arms (e.g., wheelchair, bedside chair)?  2  -JM  --     Climbing 3-5 steps with a railing?  1  -JM  --     To walk in hospital room?  3  -JM  --     AM-PAC 6 Clicks Score (PT)  13  -JM  --        How much help from another is currently needed...    Putting on and taking off regular lower body clothing?  --  2  -SG     Bathing (including washing, rinsing, and drying)  --  2  -SG     Toileting (which includes using toilet bed pan or urinal)  --  3  -SG     Putting on and taking off regular upper body clothing  --  3  -SG     Taking care of personal grooming (such as brushing teeth)  --  3  -SG     Eating meals  --  3  -SG     AM-PAC 6 Clicks Score (OT)  --  16  -SG        Functional Assessment    Outcome Measure Options  --  AM-PAC 6 Clicks Daily Activity (OT)  -       User Key  (r) = Recorded By, (t) = Taken By, (c) = Cosigned By    Initials Name Provider Type     Latonia Harris OTR Occupational Therapist    Sherry Brasher PTA Physical Therapy Assistant         Time Calculation:   PT Charges     Row Name 09/05/19 1637             Time Calculation    Start Time  1539  -      Stop Time  1614  -      Time Calculation (min)  35 min  -      PT Received On  09/05/19  -      PT - Next Appointment  09/06/19  -        User Key  (r) = Recorded By, (t) = Taken By, (c) = Cosigned By    Initials Name Provider Type    Sherry Brasher PTA Physical Therapy Assistant        Therapy Charges for Today     Code Description Service Date Service Provider Modifiers Qty    52471871104 HC PT THER PROC EA 15 MIN 9/5/2019 Sherry Briceno PTA GP 2    70440591625 HC PT THER SUPP EA 15 MIN 9/5/2019 Sherry Briceno PTA GP 2          PT G-Codes  Outcome Measure Options: AM-PAC 6 Clicks Basic Mobility (PT)  AM-PAC 6 Clicks Score (PT): 13  AM-PAC 6 Clicks Score (OT): 16    Sherry Brcieno PTA  9/5/2019

## 2019-09-06 ENCOUNTER — APPOINTMENT (OUTPATIENT)
Dept: GENERAL RADIOLOGY | Facility: HOSPITAL | Age: 84
End: 2019-09-06

## 2019-09-06 VITALS
RESPIRATION RATE: 20 BRPM | DIASTOLIC BLOOD PRESSURE: 64 MMHG | BODY MASS INDEX: 16.57 KG/M2 | TEMPERATURE: 97.1 F | HEART RATE: 76 BPM | WEIGHT: 99.43 LBS | HEIGHT: 65 IN | OXYGEN SATURATION: 96 % | SYSTOLIC BLOOD PRESSURE: 132 MMHG

## 2019-09-06 LAB
ANION GAP SERPL CALCULATED.3IONS-SCNC: 11.7 MMOL/L (ref 5–15)
BACTERIA SPEC AEROBE CULT: NORMAL
BACTERIA SPEC AEROBE CULT: NORMAL
BUN BLD-MCNC: 22 MG/DL (ref 8–23)
BUN/CREAT SERPL: 20.6 (ref 7–25)
CALCIUM SPEC-SCNC: 9.9 MG/DL (ref 8.2–9.6)
CHLORIDE SERPL-SCNC: 100 MMOL/L (ref 98–107)
CO2 SERPL-SCNC: 23.3 MMOL/L (ref 22–29)
CREAT BLD-MCNC: 1.07 MG/DL (ref 0.57–1)
GFR SERPL CREATININE-BSD FRML MDRD: 47 ML/MIN/1.73
GLUCOSE BLD-MCNC: 108 MG/DL (ref 65–99)
NT-PROBNP SERPL-MCNC: 3946 PG/ML (ref 5–1800)
POTASSIUM BLD-SCNC: 3.6 MMOL/L (ref 3.5–5.2)
SODIUM BLD-SCNC: 135 MMOL/L (ref 136–145)

## 2019-09-06 PROCEDURE — 83880 ASSAY OF NATRIURETIC PEPTIDE: CPT | Performed by: HOSPITALIST

## 2019-09-06 PROCEDURE — 94799 UNLISTED PULMONARY SVC/PX: CPT

## 2019-09-06 PROCEDURE — 72050 X-RAY EXAM NECK SPINE 4/5VWS: CPT

## 2019-09-06 PROCEDURE — 80048 BASIC METABOLIC PNL TOTAL CA: CPT | Performed by: HOSPITALIST

## 2019-09-06 RX ORDER — LEVOFLOXACIN 250 MG/1
250 TABLET ORAL DAILY
Status: DISCONTINUED | OUTPATIENT
Start: 2019-09-06 | End: 2019-09-06 | Stop reason: HOSPADM

## 2019-09-06 RX ORDER — HYDROCODONE BITARTRATE AND ACETAMINOPHEN 5; 325 MG/1; MG/1
1 TABLET ORAL EVERY 6 HOURS PRN
Status: DISCONTINUED | OUTPATIENT
Start: 2019-09-06 | End: 2019-09-06 | Stop reason: HOSPADM

## 2019-09-06 RX ORDER — BACLOFEN 5 MG/1
5 TABLET ORAL 4 TIMES DAILY PRN
Qty: 10 TABLET | Refills: 0 | Status: SHIPPED | OUTPATIENT
Start: 2019-09-06 | End: 2019-09-13

## 2019-09-06 RX ORDER — ISOSORBIDE MONONITRATE 30 MG/1
30 TABLET, EXTENDED RELEASE ORAL DAILY
Qty: 30 TABLET | Refills: 0 | Status: SHIPPED | OUTPATIENT
Start: 2019-09-07 | End: 2019-10-07

## 2019-09-06 RX ORDER — LEVOFLOXACIN 250 MG/1
250 TABLET ORAL DAILY
Qty: 3 TABLET | Refills: 0 | Status: SHIPPED | OUTPATIENT
Start: 2019-09-06 | End: 2019-09-09

## 2019-09-06 RX ADMIN — PANTOPRAZOLE SODIUM 40 MG: 40 TABLET, DELAYED RELEASE ORAL at 05:41

## 2019-09-06 RX ADMIN — METOPROLOL TARTRATE 12.5 MG: 25 TABLET ORAL at 08:44

## 2019-09-06 RX ADMIN — BACLOFEN 5 MG: 10 TABLET ORAL at 13:26

## 2019-09-06 RX ADMIN — HYDROCODONE BITARTRATE AND ACETAMINOPHEN 1 TABLET: 5; 325 TABLET ORAL at 14:49

## 2019-09-06 RX ADMIN — Medication 400 MG: at 08:44

## 2019-09-06 RX ADMIN — LEVOTHYROXINE SODIUM 75 MCG: 75 TABLET ORAL at 05:41

## 2019-09-06 RX ADMIN — Medication 1 CAPSULE: at 08:44

## 2019-09-06 RX ADMIN — IPRATROPIUM BROMIDE AND ALBUTEROL SULFATE 3 ML: 2.5; .5 SOLUTION RESPIRATORY (INHALATION) at 10:08

## 2019-09-06 RX ADMIN — ASPIRIN 81 MG: 81 TABLET, CHEWABLE ORAL at 08:44

## 2019-09-06 RX ADMIN — FERROUS SULFATE TAB 325 MG (65 MG ELEMENTAL FE) 325 MG: 325 (65 FE) TAB at 08:44

## 2019-09-06 RX ADMIN — ISOSORBIDE MONONITRATE 30 MG: 30 TABLET ORAL at 08:44

## 2019-09-06 RX ADMIN — CLOPIDOGREL 75 MG: 75 TABLET, FILM COATED ORAL at 08:44

## 2019-09-06 RX ADMIN — IPRATROPIUM BROMIDE AND ALBUTEROL SULFATE 3 ML: 2.5; .5 SOLUTION RESPIRATORY (INHALATION) at 12:51

## 2019-09-06 RX ADMIN — LOSARTAN POTASSIUM 50 MG: 50 TABLET, FILM COATED ORAL at 08:44

## 2019-09-06 RX ADMIN — HYDROCODONE BITARTRATE AND ACETAMINOPHEN 1 TABLET: 5; 325 TABLET ORAL at 03:05

## 2019-09-06 RX ADMIN — BACLOFEN 5 MG: 10 TABLET ORAL at 00:27

## 2019-09-06 RX ADMIN — BACLOFEN 5 MG: 10 TABLET ORAL at 05:54

## 2019-09-06 NOTE — PLAN OF CARE
Problem: Nutrition, Imbalanced: Inadequate Oral Intake (Adult)  Intervention: Promote/Optimize Nutrition   09/06/19 1116   Nutrition Interventions   Oral Nutrition Promotion calorie dense foods provided;calorie dense liquids provided. Encourage Boost plus supplement between meals.

## 2019-09-06 NOTE — DISCHARGE SUMMARY
Discharge summary    Date of admission 9/1/2019  Date of discharge 9/6/2019    Final diagnosis  Right lower lobe pneumonia   Degenerative disc disease  Dehydration  Hypertension  Hypothyroidism  Coronary disease  Chronic anemia  Gastroesophageal reflux disease    Discharge medications    Current Facility-Administered Medications:   •  acetaminophen (TYLENOL) tablet 650 mg, 650 mg, Oral, Q4H PRN, Leon Chin MD, 650 mg at 09/05/19 1435  •  amLODIPine (NORVASC) tablet 10 mg, 10 mg, Oral, Q PM, Leon Chin MD, 10 mg at 09/05/19 1658  •  aspirin chewable tablet 81 mg, 81 mg, Oral, Daily, Leon Chin MD, 81 mg at 09/06/19 0844  •  baclofen (LIORESAL) tablet 5 mg, 5 mg, Oral, 4x Daily PRN, Leon Chin MD, 5 mg at 09/06/19 1326  •  cholecalciferol (VITAMIN D3) tablet 1,000 Units, 1,000 Units, Oral, Q PM, Leon Chin MD, 1,000 Units at 09/05/19 1658  •  clopidogrel (PLAVIX) tablet 75 mg, 75 mg, Oral, Daily, Leon Chin MD, 75 mg at 09/06/19 0844  •  ferrous sulfate tablet 325 mg, 325 mg, Oral, BID, Leon Chin MD, 325 mg at 09/06/19 0844  •  guaiFENesin (MUCINEX) 12 hr tablet 600 mg, 600 mg, Oral, Q PM, Leon Chin MD, 600 mg at 09/05/19 1658  •  HYDROcodone-acetaminophen (NORCO) 5-325 MG per tablet 1 tablet, 1 tablet, Oral, Q6H PRN, Leon Chin MD, 1 tablet at 09/06/19 0305  •  ipratropium-albuterol (DUO-NEB) nebulizer solution 3 mL, 3 mL, Nebulization, Q4H PRN, Leon Chin MD  •  ipratropium-albuterol (DUO-NEB) nebulizer solution 3 mL, 3 mL, Nebulization, Q4H - RT, Leon Chin MD, 3 mL at 09/06/19 1251  •  isosorbide mononitrate (IMDUR) 24 hr tablet 30 mg, 30 mg, Oral, Daily, Leon Chin MD, 30 mg at 09/06/19 0844  •  lactobacillus acidophilus (RISAQUAD) capsule 1 capsule, 1 capsule, Oral, Daily, Leon Chin MD, 1 capsule at 09/06/19 0844  •  levoFLOXacin (LEVAQUIN) tablet 250 mg, 250 mg, Oral, Daily, Leon Chin MD  •  levothyroxine (SYNTHROID, LEVOTHROID) tablet 75 mcg, 75 mcg, Oral, Q AM,  Leon Chin MD, 75 mcg at 09/06/19 0541  •  losartan (COZAAR) tablet 50 mg, 50 mg, Oral, Q24H, Leon Chin MD, 50 mg at 09/06/19 0844  •  magnesium oxide (MAG-OX) tablet 400 mg, 400 mg, Oral, Daily, Leon Chin MD, 400 mg at 09/06/19 0844  •  melatonin tablet 5 mg, 5 mg, Oral, Nightly PRN, Leon Chin MD  •  metoprolol tartrate (LOPRESSOR) tablet 12.5 mg, 12.5 mg, Oral, Q12H, Leon Chin MD, 12.5 mg at 09/06/19 0844  •  montelukast (SINGULAIR) tablet 10 mg, 10 mg, Oral, Nightly, Leon Chin MD, 10 mg at 09/05/19 2004  •  pantoprazole (PROTONIX) EC tablet 40 mg, 40 mg, Oral, Q AM, Leon Chin MD, 40 mg at 09/06/19 0541  •  [COMPLETED] Insert peripheral IV, , , Once **AND** sodium chloride 0.9 % flush 10 mL, 10 mL, Intravenous, PRN, Arnulfo Urbina MD, 10 mL at 09/04/19 0859     Consults obtained  Nutrition  PT OT    Procedures  None    Hospital course  89 white female who is well-known to our service with history of hypertension coronary artery disease hypothyroidism and TIA in the past who lives by herself admit to emergency room with cough fever chills and generalized weakness.  Patient work-up revealed right lower lobe pneumonia admit for management.  Patient admitted treated with antibiotics and supplemental oxygen nebulizer and supportive care.  Patient was also dehydrated received fluids.  Patient cultures remains negative and her symptoms improved but she developed severe and neck pain which is further evaluated showed severe degenerative disease no evidence of fracture and treated with muscle relaxer and pain medication and pain completely resolved.  Patient is very weak and will be discharged to subacute rehab for 3 more days of antibiotics.  Patient is tolerating regular diet and fully alert oriented with white count normal and all the electrolytes within normal limits.  Patient remained DNR throughout hospital course    Discharge diet regular    Activity per PT OT    Medication as  above    Further care per accepting physician at subacute rehab    JOSHUA MELENDEZ MD

## 2019-09-06 NOTE — PROGRESS NOTES
"Daily progress note    Chief complaint  Doing better  No new complaints  Family at bedside    History of present illness  89-year-old white female with history of coronary artery disease hypertension hypothyroidism and TIA in the past who lives by herself brought to the emergency room with fever chills cough and generalized weakness.  Patient stated that he started with abdominal pain about a week ago and treated for UTI with Macrobid then developed nausea fatigue cough and fever came yesterday.  Patient work-up in ER revealed right lower lobe pneumonia admit for management.  Patient fully alert oriented daughter at bedside and feeling better and no new complaints.  Patient denies any vomiting but does have nausea and thinks she choke with eating that cause cough.  Patient does not want any feeding tube hemodialysis CPR or intubation but she admits to get the swallow eval and get treatment for possible aspiration pneumonia.     REVIEW OF SYSTEMS  Remarkable for neck pain     PHYSICAL EXAM  Blood pressure 132/64, pulse 76, temperature 97.1 °F (36.2 °C), temperature source Oral, resp. rate 20, height 165.1 cm (65\"), weight 45.1 kg (99 lb 6.8 oz), SpO2 96 %, unknown if currently breastfeeding.    Constitutional: She is oriented to person, place, and time. No distress.   Head: Normocephalic and atraumatic.   Mouth/Throat: Mucous membranes are dry.   Eyes: EOM are normal. Pupils are equal, round, and reactive to light.   Neck: Normal range of motion. Neck supple.   Cardiovascular: Normal rate, regular rhythm and normal heart sounds.   Pulmonary/Chest: Effort normal and breath sounds normal. No respiratory distress.   Abdominal: Soft. There is tenderness (LLQ). There is no rebound and no guarding.   Musculoskeletal: Normal range of motion. She exhibits no edema.   Neurological: She is alert and oriented to person, place, and time. She has normal sensation and normal strength.   Skin: Skin is warm and dry. No rash noted. "   Psychiatric: Mood and affect normal.     LAB RESULTS  Lab Results (last 24 hours)     Procedure Component Value Units Date/Time    BNP [[id number]]  (Abnormal) Collected:  09/06/19 0502    Specimen:  Blood Updated:  09/06/19 0610     proBNP 3,946.0 pg/mL     Narrative:       Among patients with dyspnea, NT-proBNP is highly sensitive for the detection of acute congestive heart failure. In addition NT-proBNP of <300 pg/ml effectively rules out acute congestive heart failure with 99% negative predictive value.    Basic Metabolic Panel [[id number]]  (Abnormal) Collected:  09/06/19 0502    Specimen:  Blood Updated:  09/06/19 0610     Glucose 108 mg/dL      BUN 22 mg/dL      Creatinine 1.07 mg/dL      Sodium 135 mmol/L      Potassium 3.6 mmol/L      Chloride 100 mmol/L      CO2 23.3 mmol/L      Calcium 9.9 mg/dL      eGFR Non African Amer 47 mL/min/1.73      BUN/Creatinine Ratio 20.6     Anion Gap 11.7 mmol/L     Narrative:       GFR Normal >60  Chronic Kidney Disease <60  Kidney Failure <15    Blood Culture - Blood, Arm, Left [[id number]] Collected:  09/01/19 1919    Specimen:  Blood from Arm, Left Updated:  09/05/19 1930     Blood Culture No growth at 4 days    Blood Culture - Blood, Arm, Left [[id number]] Collected:  09/01/19 1559    Specimen:  Blood from Arm, Left Updated:  09/05/19 1616     Blood Culture No growth at 4 days        Imaging Results (last 24 hours)     Procedure Component Value Units Date/Time    XR Spine Cervical Complete 4 or 5 View [[id number]] Collected:  09/06/19 0357     Updated:  09/06/19 0404    Narrative:       FIVE VIEW CERVICAL SPINE     HISTORY: Pain     COMPARISON: None.     FINDINGS: Routine AP, lateral, odontoid, and bilateral oblique images  were obtained.  A total of 7 images were submitted. (Positioning is very  limited in this elderly patient who was unable to fully cooperate with  positioning maneuvers.)     Within these limitations, there is generalized lordotic curvature of  the  cervical spine on the lateral images. The cervical vertebrae are  otherwise well aligned. They are well maintained in height. No acute  fracture or significant compression deformity identified.  There are  multilevel degenerative changes. These are most pronounced at C4-5 and  C5-C6 with disc height loss and spur formation. Degenerative changes at  the remaining levels are relatively minimal by comparison.       The right neural foramina are fairly well seen in profile on the LPO  image. There is some mild osseous foraminal narrowing at C5-6. The left  neural foramina are not well seen on the RPO image but no obvious  high-grade bony foraminal narrowing. The facets are well aligned. Mild  multilevel arthritic changes are present in the cervical facets.   Prevertebral soft tissues are unremarkable.  There are calcifications in  the distribution of the carotid bifurcations bilaterally. There is  generalized osteopenia          Impression:       1.  Suboptimal positioning, there is generalized lordotic curvature with  multilevel degenerative and arthritic changes. No acute fracture is  demonstrated. If further evaluation needed, CT or MRI could better  assess     This report was finalized on 9/6/2019 4:01 AM by Gallito Alanis M.D.               Current Facility-Administered Medications:   •  acetaminophen (TYLENOL) tablet 650 mg, 650 mg, Oral, Q4H PRN, Leon Chin MD, 650 mg at 09/05/19 1435  •  amLODIPine (NORVASC) tablet 10 mg, 10 mg, Oral, Q PM, Leon Chin MD, 10 mg at 09/05/19 1658  •  aspirin chewable tablet 81 mg, 81 mg, Oral, Daily, Leon Chin MD, 81 mg at 09/06/19 0844  •  baclofen (LIORESAL) tablet 5 mg, 5 mg, Oral, 4x Daily PRN, Leon Chin MD, 5 mg at 09/06/19 0554  •  cholecalciferol (VITAMIN D3) tablet 1,000 Units, 1,000 Units, Oral, Q PM, Leon Chin MD, 1,000 Units at 09/05/19 1658  •  clopidogrel (PLAVIX) tablet 75 mg, 75 mg, Oral, Daily, Leon Chin MD, 75 mg at 09/06/19 0844  •   ferrous sulfate tablet 325 mg, 325 mg, Oral, BID, Leon Chin MD, 325 mg at 09/06/19 0844  •  guaiFENesin (MUCINEX) 12 hr tablet 600 mg, 600 mg, Oral, Q PM, Leon Chin MD, 600 mg at 09/05/19 1658  •  HYDROcodone-acetaminophen (NORCO) 5-325 MG per tablet 1 tablet, 1 tablet, Oral, Q6H PRN, Leon Chin MD, 1 tablet at 09/06/19 0305  •  ipratropium-albuterol (DUO-NEB) nebulizer solution 3 mL, 3 mL, Nebulization, Q4H PRN, Leon Chin MD  •  ipratropium-albuterol (DUO-NEB) nebulizer solution 3 mL, 3 mL, Nebulization, Q4H - RT, Leon Chin MD, 3 mL at 09/06/19 1251  •  isosorbide mononitrate (IMDUR) 24 hr tablet 30 mg, 30 mg, Oral, Daily, Leon Chin MD, 30 mg at 09/06/19 0844  •  lactobacillus acidophilus (RISAQUAD) capsule 1 capsule, 1 capsule, Oral, Daily, Leon Chin MD, 1 capsule at 09/06/19 0844  •  levoFLOXacin (LEVAQUIN) 750 mg/150 mL D5W (premix) (LEVAQUIN) 750 mg, 750 mg, Intravenous, Q48H, Leon Chin MD, 750 mg at 09/05/19 2004  •  levothyroxine (SYNTHROID, LEVOTHROID) tablet 75 mcg, 75 mcg, Oral, Q AM, Leon Chin MD, 75 mcg at 09/06/19 0541  •  losartan (COZAAR) tablet 50 mg, 50 mg, Oral, Q24H, Leon Chin MD, 50 mg at 09/06/19 0844  •  magnesium oxide (MAG-OX) tablet 400 mg, 400 mg, Oral, Daily, Leon Chin MD, 400 mg at 09/06/19 0844  •  melatonin tablet 5 mg, 5 mg, Oral, Nightly PRN, Leon Chin MD  •  metoprolol tartrate (LOPRESSOR) tablet 12.5 mg, 12.5 mg, Oral, Q12H, Leon Chin MD, 12.5 mg at 09/06/19 0844  •  montelukast (SINGULAIR) tablet 10 mg, 10 mg, Oral, Nightly, Leon Chin MD, 10 mg at 09/05/19 2004  •  pantoprazole (PROTONIX) EC tablet 40 mg, 40 mg, Oral, Q AM, Leon Chin MD, 40 mg at 09/06/19 0541  •  [COMPLETED] Insert peripheral IV, , , Once **AND** sodium chloride 0.9 % flush 10 mL, 10 mL, Intravenous, PRN, Arnulfo Urbina MD, 10 mL at 09/04/19 0859     ASSESSMENT  Right lower lobe pneumonia   Degenerative disc  disease  Dehydration  Hypertension  Hypothyroidism  Coronary disease  Chronic anemia  Gastroesophageal reflux disease    PLAN  Discharge to subacute rehab on oral antibiotics for 3 more days  Discharge summary dictated    JOSHUA MELEDNEZ MD

## 2019-09-06 NOTE — PLAN OF CARE
Problem: Patient Care Overview  Goal: Plan of Care Review  Outcome: Ongoing (interventions implemented as appropriate)   09/06/19 0414   Coping/Psychosocial   Plan of Care Reviewed With patient   Plan of Care Review   Progress no change   OTHER   Outcome Summary VSS and afebrile. Almost constant complaint of neck pain that radiates to bilateral arms throughout the night, baclofen given. XRay C Spine ordered and completed, Lortab reordered and given. Got up to BSC once but was not able for the rest of the night. Kpad on with little relief. Turning q2hrs. Tolerating regular diet well. Labs to be repeated this morning. IV abx continued. Will cont to monitor.      Goal: Individualization and Mutuality  Outcome: Ongoing (interventions implemented as appropriate)    Goal: Discharge Needs Assessment  Outcome: Ongoing (interventions implemented as appropriate)    Goal: Interprofessional Rounds/Family Conf  Outcome: Ongoing (interventions implemented as appropriate)      Problem: Pneumonia (Adult)  Goal: Signs and Symptoms of Listed Potential Problems Will be Absent, Minimized or Managed (Pneumonia)  Outcome: Ongoing (interventions implemented as appropriate)      Problem: Activity Intolerance (Adult)  Goal: Activity Tolerance  Outcome: Ongoing (interventions implemented as appropriate)    Goal: Effective Energy Conservation Techniques  Outcome: Ongoing (interventions implemented as appropriate)      Problem: Fall Risk (Adult)  Goal: Absence of Fall  Outcome: Ongoing (interventions implemented as appropriate)      Problem: Skin Injury Risk (Adult)  Goal: Skin Health and Integrity  Outcome: Ongoing (interventions implemented as appropriate)

## 2019-09-06 NOTE — PROGRESS NOTES
Discharge Planning Assessment  [facility]     Patient Name: Dinah Mcnamara  MRN: [id number]  Today's Date: 9/6/2019    Admit Date: 9/1/2019    Discharge Needs Assessment    No documentation.       Discharge Plan     Row Name 09/06/19 1021       Plan    Plan  New referral to [facility] placed to today    Plan Comments  Spoke with patient and daughter Brisa Taylor [phone] at bedside regarding discharge plans daughter would like a referral to [facility] for short term skilled rehab patient is in agreement. Call referral to [facility] [phone] and placed in Epic. [facility]. Paulina England RN        Destination      Service Provider Request Status Selected Services Address Phone Number Fax Number    [facility] Pending - Request Sent N/A [address] [facility] [address] [phone]      Durable Medical Equipment      No service coordination in this encounter.      Dialysis/Infusion      No service coordination in this encounter.      Home Medical Care      Service Provider Request Status Selected Services Address Phone Number Fax Number    [facility] [facility] [address], [address] [address] [phone]      Therapy      No service coordination in this encounter.      Community Resources      No service coordination in this encounter.        Expected Discharge Date and Time     Expected Discharge Date Expected Discharge Time    Sep 5, 2019         Demographic Summary    No documentation.       Functional Status    No documentation.       Psychosocial    No documentation.       Abuse/Neglect    No documentation.       Legal    No documentation.       Substance Abuse    No documentation.       Patient Forms    No documentation.           Paulina England, RN

## 2019-09-06 NOTE — CONSULTS
"Adult Nutrition  Assessment/PES    Patient Name:  Dinah Mcnamara  YOB: 1922  MRN: [id number]  Admit Date:  9/1/2019    Assessment Date:  9/6/2019    Comments:  Nutrition screen trigger for BMI. No significant weight loss and intake is about 50% of meals. Feeds self with set up.  Boost supplement is being sent three times per day. Will follow for intake.     Reason for Assessment     Row Name 09/06/19 1109          Reason for Assessment    Reason For Assessment  follow-up protocol     Diagnosis  pulmonary disease pneumonia     Identified At Risk by Screening Criteria  MST SCORE 2+           Anthropometrics     Row Name 09/06/19 1112          Anthropometrics    Height  165.1 cm (65\")        Ideal Body Weight (IBW)    Ideal Body Weight (IBW) (kg)  57.29 (78% IBW)         Labs/Tests/Procedures/Meds     Row Name 09/06/19 1110          Labs/Procedures/Meds    Lab Results Reviewed  reviewed, pertinent        Diagnostic Tests/Procedures    Diagnostic Test/Procedure Reviewed  reviewed, pertinent        Medications    Pertinent Medications Reviewed  reviewed, pertinent     Pertinent Medications Comments  vitamin D, lasix, lactobacillus, levofloxacin, synthroid, protonix         Physical Findings     Row Name 09/06/19 1111          Physical Findings    Overall Physical Appearance  loss of subcutaneous fat;loss of muscle mass;underweight;generalized wasting     Skin  -- intact         Estimated/Assessed Needs     Row Name 09/06/19 1112          Calculation Measurements    Weight Used For Calculations  45.1 kg (99 lb 6.8 oz)     Height  165.1 cm (65\")        KCAL/KG    KCAL/KG  25 Kcal/Kg (kcal);30 Kcal/Kg (kcal)     25 Kcal/Kg (kcal)  1127.5     30 Kcal/Kg (kcal)  1353        Protein Requirements    Est Protein Requirement Amount (gms/kg)  1.2 gm protein     Estimated Protein Requirements (gms/day)  54.12        Fluid Requirements    Estimated Fluid Requirements (mL/day)  1353     Estimated Fluid Requirement " Method  RDA Method     RDA Method (mL)  1353         Nutrition Prescription Ordered     Row Name 09/06/19 1112          Nutrition Prescription PO    Current PO Diet  Regular     Fluid Consistency  Thin         Evaluation of Received Nutrient/Fluid Intake     Row Name 09/06/19 1112          PO Evaluation    Number of Meals  3     % PO Intake  50%               Problem/Interventions:  Problem 1     Row Name 09/06/19 1113          Nutrition Diagnoses Problem 1    Problem 1  Predicted Suboptimal Intake     Etiology (related to)  Medical Diagnosis;Functional Diagnosis     Pulmonary/Critical Care  Pneumonia     Functional Diagnosis  Vision deficit     Signs/Symptoms (evidenced by)  BMI     BMI  16 - 16.9                 Intervention Goal     Row Name 09/06/19 1113          Intervention Goal    General  Maintain nutrition     PO  Tolerate PO;PO intake (%)     PO Intake %  75 %     Weight  Maintain weight         Nutrition Intervention     Row Name 09/06/19 1113          Nutrition Intervention    RD/Tech Action  Follow Tx progress;Care plan reviewd;Encourage intake;Supplement provided         Nutrition Prescription     Row Name 09/06/19 1113          Nutrition Prescription PO    PO Prescription  Begin/change diet     Supplement  Boost Plus     Supplement Frequency  3 times a day     New PO Prescription Ordered?  Yes         Education/Evaluation     Row Name 09/06/19 1113          Monitor/Evaluation    Monitor  Per protocol;PO intake;Supplement intake           Electronically signed by:  Shaila Ricci RD, LD  09/06/19 11:14 AM

## 2019-09-06 NOTE — PROGRESS NOTES
Discharge Planning Assessment  [facility]     Patient Name: Dinah Mcnamara  MRN: [id number]  Today's Date: 9/6/2019    Admit Date: 9/1/2019    Discharge Needs Assessment    No documentation.       Discharge Plan     Row Name 09/06/19 1216       Plan    Plan  Patient has been accepted at [facility] and has a bed today    Final Discharge Disposition Code  03 - skilled nursing facility (SNF)    Final Note  Received call from [facility] has a bed today and can accept the patient. Dr Chin aware and will discharge the patient today. Daughter Brisa Taylor [phone] is aware and agrees with this discharge plan and will transport to [facility]. Paulina England RN    Row Name 09/06/19 1021       Plan    Plan  New referral to [facility] placed to today    Plan Comments  Spoke with patient and daughter Brisa Taylor [phone] at bedside regarding discharge plans daughter would like a referral to [facility] for short term skilled rehab patient is in agreement. Call referral to [facility] [phone] and placed in Epic. Daughter Brisa stated family can transport to [facility]. Packet in CCP office. Paulina England RN        Destination - Selection Complete      Service Provider Request Status Selected Services Address Phone Number Fax Number    [facility] Selected Skilled Nursing [address] [address] [phone]      Durable Medical Equipment      No service coordination in this encounter.      Dialysis/Infusion      No service coordination in this encounter.      Home Medical Care      Service Provider Request Status Selected Services Address Phone Number Fax Number    [facility] [address] [address] [phone]      Therapy      No service coordination in this encounter.      Community Resources      No service coordination in this encounter.        Expected  Discharge Date and Time     Expected Discharge Date Expected Discharge Time    Sep 5, 2019         Demographic Summary    No documentation.       Functional Status    No documentation.       Psychosocial    No documentation.       Abuse/Neglect    No documentation.       Legal    No documentation.       Substance Abuse    No documentation.       Patient Forms    No documentation.           Paulina England RN

## 2019-09-16 ENCOUNTER — HOSPITAL ENCOUNTER (INPATIENT)
Facility: HOSPITAL | Age: 84
LOS: 7 days | Discharge: HOME HEALTH | End: 2019-09-24
Attending: EMERGENCY MEDICINE | Admitting: INTERNAL MEDICINE

## 2019-09-16 ENCOUNTER — LAB REQUISITION (OUTPATIENT)
Dept: LAB | Facility: HOSPITAL | Age: 84
End: 2019-09-16

## 2019-09-16 DIAGNOSIS — Z00.00 ROUTINE GENERAL MEDICAL EXAMINATION AT A HEALTH CARE FACILITY: ICD-10-CM

## 2019-09-16 DIAGNOSIS — K62.5 RECTAL BLEEDING: Primary | ICD-10-CM

## 2019-09-16 LAB
BASOPHILS # BLD AUTO: 0.03 10*3/MM3 (ref 0–0.2)
BASOPHILS NFR BLD AUTO: 0.3 % (ref 0–1.5)
DEPRECATED RDW RBC AUTO: 51.9 FL (ref 37–54)
EOSINOPHIL # BLD AUTO: 0.05 10*3/MM3 (ref 0–0.4)
EOSINOPHIL NFR BLD AUTO: 0.5 % (ref 0.3–6.2)
ERYTHROCYTE [DISTWIDTH] IN BLOOD BY AUTOMATED COUNT: 14.3 % (ref 12.3–15.4)
HCT VFR BLD AUTO: 21.6 % (ref 34–46.6)
HGB BLD-MCNC: 6.5 G/DL (ref 12–15.9)
IMM GRANULOCYTES # BLD AUTO: 0.07 10*3/MM3 (ref 0–0.05)
IMM GRANULOCYTES NFR BLD AUTO: 0.7 % (ref 0–0.5)
LYMPHOCYTES # BLD AUTO: 2.22 10*3/MM3 (ref 0.7–3.1)
LYMPHOCYTES NFR BLD AUTO: 21.7 % (ref 19.6–45.3)
MAGNESIUM SERPL-MCNC: 1.8 MG/DL (ref 1.7–2.3)
MCH RBC QN AUTO: 30.4 PG (ref 26.6–33)
MCHC RBC AUTO-ENTMCNC: 30.1 G/DL (ref 31.5–35.7)
MCV RBC AUTO: 100.9 FL (ref 79–97)
MONOCYTES # BLD AUTO: 0.68 10*3/MM3 (ref 0.1–0.9)
MONOCYTES NFR BLD AUTO: 6.7 % (ref 5–12)
NEUTROPHILS # BLD AUTO: 7.17 10*3/MM3 (ref 1.7–7)
NEUTROPHILS NFR BLD AUTO: 70.1 % (ref 42.7–76)
NRBC BLD AUTO-RTO: 0 /100 WBC (ref 0–0.2)
PLATELET # BLD AUTO: 261 10*3/MM3 (ref 140–450)
PMV BLD AUTO: 11.3 FL (ref 6–12)
RBC # BLD AUTO: 2.14 10*6/MM3 (ref 3.77–5.28)
WBC NRBC COR # BLD: 10.22 10*3/MM3 (ref 3.4–10.8)

## 2019-09-16 PROCEDURE — 86900 BLOOD TYPING SEROLOGIC ABO: CPT

## 2019-09-16 PROCEDURE — 99285 EMERGENCY DEPT VISIT HI MDM: CPT

## 2019-09-16 PROCEDURE — 85025 COMPLETE CBC W/AUTO DIFF WBC: CPT

## 2019-09-16 PROCEDURE — 93005 ELECTROCARDIOGRAM TRACING: CPT | Performed by: EMERGENCY MEDICINE

## 2019-09-16 PROCEDURE — 86901 BLOOD TYPING SEROLOGIC RH(D): CPT

## 2019-09-16 PROCEDURE — 83735 ASSAY OF MAGNESIUM: CPT

## 2019-09-16 RX ORDER — SODIUM CHLORIDE 9 MG/ML
75 INJECTION, SOLUTION INTRAVENOUS CONTINUOUS
Status: DISCONTINUED | OUTPATIENT
Start: 2019-09-16 | End: 2019-09-18

## 2019-09-16 RX ORDER — SODIUM CHLORIDE 0.9 % (FLUSH) 0.9 %
10 SYRINGE (ML) INJECTION AS NEEDED
Status: DISCONTINUED | OUTPATIENT
Start: 2019-09-16 | End: 2019-09-24 | Stop reason: HOSPADM

## 2019-09-17 ENCOUNTER — APPOINTMENT (OUTPATIENT)
Dept: GENERAL RADIOLOGY | Facility: HOSPITAL | Age: 84
End: 2019-09-17

## 2019-09-17 ENCOUNTER — APPOINTMENT (OUTPATIENT)
Dept: CT IMAGING | Facility: HOSPITAL | Age: 84
End: 2019-09-17

## 2019-09-17 ENCOUNTER — INPATIENT HOSPITAL (OUTPATIENT)
Dept: URBAN - METROPOLITAN AREA HOSPITAL 113 | Facility: HOSPITAL | Age: 84
End: 2019-09-17
Payer: COMMERCIAL

## 2019-09-17 DIAGNOSIS — D50.0 IRON DEFICIENCY ANEMIA SECONDARY TO BLOOD LOSS (CHRONIC): ICD-10-CM

## 2019-09-17 DIAGNOSIS — R10.32 LEFT LOWER QUADRANT PAIN: ICD-10-CM

## 2019-09-17 DIAGNOSIS — K92.1 MELENA: ICD-10-CM

## 2019-09-17 PROBLEM — D64.9 SEVERE ANEMIA: Status: ACTIVE | Noted: 2019-09-17

## 2019-09-17 PROBLEM — K62.5 RECTAL BLEEDING: Status: ACTIVE | Noted: 2019-09-17

## 2019-09-17 LAB
ABO GROUP BLD: NORMAL
ALBUMIN SERPL-MCNC: 2.8 G/DL (ref 3.5–5.2)
ALBUMIN/GLOB SERPL: 1.3 G/DL
ALP SERPL-CCNC: 56 U/L (ref 39–117)
ALT SERPL W P-5'-P-CCNC: 9 U/L (ref 1–33)
ANION GAP SERPL CALCULATED.3IONS-SCNC: 11.6 MMOL/L (ref 5–15)
ANTI-D: NORMAL
AST SERPL-CCNC: 18 U/L (ref 1–32)
BASOPHILS # BLD AUTO: 0.02 10*3/MM3 (ref 0–0.2)
BASOPHILS NFR BLD AUTO: 0.1 % (ref 0–1.5)
BILIRUB SERPL-MCNC: <0.2 MG/DL (ref 0.2–1.2)
BLD GP AB SCN SERPL QL: POSITIVE
BUN BLD-MCNC: 78 MG/DL (ref 8–23)
BUN/CREAT SERPL: 67.2 (ref 7–25)
CALCIUM SPEC-SCNC: 9.1 MG/DL (ref 8.2–9.6)
CHLORIDE SERPL-SCNC: 103 MMOL/L (ref 98–107)
CO2 SERPL-SCNC: 24.4 MMOL/L (ref 22–29)
CREAT BLD-MCNC: 1.16 MG/DL (ref 0.57–1)
DEPRECATED RDW RBC AUTO: 54.1 FL (ref 37–54)
EOSINOPHIL # BLD AUTO: 0.04 10*3/MM3 (ref 0–0.4)
EOSINOPHIL NFR BLD AUTO: 0.3 % (ref 0.3–6.2)
ERYTHROCYTE [DISTWIDTH] IN BLOOD BY AUTOMATED COUNT: 14.5 % (ref 12.3–15.4)
GFR SERPL CREATININE-BSD FRML MDRD: 43 ML/MIN/1.73
GLOBULIN UR ELPH-MCNC: 2.2 GM/DL
GLUCOSE BLD-MCNC: 132 MG/DL (ref 65–99)
GLUCOSE BLDC GLUCOMTR-MCNC: 128 MG/DL (ref 70–130)
HCT VFR BLD AUTO: 18.1 % (ref 34–46.6)
HCT VFR BLD AUTO: 23.9 % (ref 34–46.6)
HCT VFR BLD AUTO: 34.1 % (ref 34–46.6)
HGB BLD-MCNC: 10.7 G/DL (ref 12–15.9)
HGB BLD-MCNC: 5.6 G/DL (ref 12–15.9)
HGB BLD-MCNC: 7.8 G/DL (ref 12–15.9)
IMM GRANULOCYTES # BLD AUTO: 0.11 10*3/MM3 (ref 0–0.05)
IMM GRANULOCYTES NFR BLD AUTO: 0.8 % (ref 0–0.5)
INR PPP: 1.03 (ref 0.9–1.1)
LYMPHOCYTES # BLD AUTO: 2.83 10*3/MM3 (ref 0.7–3.1)
LYMPHOCYTES NFR BLD AUTO: 19.8 % (ref 19.6–45.3)
MCH RBC QN AUTO: 32.2 PG (ref 26.6–33)
MCHC RBC AUTO-ENTMCNC: 30.9 G/DL (ref 31.5–35.7)
MCV RBC AUTO: 104 FL (ref 79–97)
MONOCYTES # BLD AUTO: 0.67 10*3/MM3 (ref 0.1–0.9)
MONOCYTES NFR BLD AUTO: 4.7 % (ref 5–12)
NEUTROPHILS # BLD AUTO: 10.61 10*3/MM3 (ref 1.7–7)
NEUTROPHILS NFR BLD AUTO: 74.3 % (ref 42.7–76)
NRBC BLD AUTO-RTO: 0.1 /100 WBC (ref 0–0.2)
PLATELET # BLD AUTO: 238 10*3/MM3 (ref 140–450)
PMV BLD AUTO: 11.1 FL (ref 6–12)
POTASSIUM BLD-SCNC: 4.8 MMOL/L (ref 3.5–5.2)
PROT SERPL-MCNC: 5 G/DL (ref 6–8.5)
PROTHROMBIN TIME: 13.2 SECONDS (ref 11.7–14.2)
RBC # BLD AUTO: 1.74 10*6/MM3 (ref 3.77–5.28)
RH BLD: NEGATIVE
SODIUM BLD-SCNC: 139 MMOL/L (ref 136–145)
T&S EXPIRATION DATE: NORMAL
TROPONIN T SERPL-MCNC: 0.01 NG/ML (ref 0–0.03)
TROPONIN T SERPL-MCNC: 0.12 NG/ML (ref 0–0.03)
WBC NRBC COR # BLD: 14.28 10*3/MM3 (ref 3.4–10.8)

## 2019-09-17 PROCEDURE — 93005 ELECTROCARDIOGRAM TRACING: CPT | Performed by: INTERNAL MEDICINE

## 2019-09-17 PROCEDURE — 86920 COMPATIBILITY TEST SPIN: CPT

## 2019-09-17 PROCEDURE — 80053 COMPREHEN METABOLIC PANEL: CPT | Performed by: EMERGENCY MEDICINE

## 2019-09-17 PROCEDURE — 93010 ELECTROCARDIOGRAM REPORT: CPT | Performed by: INTERNAL MEDICINE

## 2019-09-17 PROCEDURE — 85014 HEMATOCRIT: CPT | Performed by: INTERNAL MEDICINE

## 2019-09-17 PROCEDURE — 85025 COMPLETE CBC W/AUTO DIFF WBC: CPT | Performed by: EMERGENCY MEDICINE

## 2019-09-17 PROCEDURE — 84484 ASSAY OF TROPONIN QUANT: CPT | Performed by: EMERGENCY MEDICINE

## 2019-09-17 PROCEDURE — 86900 BLOOD TYPING SEROLOGIC ABO: CPT | Performed by: EMERGENCY MEDICINE

## 2019-09-17 PROCEDURE — 84484 ASSAY OF TROPONIN QUANT: CPT | Performed by: INTERNAL MEDICINE

## 2019-09-17 PROCEDURE — 86922 COMPATIBILITY TEST ANTIGLOB: CPT

## 2019-09-17 PROCEDURE — 82962 GLUCOSE BLOOD TEST: CPT

## 2019-09-17 PROCEDURE — 85610 PROTHROMBIN TIME: CPT | Performed by: EMERGENCY MEDICINE

## 2019-09-17 PROCEDURE — 86901 BLOOD TYPING SEROLOGIC RH(D): CPT | Performed by: EMERGENCY MEDICINE

## 2019-09-17 PROCEDURE — 94799 UNLISTED PULMONARY SVC/PX: CPT

## 2019-09-17 PROCEDURE — P9016 RBC LEUKOCYTES REDUCED: HCPCS

## 2019-09-17 PROCEDURE — 85018 HEMOGLOBIN: CPT | Performed by: INTERNAL MEDICINE

## 2019-09-17 PROCEDURE — 99223 1ST HOSP IP/OBS HIGH 75: CPT | Performed by: INTERNAL MEDICINE

## 2019-09-17 PROCEDURE — 86850 RBC ANTIBODY SCREEN: CPT | Performed by: EMERGENCY MEDICINE

## 2019-09-17 PROCEDURE — 86901 BLOOD TYPING SEROLOGIC RH(D): CPT

## 2019-09-17 PROCEDURE — 36430 TRANSFUSION BLD/BLD COMPNT: CPT

## 2019-09-17 PROCEDURE — 99222 1ST HOSP IP/OBS MODERATE 55: CPT | Performed by: INTERNAL MEDICINE

## 2019-09-17 PROCEDURE — 86900 BLOOD TYPING SEROLOGIC ABO: CPT

## 2019-09-17 PROCEDURE — 71045 X-RAY EXAM CHEST 1 VIEW: CPT

## 2019-09-17 PROCEDURE — C1751 CATH, INF, PER/CENT/MIDLINE: HCPCS

## 2019-09-17 PROCEDURE — 86870 RBC ANTIBODY IDENTIFICATION: CPT | Performed by: EMERGENCY MEDICINE

## 2019-09-17 PROCEDURE — 94640 AIRWAY INHALATION TREATMENT: CPT

## 2019-09-17 PROCEDURE — 74176 CT ABD & PELVIS W/O CONTRAST: CPT

## 2019-09-17 RX ORDER — SODIUM CHLORIDE 0.9 % (FLUSH) 0.9 %
10 SYRINGE (ML) INJECTION EVERY 12 HOURS SCHEDULED
Status: DISCONTINUED | OUTPATIENT
Start: 2019-09-17 | End: 2019-09-24 | Stop reason: HOSPADM

## 2019-09-17 RX ORDER — PANTOPRAZOLE SODIUM 40 MG/10ML
80 INJECTION, POWDER, LYOPHILIZED, FOR SOLUTION INTRAVENOUS ONCE
Status: COMPLETED | OUTPATIENT
Start: 2019-09-17 | End: 2019-09-17

## 2019-09-17 RX ORDER — SODIUM CHLORIDE 0.9 % (FLUSH) 0.9 %
20 SYRINGE (ML) INJECTION AS NEEDED
Status: DISCONTINUED | OUTPATIENT
Start: 2019-09-17 | End: 2019-09-24 | Stop reason: HOSPADM

## 2019-09-17 RX ORDER — IPRATROPIUM BROMIDE AND ALBUTEROL SULFATE 2.5; .5 MG/3ML; MG/3ML
3 SOLUTION RESPIRATORY (INHALATION)
Status: DISCONTINUED | OUTPATIENT
Start: 2019-09-17 | End: 2019-09-18

## 2019-09-17 RX ORDER — SODIUM CHLORIDE 0.9 % (FLUSH) 0.9 %
10 SYRINGE (ML) INJECTION AS NEEDED
Status: DISCONTINUED | OUTPATIENT
Start: 2019-09-17 | End: 2019-09-24 | Stop reason: HOSPADM

## 2019-09-17 RX ORDER — BACLOFEN 10 MG/1
5 TABLET ORAL NIGHTLY
COMMUNITY
End: 2020-11-05

## 2019-09-17 RX ORDER — BISACODYL 10 MG
10 SUPPOSITORY, RECTAL RECTAL DAILY
COMMUNITY
End: 2020-11-05

## 2019-09-17 RX ORDER — LOPERAMIDE HYDROCHLORIDE 2 MG/1
2 CAPSULE ORAL AS NEEDED
COMMUNITY
End: 2020-11-05

## 2019-09-17 RX ORDER — ACETAMINOPHEN 500 MG
500 TABLET ORAL EVERY 4 HOURS PRN
COMMUNITY
End: 2020-11-05

## 2019-09-17 RX ADMIN — SODIUM CHLORIDE 8 MG/HR: 900 INJECTION INTRAVENOUS at 05:19

## 2019-09-17 RX ADMIN — SODIUM CHLORIDE 8 MG/HR: 900 INJECTION INTRAVENOUS at 22:10

## 2019-09-17 RX ADMIN — NITROGLYCERIN 1 INCH: 20 OINTMENT TOPICAL at 18:48

## 2019-09-17 RX ADMIN — IPRATROPIUM BROMIDE AND ALBUTEROL SULFATE 3 ML: 2.5; .5 SOLUTION RESPIRATORY (INHALATION) at 15:57

## 2019-09-17 RX ADMIN — SODIUM CHLORIDE 8 MG/HR: 900 INJECTION INTRAVENOUS at 16:15

## 2019-09-17 RX ADMIN — SODIUM CHLORIDE, PRESERVATIVE FREE 10 ML: 5 INJECTION INTRAVENOUS at 20:53

## 2019-09-17 RX ADMIN — PANTOPRAZOLE SODIUM 80 MG: 40 INJECTION, POWDER, LYOPHILIZED, FOR SOLUTION INTRAVENOUS at 04:43

## 2019-09-17 RX ADMIN — IPRATROPIUM BROMIDE AND ALBUTEROL SULFATE 3 ML: 2.5; .5 SOLUTION RESPIRATORY (INHALATION) at 21:08

## 2019-09-17 RX ADMIN — SODIUM CHLORIDE 125 ML/HR: 9 INJECTION, SOLUTION INTRAVENOUS at 01:22

## 2019-09-17 RX ADMIN — METOPROLOL TARTRATE 25 MG: 25 TABLET ORAL at 20:52

## 2019-09-17 RX ADMIN — SODIUM CHLORIDE 500 ML: 9 INJECTION, SOLUTION INTRAVENOUS at 01:22

## 2019-09-17 RX ADMIN — SODIUM CHLORIDE 8 MG/HR: 900 INJECTION INTRAVENOUS at 10:01

## 2019-09-17 RX ADMIN — METOPROLOL TARTRATE 25 MG: 25 TABLET ORAL at 10:01

## 2019-09-17 RX ADMIN — NITROGLYCERIN 1 INCH: 20 OINTMENT TOPICAL at 02:06

## 2019-09-17 RX ADMIN — SODIUM CHLORIDE, PRESERVATIVE FREE 20 ML: 5 INJECTION INTRAVENOUS at 22:40

## 2019-09-17 RX ADMIN — SODIUM CHLORIDE, PRESERVATIVE FREE 10 ML: 5 INJECTION INTRAVENOUS at 10:01

## 2019-09-18 ENCOUNTER — APPOINTMENT (OUTPATIENT)
Dept: GENERAL RADIOLOGY | Facility: HOSPITAL | Age: 84
End: 2019-09-18

## 2019-09-18 LAB
ABO + RH BLD: NORMAL
ABO + RH BLD: NORMAL
ALBUMIN SERPL-MCNC: 2.8 G/DL (ref 3.5–5.2)
ANION GAP SERPL CALCULATED.3IONS-SCNC: 8 MMOL/L (ref 5–15)
BASOPHILS # BLD AUTO: 0.06 10*3/MM3 (ref 0–0.2)
BASOPHILS NFR BLD AUTO: 0.8 % (ref 0–1.5)
BH BB BLOOD EXPIRATION DATE: NORMAL
BH BB BLOOD EXPIRATION DATE: NORMAL
BH BB BLOOD TYPE BARCODE: 600
BH BB BLOOD TYPE BARCODE: 600
BH BB DISPENSE STATUS: NORMAL
BH BB DISPENSE STATUS: NORMAL
BH BB PRODUCT CODE: NORMAL
BH BB PRODUCT CODE: NORMAL
BH BB UNIT NUMBER: NORMAL
BH BB UNIT NUMBER: NORMAL
BUN BLD-MCNC: 43 MG/DL (ref 8–23)
BUN/CREAT SERPL: 58.1 (ref 7–25)
CALCIUM SPEC-SCNC: 8.3 MG/DL (ref 8.2–9.6)
CHLORIDE SERPL-SCNC: 113 MMOL/L (ref 98–107)
CO2 SERPL-SCNC: 23 MMOL/L (ref 22–29)
CREAT BLD-MCNC: 0.74 MG/DL (ref 0.57–1)
CROSSMATCH INTERPRETATION: NORMAL
CROSSMATCH INTERPRETATION: NORMAL
DEPRECATED RDW RBC AUTO: 49.3 FL (ref 37–54)
EOSINOPHIL # BLD AUTO: 0.32 10*3/MM3 (ref 0–0.4)
EOSINOPHIL NFR BLD AUTO: 4.3 % (ref 0.3–6.2)
ERYTHROCYTE [DISTWIDTH] IN BLOOD BY AUTOMATED COUNT: 14.8 % (ref 12.3–15.4)
GFR SERPL CREATININE-BSD FRML MDRD: 73 ML/MIN/1.73
GLUCOSE BLD-MCNC: 87 MG/DL (ref 65–99)
HCT VFR BLD AUTO: 26.7 % (ref 34–46.6)
HCT VFR BLD AUTO: 28.4 % (ref 34–46.6)
HCT VFR BLD AUTO: 28.5 % (ref 34–46.6)
HGB BLD-MCNC: 8.4 G/DL (ref 12–15.9)
HGB BLD-MCNC: 9.2 G/DL (ref 12–15.9)
HGB BLD-MCNC: 9.3 G/DL (ref 12–15.9)
IMM GRANULOCYTES # BLD AUTO: 0.03 10*3/MM3 (ref 0–0.05)
IMM GRANULOCYTES NFR BLD AUTO: 0.4 % (ref 0–0.5)
LYMPHOCYTES # BLD AUTO: 1.17 10*3/MM3 (ref 0.7–3.1)
LYMPHOCYTES NFR BLD AUTO: 15.9 % (ref 19.6–45.3)
MCH RBC QN AUTO: 29.2 PG (ref 26.6–33)
MCHC RBC AUTO-ENTMCNC: 31.5 G/DL (ref 31.5–35.7)
MCV RBC AUTO: 92.7 FL (ref 79–97)
MONOCYTES # BLD AUTO: 0.52 10*3/MM3 (ref 0.1–0.9)
MONOCYTES NFR BLD AUTO: 7.1 % (ref 5–12)
NEUTROPHILS # BLD AUTO: 5.26 10*3/MM3 (ref 1.7–7)
NEUTROPHILS NFR BLD AUTO: 71.5 % (ref 42.7–76)
NRBC BLD AUTO-RTO: 0 /100 WBC (ref 0–0.2)
NT-PROBNP SERPL-MCNC: 6778 PG/ML (ref 5–1800)
PHOSPHATE SERPL-MCNC: 2.3 MG/DL (ref 2.5–4.5)
PLATELET # BLD AUTO: 145 10*3/MM3 (ref 140–450)
PMV BLD AUTO: 10.4 FL (ref 6–12)
POTASSIUM BLD-SCNC: 3.7 MMOL/L (ref 3.5–5.2)
RBC # BLD AUTO: 2.88 10*6/MM3 (ref 3.77–5.28)
SODIUM BLD-SCNC: 144 MMOL/L (ref 136–145)
UNIT  ABO: NORMAL
UNIT  ABO: NORMAL
UNIT  RH: NORMAL
UNIT  RH: NORMAL
WBC NRBC COR # BLD: 7.36 10*3/MM3 (ref 3.4–10.8)

## 2019-09-18 PROCEDURE — 71045 X-RAY EXAM CHEST 1 VIEW: CPT

## 2019-09-18 PROCEDURE — 85014 HEMATOCRIT: CPT | Performed by: INTERNAL MEDICINE

## 2019-09-18 PROCEDURE — 80069 RENAL FUNCTION PANEL: CPT | Performed by: INTERNAL MEDICINE

## 2019-09-18 PROCEDURE — P9016 RBC LEUKOCYTES REDUCED: HCPCS

## 2019-09-18 PROCEDURE — 94799 UNLISTED PULMONARY SVC/PX: CPT

## 2019-09-18 PROCEDURE — 86900 BLOOD TYPING SEROLOGIC ABO: CPT

## 2019-09-18 PROCEDURE — 99232 SBSQ HOSP IP/OBS MODERATE 35: CPT | Performed by: INTERNAL MEDICINE

## 2019-09-18 PROCEDURE — 83880 ASSAY OF NATRIURETIC PEPTIDE: CPT | Performed by: INTERNAL MEDICINE

## 2019-09-18 PROCEDURE — 85025 COMPLETE CBC W/AUTO DIFF WBC: CPT | Performed by: INTERNAL MEDICINE

## 2019-09-18 PROCEDURE — 99232 SBSQ HOSP IP/OBS MODERATE 35: CPT | Performed by: NURSE PRACTITIONER

## 2019-09-18 PROCEDURE — 36430 TRANSFUSION BLD/BLD COMPNT: CPT

## 2019-09-18 PROCEDURE — 85018 HEMOGLOBIN: CPT | Performed by: INTERNAL MEDICINE

## 2019-09-18 RX ORDER — LISINOPRIL 2.5 MG/1
2.5 TABLET ORAL
Status: DISCONTINUED | OUTPATIENT
Start: 2019-09-18 | End: 2019-09-20

## 2019-09-18 RX ORDER — IPRATROPIUM BROMIDE AND ALBUTEROL SULFATE 2.5; .5 MG/3ML; MG/3ML
3 SOLUTION RESPIRATORY (INHALATION)
Status: DISCONTINUED | OUTPATIENT
Start: 2019-09-18 | End: 2019-09-20

## 2019-09-18 RX ORDER — ISOSORBIDE MONONITRATE 30 MG/1
30 TABLET, EXTENDED RELEASE ORAL
Status: DISCONTINUED | OUTPATIENT
Start: 2019-09-18 | End: 2019-09-24 | Stop reason: HOSPADM

## 2019-09-18 RX ADMIN — METOPROLOL TARTRATE 25 MG: 25 TABLET ORAL at 20:48

## 2019-09-18 RX ADMIN — IPRATROPIUM BROMIDE AND ALBUTEROL SULFATE 3 ML: 2.5; .5 SOLUTION RESPIRATORY (INHALATION) at 19:05

## 2019-09-18 RX ADMIN — LISINOPRIL 2.5 MG: 2.5 TABLET ORAL at 18:13

## 2019-09-18 RX ADMIN — SODIUM CHLORIDE 8 MG/HR: 900 INJECTION INTRAVENOUS at 12:00

## 2019-09-18 RX ADMIN — ISOSORBIDE MONONITRATE 30 MG: 30 TABLET ORAL at 12:07

## 2019-09-18 RX ADMIN — IPRATROPIUM BROMIDE AND ALBUTEROL SULFATE 3 ML: 2.5; .5 SOLUTION RESPIRATORY (INHALATION) at 13:34

## 2019-09-18 RX ADMIN — SODIUM CHLORIDE 8 MG/HR: 900 INJECTION INTRAVENOUS at 18:13

## 2019-09-18 RX ADMIN — SODIUM CHLORIDE 8 MG/HR: 900 INJECTION INTRAVENOUS at 23:10

## 2019-09-18 RX ADMIN — IPRATROPIUM BROMIDE AND ALBUTEROL SULFATE 3 ML: 2.5; .5 SOLUTION RESPIRATORY (INHALATION) at 10:43

## 2019-09-18 RX ADMIN — METOPROLOL TARTRATE 25 MG: 25 TABLET ORAL at 09:05

## 2019-09-18 RX ADMIN — SODIUM CHLORIDE, PRESERVATIVE FREE 20 ML: 5 INJECTION INTRAVENOUS at 20:48

## 2019-09-19 ENCOUNTER — INPATIENT HOSPITAL (OUTPATIENT)
Dept: URBAN - METROPOLITAN AREA HOSPITAL 113 | Facility: HOSPITAL | Age: 84
End: 2019-09-19
Payer: COMMERCIAL

## 2019-09-19 DIAGNOSIS — K92.2 GASTROINTESTINAL HEMORRHAGE, UNSPECIFIED: ICD-10-CM

## 2019-09-19 DIAGNOSIS — D50.9 IRON DEFICIENCY ANEMIA, UNSPECIFIED: ICD-10-CM

## 2019-09-19 DIAGNOSIS — K44.9 DIAPHRAGMATIC HERNIA WITHOUT OBSTRUCTION OR GANGRENE: ICD-10-CM

## 2019-09-19 DIAGNOSIS — K62.3 RECTAL PROLAPSE: ICD-10-CM

## 2019-09-19 LAB
ALBUMIN SERPL-MCNC: 2.4 G/DL (ref 3.5–5.2)
ANION GAP SERPL CALCULATED.3IONS-SCNC: 10.1 MMOL/L (ref 5–15)
BASOPHILS # BLD AUTO: 0.04 10*3/MM3 (ref 0–0.2)
BASOPHILS NFR BLD AUTO: 0.6 % (ref 0–1.5)
BUN BLD-MCNC: 34 MG/DL (ref 8–23)
BUN/CREAT SERPL: 37 (ref 7–25)
CALCIUM SPEC-SCNC: 8.7 MG/DL (ref 8.2–9.6)
CHLORIDE SERPL-SCNC: 108 MMOL/L (ref 98–107)
CO2 SERPL-SCNC: 20.9 MMOL/L (ref 22–29)
CREAT BLD-MCNC: 0.92 MG/DL (ref 0.57–1)
DEPRECATED RDW RBC AUTO: 51.7 FL (ref 37–54)
EOSINOPHIL # BLD AUTO: 0.4 10*3/MM3 (ref 0–0.4)
EOSINOPHIL NFR BLD AUTO: 6 % (ref 0.3–6.2)
ERYTHROCYTE [DISTWIDTH] IN BLOOD BY AUTOMATED COUNT: 14.8 % (ref 12.3–15.4)
GFR SERPL CREATININE-BSD FRML MDRD: 56 ML/MIN/1.73
GLUCOSE BLD-MCNC: 99 MG/DL (ref 65–99)
HCT VFR BLD AUTO: 26.5 % (ref 34–46.6)
HCT VFR BLD AUTO: 26.6 % (ref 34–46.6)
HGB BLD-MCNC: 8.4 G/DL (ref 12–15.9)
HGB BLD-MCNC: 8.4 G/DL (ref 12–15.9)
IMM GRANULOCYTES # BLD AUTO: 0.01 10*3/MM3 (ref 0–0.05)
IMM GRANULOCYTES NFR BLD AUTO: 0.2 % (ref 0–0.5)
LYMPHOCYTES # BLD AUTO: 1.32 10*3/MM3 (ref 0.7–3.1)
LYMPHOCYTES NFR BLD AUTO: 19.8 % (ref 19.6–45.3)
MCH RBC QN AUTO: 30 PG (ref 26.6–33)
MCHC RBC AUTO-ENTMCNC: 31.7 G/DL (ref 31.5–35.7)
MCV RBC AUTO: 94.6 FL (ref 79–97)
MONOCYTES # BLD AUTO: 0.54 10*3/MM3 (ref 0.1–0.9)
MONOCYTES NFR BLD AUTO: 8.1 % (ref 5–12)
NEUTROPHILS # BLD AUTO: 4.35 10*3/MM3 (ref 1.7–7)
NEUTROPHILS NFR BLD AUTO: 65.3 % (ref 42.7–76)
NRBC BLD AUTO-RTO: 0.2 /100 WBC (ref 0–0.2)
PHOSPHATE SERPL-MCNC: 2.7 MG/DL (ref 2.5–4.5)
PLATELET # BLD AUTO: 150 10*3/MM3 (ref 140–450)
PMV BLD AUTO: 11 FL (ref 6–12)
POTASSIUM BLD-SCNC: 3.9 MMOL/L (ref 3.5–5.2)
RBC # BLD AUTO: 2.8 10*6/MM3 (ref 3.77–5.28)
SODIUM BLD-SCNC: 139 MMOL/L (ref 136–145)
WBC NRBC COR # BLD: 6.66 10*3/MM3 (ref 3.4–10.8)

## 2019-09-19 PROCEDURE — 80069 RENAL FUNCTION PANEL: CPT | Performed by: INTERNAL MEDICINE

## 2019-09-19 PROCEDURE — 85018 HEMOGLOBIN: CPT | Performed by: INTERNAL MEDICINE

## 2019-09-19 PROCEDURE — 85014 HEMATOCRIT: CPT | Performed by: INTERNAL MEDICINE

## 2019-09-19 PROCEDURE — 99232 SBSQ HOSP IP/OBS MODERATE 35: CPT | Performed by: INTERNAL MEDICINE

## 2019-09-19 PROCEDURE — 94799 UNLISTED PULMONARY SVC/PX: CPT

## 2019-09-19 PROCEDURE — 85025 COMPLETE CBC W/AUTO DIFF WBC: CPT | Performed by: INTERNAL MEDICINE

## 2019-09-19 PROCEDURE — 87338 HPYLORI STOOL AG IA: CPT | Performed by: INTERNAL MEDICINE

## 2019-09-19 RX ORDER — PANTOPRAZOLE SODIUM 40 MG/10ML
40 INJECTION, POWDER, LYOPHILIZED, FOR SOLUTION INTRAVENOUS
Status: DISCONTINUED | OUTPATIENT
Start: 2019-09-19 | End: 2019-09-20

## 2019-09-19 RX ADMIN — ISOSORBIDE MONONITRATE 30 MG: 30 TABLET ORAL at 09:46

## 2019-09-19 RX ADMIN — IPRATROPIUM BROMIDE AND ALBUTEROL SULFATE 3 ML: 2.5; .5 SOLUTION RESPIRATORY (INHALATION) at 11:24

## 2019-09-19 RX ADMIN — LISINOPRIL 2.5 MG: 2.5 TABLET ORAL at 09:46

## 2019-09-19 RX ADMIN — SODIUM CHLORIDE, PRESERVATIVE FREE 20 ML: 5 INJECTION INTRAVENOUS at 22:18

## 2019-09-19 RX ADMIN — METOPROLOL TARTRATE 25 MG: 25 TABLET ORAL at 09:46

## 2019-09-19 RX ADMIN — SODIUM CHLORIDE 8 MG/HR: 900 INJECTION INTRAVENOUS at 05:02

## 2019-09-19 RX ADMIN — IPRATROPIUM BROMIDE AND ALBUTEROL SULFATE 3 ML: 2.5; .5 SOLUTION RESPIRATORY (INHALATION) at 20:36

## 2019-09-19 RX ADMIN — IPRATROPIUM BROMIDE AND ALBUTEROL SULFATE 3 ML: 2.5; .5 SOLUTION RESPIRATORY (INHALATION) at 07:22

## 2019-09-19 RX ADMIN — PANTOPRAZOLE SODIUM 40 MG: 40 INJECTION, POWDER, LYOPHILIZED, FOR SOLUTION INTRAVENOUS at 16:59

## 2019-09-19 RX ADMIN — IPRATROPIUM BROMIDE AND ALBUTEROL SULFATE 3 ML: 2.5; .5 SOLUTION RESPIRATORY (INHALATION) at 15:37

## 2019-09-19 RX ADMIN — METOPROLOL TARTRATE 25 MG: 25 TABLET ORAL at 22:15

## 2019-09-20 ENCOUNTER — APPOINTMENT (OUTPATIENT)
Dept: GENERAL RADIOLOGY | Facility: HOSPITAL | Age: 84
End: 2019-09-20

## 2019-09-20 LAB
ALBUMIN SERPL-MCNC: 2.4 G/DL (ref 3.5–5.2)
ANION GAP SERPL CALCULATED.3IONS-SCNC: 9.3 MMOL/L (ref 5–15)
BASOPHILS # BLD AUTO: 0.04 10*3/MM3 (ref 0–0.2)
BASOPHILS NFR BLD AUTO: 0.7 % (ref 0–1.5)
BUN BLD-MCNC: 25 MG/DL (ref 8–23)
BUN/CREAT SERPL: 31.6 (ref 7–25)
CALCIUM SPEC-SCNC: 9 MG/DL (ref 8.2–9.6)
CHLORIDE SERPL-SCNC: 107 MMOL/L (ref 98–107)
CO2 SERPL-SCNC: 22.7 MMOL/L (ref 22–29)
CREAT BLD-MCNC: 0.79 MG/DL (ref 0.57–1)
DEPRECATED RDW RBC AUTO: 49.3 FL (ref 37–54)
EOSINOPHIL # BLD AUTO: 0.47 10*3/MM3 (ref 0–0.4)
EOSINOPHIL NFR BLD AUTO: 8.7 % (ref 0.3–6.2)
ERYTHROCYTE [DISTWIDTH] IN BLOOD BY AUTOMATED COUNT: 14.6 % (ref 12.3–15.4)
GFR SERPL CREATININE-BSD FRML MDRD: 67 ML/MIN/1.73
GLUCOSE BLD-MCNC: 99 MG/DL (ref 65–99)
HCT VFR BLD AUTO: 25.7 % (ref 34–46.6)
HGB BLD-MCNC: 8.4 G/DL (ref 12–15.9)
IMM GRANULOCYTES # BLD AUTO: 0.02 10*3/MM3 (ref 0–0.05)
IMM GRANULOCYTES NFR BLD AUTO: 0.4 % (ref 0–0.5)
LYMPHOCYTES # BLD AUTO: 1.13 10*3/MM3 (ref 0.7–3.1)
LYMPHOCYTES NFR BLD AUTO: 21 % (ref 19.6–45.3)
MCH RBC QN AUTO: 30.1 PG (ref 26.6–33)
MCHC RBC AUTO-ENTMCNC: 32.7 G/DL (ref 31.5–35.7)
MCV RBC AUTO: 92.1 FL (ref 79–97)
MONOCYTES # BLD AUTO: 0.42 10*3/MM3 (ref 0.1–0.9)
MONOCYTES NFR BLD AUTO: 7.8 % (ref 5–12)
NEUTROPHILS # BLD AUTO: 3.31 10*3/MM3 (ref 1.7–7)
NEUTROPHILS NFR BLD AUTO: 61.4 % (ref 42.7–76)
NRBC BLD AUTO-RTO: 0 /100 WBC (ref 0–0.2)
NT-PROBNP SERPL-MCNC: 3730 PG/ML (ref 5–1800)
PHOSPHATE SERPL-MCNC: 2.3 MG/DL (ref 2.5–4.5)
PLATELET # BLD AUTO: 148 10*3/MM3 (ref 140–450)
PMV BLD AUTO: 10.6 FL (ref 6–12)
POTASSIUM BLD-SCNC: 3.8 MMOL/L (ref 3.5–5.2)
RBC # BLD AUTO: 2.79 10*6/MM3 (ref 3.77–5.28)
SODIUM BLD-SCNC: 139 MMOL/L (ref 136–145)
WBC NRBC COR # BLD: 5.39 10*3/MM3 (ref 3.4–10.8)

## 2019-09-20 PROCEDURE — 97110 THERAPEUTIC EXERCISES: CPT

## 2019-09-20 PROCEDURE — 97162 PT EVAL MOD COMPLEX 30 MIN: CPT

## 2019-09-20 PROCEDURE — 80069 RENAL FUNCTION PANEL: CPT | Performed by: INTERNAL MEDICINE

## 2019-09-20 PROCEDURE — 94799 UNLISTED PULMONARY SVC/PX: CPT

## 2019-09-20 PROCEDURE — 71045 X-RAY EXAM CHEST 1 VIEW: CPT

## 2019-09-20 PROCEDURE — 99232 SBSQ HOSP IP/OBS MODERATE 35: CPT | Performed by: INTERNAL MEDICINE

## 2019-09-20 PROCEDURE — 85025 COMPLETE CBC W/AUTO DIFF WBC: CPT | Performed by: INTERNAL MEDICINE

## 2019-09-20 PROCEDURE — 83880 ASSAY OF NATRIURETIC PEPTIDE: CPT | Performed by: INTERNAL MEDICINE

## 2019-09-20 RX ORDER — LISINOPRIL 2.5 MG/1
2.5 TABLET ORAL NIGHTLY
Status: DISCONTINUED | OUTPATIENT
Start: 2019-09-20 | End: 2019-09-24 | Stop reason: HOSPADM

## 2019-09-20 RX ORDER — PANTOPRAZOLE SODIUM 40 MG/1
40 TABLET, DELAYED RELEASE ORAL
Status: DISCONTINUED | OUTPATIENT
Start: 2019-09-20 | End: 2019-09-24 | Stop reason: HOSPADM

## 2019-09-20 RX ADMIN — METOPROLOL TARTRATE 25 MG: 25 TABLET ORAL at 20:51

## 2019-09-20 RX ADMIN — SODIUM CHLORIDE, PRESERVATIVE FREE 10 ML: 5 INJECTION INTRAVENOUS at 20:52

## 2019-09-20 RX ADMIN — PANTOPRAZOLE SODIUM 40 MG: 40 INJECTION, POWDER, LYOPHILIZED, FOR SOLUTION INTRAVENOUS at 08:55

## 2019-09-20 RX ADMIN — LISINOPRIL 2.5 MG: 2.5 TABLET ORAL at 20:51

## 2019-09-20 RX ADMIN — IPRATROPIUM BROMIDE AND ALBUTEROL SULFATE 3 ML: 2.5; .5 SOLUTION RESPIRATORY (INHALATION) at 11:39

## 2019-09-20 RX ADMIN — METOPROLOL TARTRATE 25 MG: 25 TABLET ORAL at 08:55

## 2019-09-20 RX ADMIN — LISINOPRIL 2.5 MG: 2.5 TABLET ORAL at 08:55

## 2019-09-20 RX ADMIN — IPRATROPIUM BROMIDE AND ALBUTEROL SULFATE 3 ML: 2.5; .5 SOLUTION RESPIRATORY (INHALATION) at 08:16

## 2019-09-20 RX ADMIN — PANTOPRAZOLE SODIUM 40 MG: 40 TABLET, DELAYED RELEASE ORAL at 17:35

## 2019-09-20 RX ADMIN — ISOSORBIDE MONONITRATE 30 MG: 30 TABLET ORAL at 08:55

## 2019-09-20 RX ADMIN — SODIUM CHLORIDE, PRESERVATIVE FREE 10 ML: 5 INJECTION INTRAVENOUS at 08:56

## 2019-09-21 LAB
ALBUMIN SERPL-MCNC: 2.8 G/DL (ref 3.5–5.2)
ANION GAP SERPL CALCULATED.3IONS-SCNC: 11.2 MMOL/L (ref 5–15)
BASOPHILS # BLD AUTO: 0.05 10*3/MM3 (ref 0–0.2)
BASOPHILS NFR BLD AUTO: 0.7 % (ref 0–1.5)
BUN BLD-MCNC: 23 MG/DL (ref 8–23)
BUN/CREAT SERPL: 26.7 (ref 7–25)
CALCIUM SPEC-SCNC: 9.3 MG/DL (ref 8.2–9.6)
CHLORIDE SERPL-SCNC: 108 MMOL/L (ref 98–107)
CO2 SERPL-SCNC: 21.8 MMOL/L (ref 22–29)
CREAT BLD-MCNC: 0.86 MG/DL (ref 0.57–1)
DEPRECATED RDW RBC AUTO: 50.4 FL (ref 37–54)
EOSINOPHIL # BLD AUTO: 0.55 10*3/MM3 (ref 0–0.4)
EOSINOPHIL NFR BLD AUTO: 7.8 % (ref 0.3–6.2)
ERYTHROCYTE [DISTWIDTH] IN BLOOD BY AUTOMATED COUNT: 14.7 % (ref 12.3–15.4)
GFR SERPL CREATININE-BSD FRML MDRD: 61 ML/MIN/1.73
GLUCOSE BLD-MCNC: 79 MG/DL (ref 65–99)
HCT VFR BLD AUTO: 30.6 % (ref 34–46.6)
HGB BLD-MCNC: 9.7 G/DL (ref 12–15.9)
IMM GRANULOCYTES # BLD AUTO: 0.02 10*3/MM3 (ref 0–0.05)
IMM GRANULOCYTES NFR BLD AUTO: 0.3 % (ref 0–0.5)
LYMPHOCYTES # BLD AUTO: 1.11 10*3/MM3 (ref 0.7–3.1)
LYMPHOCYTES NFR BLD AUTO: 15.7 % (ref 19.6–45.3)
MCH RBC QN AUTO: 29.8 PG (ref 26.6–33)
MCHC RBC AUTO-ENTMCNC: 31.7 G/DL (ref 31.5–35.7)
MCV RBC AUTO: 93.9 FL (ref 79–97)
MONOCYTES # BLD AUTO: 0.57 10*3/MM3 (ref 0.1–0.9)
MONOCYTES NFR BLD AUTO: 8.1 % (ref 5–12)
NEUTROPHILS # BLD AUTO: 4.76 10*3/MM3 (ref 1.7–7)
NEUTROPHILS NFR BLD AUTO: 67.4 % (ref 42.7–76)
NRBC BLD AUTO-RTO: 0 /100 WBC (ref 0–0.2)
PHOSPHATE SERPL-MCNC: 2.8 MG/DL (ref 2.5–4.5)
PLATELET # BLD AUTO: 164 10*3/MM3 (ref 140–450)
PMV BLD AUTO: 10.3 FL (ref 6–12)
POTASSIUM BLD-SCNC: 4.3 MMOL/L (ref 3.5–5.2)
RBC # BLD AUTO: 3.26 10*6/MM3 (ref 3.77–5.28)
RETICS # AUTO: 0.09 10*6/MM3 (ref 0.02–0.13)
RETICS/RBC NFR AUTO: 2.9 % (ref 0.7–1.9)
SODIUM BLD-SCNC: 141 MMOL/L (ref 136–145)
WBC NRBC COR # BLD: 7.06 10*3/MM3 (ref 3.4–10.8)

## 2019-09-21 PROCEDURE — 99232 SBSQ HOSP IP/OBS MODERATE 35: CPT | Performed by: INTERNAL MEDICINE

## 2019-09-21 PROCEDURE — 97110 THERAPEUTIC EXERCISES: CPT

## 2019-09-21 PROCEDURE — 80069 RENAL FUNCTION PANEL: CPT | Performed by: INTERNAL MEDICINE

## 2019-09-21 PROCEDURE — 85045 AUTOMATED RETICULOCYTE COUNT: CPT | Performed by: INTERNAL MEDICINE

## 2019-09-21 PROCEDURE — 85025 COMPLETE CBC W/AUTO DIFF WBC: CPT | Performed by: INTERNAL MEDICINE

## 2019-09-21 RX ORDER — AMLODIPINE BESYLATE 5 MG/1
5 TABLET ORAL NIGHTLY
Status: DISCONTINUED | OUTPATIENT
Start: 2019-09-21 | End: 2019-09-23

## 2019-09-21 RX ORDER — LEVOTHYROXINE SODIUM 0.07 MG/1
75 TABLET ORAL
Status: DISCONTINUED | OUTPATIENT
Start: 2019-09-22 | End: 2019-09-24 | Stop reason: HOSPADM

## 2019-09-21 RX ADMIN — SODIUM CHLORIDE, PRESERVATIVE FREE 10 ML: 5 INJECTION INTRAVENOUS at 21:19

## 2019-09-21 RX ADMIN — LISINOPRIL 2.5 MG: 2.5 TABLET ORAL at 21:16

## 2019-09-21 RX ADMIN — PANTOPRAZOLE SODIUM 40 MG: 40 TABLET, DELAYED RELEASE ORAL at 17:36

## 2019-09-21 RX ADMIN — AMLODIPINE BESYLATE 5 MG: 5 TABLET ORAL at 21:17

## 2019-09-21 RX ADMIN — ISOSORBIDE MONONITRATE 30 MG: 30 TABLET ORAL at 08:42

## 2019-09-21 RX ADMIN — PANTOPRAZOLE SODIUM 40 MG: 40 TABLET, DELAYED RELEASE ORAL at 06:42

## 2019-09-21 RX ADMIN — METOPROLOL TARTRATE 25 MG: 25 TABLET ORAL at 08:42

## 2019-09-21 RX ADMIN — METOPROLOL TARTRATE 25 MG: 25 TABLET ORAL at 21:17

## 2019-09-22 LAB
ALBUMIN SERPL-MCNC: 2.5 G/DL (ref 3.5–5.2)
ANION GAP SERPL CALCULATED.3IONS-SCNC: 10.8 MMOL/L (ref 5–15)
ANISOCYTOSIS BLD QL: ABNORMAL
BASOPHILS # BLD MANUAL: 0.06 10*3/MM3 (ref 0–0.2)
BASOPHILS NFR BLD AUTO: 1.1 % (ref 0–1.5)
BUN BLD-MCNC: 24 MG/DL (ref 8–23)
BUN/CREAT SERPL: 26.7 (ref 7–25)
CALCIUM SPEC-SCNC: 9 MG/DL (ref 8.2–9.6)
CHLORIDE SERPL-SCNC: 104 MMOL/L (ref 98–107)
CO2 SERPL-SCNC: 22.2 MMOL/L (ref 22–29)
CREAT BLD-MCNC: 0.9 MG/DL (ref 0.57–1)
DEPRECATED RDW RBC AUTO: 49.9 FL (ref 37–54)
EOSINOPHIL # BLD MANUAL: 0.31 10*3/MM3 (ref 0–0.4)
EOSINOPHIL NFR BLD MANUAL: 5.3 % (ref 0.3–6.2)
ERYTHROCYTE [DISTWIDTH] IN BLOOD BY AUTOMATED COUNT: 14.5 % (ref 12.3–15.4)
FERRITIN SERPL-MCNC: 63 NG/ML (ref 13–150)
GFR SERPL CREATININE-BSD FRML MDRD: 58 ML/MIN/1.73
GLUCOSE BLD-MCNC: 86 MG/DL (ref 65–99)
H PYLORI AG STL QL IA: NEGATIVE
HCT VFR BLD AUTO: 28.5 % (ref 34–46.6)
HGB BLD-MCNC: 9.1 G/DL (ref 12–15.9)
IRON 24H UR-MRATE: 36 MCG/DL (ref 37–145)
IRON SATN MFR SERPL: 15 % (ref 20–50)
LYMPHOCYTES # BLD MANUAL: 0.86 10*3/MM3 (ref 0.7–3.1)
LYMPHOCYTES NFR BLD MANUAL: 14.7 % (ref 19.6–45.3)
LYMPHOCYTES NFR BLD MANUAL: 3.2 % (ref 5–12)
MCH RBC QN AUTO: 30.7 PG (ref 26.6–33)
MCHC RBC AUTO-ENTMCNC: 31.9 G/DL (ref 31.5–35.7)
MCV RBC AUTO: 96.3 FL (ref 79–97)
MONOCYTES # BLD AUTO: 0.19 10*3/MM3 (ref 0.1–0.9)
NEUTROPHILS # BLD AUTO: 4.46 10*3/MM3 (ref 1.7–7)
NEUTROPHILS NFR BLD MANUAL: 75.8 % (ref 42.7–76)
PHOSPHATE SERPL-MCNC: 2.6 MG/DL (ref 2.5–4.5)
PLAT MORPH BLD: NORMAL
PLATELET # BLD AUTO: 162 10*3/MM3 (ref 140–450)
PMV BLD AUTO: 11.1 FL (ref 6–12)
POIKILOCYTOSIS BLD QL SMEAR: ABNORMAL
POLYCHROMASIA BLD QL SMEAR: ABNORMAL
POTASSIUM BLD-SCNC: 3.9 MMOL/L (ref 3.5–5.2)
RBC # BLD AUTO: 2.96 10*6/MM3 (ref 3.77–5.28)
SODIUM BLD-SCNC: 137 MMOL/L (ref 136–145)
TIBC SERPL-MCNC: 235 MCG/DL (ref 298–536)
TRANSFERRIN SERPL-MCNC: 158 MG/DL (ref 200–360)
VIT B12 BLD-MCNC: 1015 PG/ML (ref 211–946)
WBC MORPH BLD: NORMAL
WBC NRBC COR # BLD: 5.88 10*3/MM3 (ref 3.4–10.8)

## 2019-09-22 PROCEDURE — 85007 BL SMEAR W/DIFF WBC COUNT: CPT | Performed by: INTERNAL MEDICINE

## 2019-09-22 PROCEDURE — 99232 SBSQ HOSP IP/OBS MODERATE 35: CPT | Performed by: INTERNAL MEDICINE

## 2019-09-22 PROCEDURE — 85025 COMPLETE CBC W/AUTO DIFF WBC: CPT | Performed by: INTERNAL MEDICINE

## 2019-09-22 PROCEDURE — 80069 RENAL FUNCTION PANEL: CPT | Performed by: INTERNAL MEDICINE

## 2019-09-22 PROCEDURE — 82728 ASSAY OF FERRITIN: CPT | Performed by: INTERNAL MEDICINE

## 2019-09-22 PROCEDURE — 83540 ASSAY OF IRON: CPT | Performed by: INTERNAL MEDICINE

## 2019-09-22 PROCEDURE — 25010000002 IRON SUCROSE PER 1 MG: Performed by: INTERNAL MEDICINE

## 2019-09-22 PROCEDURE — 82607 VITAMIN B-12: CPT | Performed by: INTERNAL MEDICINE

## 2019-09-22 PROCEDURE — 84466 ASSAY OF TRANSFERRIN: CPT | Performed by: INTERNAL MEDICINE

## 2019-09-22 RX ORDER — ACETAMINOPHEN 325 MG/1
650 TABLET ORAL ONCE
Status: COMPLETED | OUTPATIENT
Start: 2019-09-22 | End: 2019-09-22

## 2019-09-22 RX ADMIN — LEVOTHYROXINE SODIUM 75 MCG: 75 TABLET ORAL at 06:00

## 2019-09-22 RX ADMIN — PANTOPRAZOLE SODIUM 40 MG: 40 TABLET, DELAYED RELEASE ORAL at 06:00

## 2019-09-22 RX ADMIN — METOPROLOL TARTRATE 25 MG: 25 TABLET ORAL at 08:22

## 2019-09-22 RX ADMIN — IRON SUCROSE 300 MG: 20 INJECTION, SOLUTION INTRAVENOUS at 13:43

## 2019-09-22 RX ADMIN — PANTOPRAZOLE SODIUM 40 MG: 40 TABLET, DELAYED RELEASE ORAL at 17:54

## 2019-09-22 RX ADMIN — SODIUM CHLORIDE, PRESERVATIVE FREE 10 ML: 5 INJECTION INTRAVENOUS at 21:00

## 2019-09-22 RX ADMIN — ACETAMINOPHEN 650 MG: 325 TABLET, FILM COATED ORAL at 12:26

## 2019-09-22 RX ADMIN — SODIUM CHLORIDE, PRESERVATIVE FREE 10 ML: 5 INJECTION INTRAVENOUS at 21:01

## 2019-09-22 RX ADMIN — ISOSORBIDE MONONITRATE 30 MG: 30 TABLET ORAL at 08:22

## 2019-09-22 RX ADMIN — SODIUM CHLORIDE, PRESERVATIVE FREE 10 ML: 5 INJECTION INTRAVENOUS at 08:23

## 2019-09-22 RX ADMIN — AMLODIPINE BESYLATE 5 MG: 5 TABLET ORAL at 21:24

## 2019-09-23 LAB
ALBUMIN SERPL-MCNC: 2.6 G/DL (ref 3.5–5.2)
ANION GAP SERPL CALCULATED.3IONS-SCNC: 10.6 MMOL/L (ref 5–15)
BASOPHILS # BLD AUTO: 0.06 10*3/MM3 (ref 0–0.2)
BASOPHILS NFR BLD AUTO: 1.2 % (ref 0–1.5)
BUN BLD-MCNC: 24 MG/DL (ref 8–23)
BUN/CREAT SERPL: 25.3 (ref 7–25)
CALCIUM SPEC-SCNC: 8.9 MG/DL (ref 8.2–9.6)
CHLORIDE SERPL-SCNC: 106 MMOL/L (ref 98–107)
CO2 SERPL-SCNC: 21.4 MMOL/L (ref 22–29)
CREAT BLD-MCNC: 0.95 MG/DL (ref 0.57–1)
DEPRECATED RDW RBC AUTO: 52.4 FL (ref 37–54)
EOSINOPHIL # BLD AUTO: 0.4 10*3/MM3 (ref 0–0.4)
EOSINOPHIL NFR BLD AUTO: 7.8 % (ref 0.3–6.2)
ERYTHROCYTE [DISTWIDTH] IN BLOOD BY AUTOMATED COUNT: 15 % (ref 12.3–15.4)
GFR SERPL CREATININE-BSD FRML MDRD: 54 ML/MIN/1.73
GLUCOSE BLD-MCNC: 83 MG/DL (ref 65–99)
HCT VFR BLD AUTO: 29.7 % (ref 34–46.6)
HGB BLD-MCNC: 9.3 G/DL (ref 12–15.9)
IMM GRANULOCYTES # BLD AUTO: 0.02 10*3/MM3 (ref 0–0.05)
IMM GRANULOCYTES NFR BLD AUTO: 0.4 % (ref 0–0.5)
LYMPHOCYTES # BLD AUTO: 1.37 10*3/MM3 (ref 0.7–3.1)
LYMPHOCYTES NFR BLD AUTO: 26.9 % (ref 19.6–45.3)
MCH RBC QN AUTO: 29.5 PG (ref 26.6–33)
MCHC RBC AUTO-ENTMCNC: 31.3 G/DL (ref 31.5–35.7)
MCV RBC AUTO: 94.3 FL (ref 79–97)
MONOCYTES # BLD AUTO: 0.43 10*3/MM3 (ref 0.1–0.9)
MONOCYTES NFR BLD AUTO: 8.4 % (ref 5–12)
NEUTROPHILS # BLD AUTO: 2.82 10*3/MM3 (ref 1.7–7)
NEUTROPHILS NFR BLD AUTO: 55.3 % (ref 42.7–76)
NRBC BLD AUTO-RTO: 0 /100 WBC (ref 0–0.2)
PHOSPHATE SERPL-MCNC: 2.8 MG/DL (ref 2.5–4.5)
PLATELET # BLD AUTO: 174 10*3/MM3 (ref 140–450)
PMV BLD AUTO: 10.4 FL (ref 6–12)
POTASSIUM BLD-SCNC: 3.9 MMOL/L (ref 3.5–5.2)
RBC # BLD AUTO: 3.15 10*6/MM3 (ref 3.77–5.28)
SODIUM BLD-SCNC: 138 MMOL/L (ref 136–145)
WBC NRBC COR # BLD: 5.1 10*3/MM3 (ref 3.4–10.8)

## 2019-09-23 PROCEDURE — 80069 RENAL FUNCTION PANEL: CPT | Performed by: INTERNAL MEDICINE

## 2019-09-23 PROCEDURE — 82747 ASSAY OF FOLIC ACID RBC: CPT | Performed by: INTERNAL MEDICINE

## 2019-09-23 PROCEDURE — 85014 HEMATOCRIT: CPT | Performed by: INTERNAL MEDICINE

## 2019-09-23 PROCEDURE — 97110 THERAPEUTIC EXERCISES: CPT

## 2019-09-23 PROCEDURE — 85025 COMPLETE CBC W/AUTO DIFF WBC: CPT | Performed by: INTERNAL MEDICINE

## 2019-09-23 PROCEDURE — 99232 SBSQ HOSP IP/OBS MODERATE 35: CPT | Performed by: INTERNAL MEDICINE

## 2019-09-23 RX ORDER — AMLODIPINE BESYLATE 2.5 MG/1
2.5 TABLET ORAL NIGHTLY
Status: DISCONTINUED | OUTPATIENT
Start: 2019-09-23 | End: 2019-09-24 | Stop reason: HOSPADM

## 2019-09-23 RX ADMIN — METOPROLOL TARTRATE 25 MG: 25 TABLET ORAL at 09:30

## 2019-09-23 RX ADMIN — METOPROLOL TARTRATE 12.5 MG: 25 TABLET ORAL at 21:05

## 2019-09-23 RX ADMIN — ISOSORBIDE MONONITRATE 30 MG: 30 TABLET ORAL at 09:30

## 2019-09-23 RX ADMIN — AMLODIPINE BESYLATE 2.5 MG: 2.5 TABLET ORAL at 21:04

## 2019-09-23 RX ADMIN — SODIUM CHLORIDE, PRESERVATIVE FREE 10 ML: 5 INJECTION INTRAVENOUS at 09:31

## 2019-09-23 RX ADMIN — PANTOPRAZOLE SODIUM 40 MG: 40 TABLET, DELAYED RELEASE ORAL at 18:20

## 2019-09-23 RX ADMIN — PANTOPRAZOLE SODIUM 40 MG: 40 TABLET, DELAYED RELEASE ORAL at 06:46

## 2019-09-23 RX ADMIN — SODIUM CHLORIDE, PRESERVATIVE FREE 10 ML: 5 INJECTION INTRAVENOUS at 21:05

## 2019-09-23 RX ADMIN — LEVOTHYROXINE SODIUM 75 MCG: 75 TABLET ORAL at 06:46

## 2019-09-23 RX ADMIN — LISINOPRIL 2.5 MG: 2.5 TABLET ORAL at 21:04

## 2019-09-24 VITALS
SYSTOLIC BLOOD PRESSURE: 143 MMHG | BODY MASS INDEX: 17.46 KG/M2 | OXYGEN SATURATION: 99 % | HEIGHT: 63 IN | RESPIRATION RATE: 16 BRPM | WEIGHT: 98.55 LBS | DIASTOLIC BLOOD PRESSURE: 69 MMHG | HEART RATE: 58 BPM | TEMPERATURE: 97.5 F

## 2019-09-24 LAB
ALBUMIN SERPL-MCNC: 2.6 G/DL (ref 3.5–5.2)
ANION GAP SERPL CALCULATED.3IONS-SCNC: 8.8 MMOL/L (ref 5–15)
BASOPHILS # BLD AUTO: 0.04 10*3/MM3 (ref 0–0.2)
BASOPHILS NFR BLD AUTO: 0.8 % (ref 0–1.5)
BUN BLD-MCNC: 21 MG/DL (ref 8–23)
BUN/CREAT SERPL: 25 (ref 7–25)
CALCIUM SPEC-SCNC: 9 MG/DL (ref 8.2–9.6)
CHLORIDE SERPL-SCNC: 104 MMOL/L (ref 98–107)
CO2 SERPL-SCNC: 25.2 MMOL/L (ref 22–29)
CREAT BLD-MCNC: 0.84 MG/DL (ref 0.57–1)
DEPRECATED RDW RBC AUTO: 50.2 FL (ref 37–54)
EOSINOPHIL # BLD AUTO: 0.32 10*3/MM3 (ref 0–0.4)
EOSINOPHIL NFR BLD AUTO: 6.1 % (ref 0.3–6.2)
ERYTHROCYTE [DISTWIDTH] IN BLOOD BY AUTOMATED COUNT: 14.5 % (ref 12.3–15.4)
FOLATE BLD-MCNC: 554 NG/ML
FOLATE RBC-MCNC: 1865 NG/ML
GFR SERPL CREATININE-BSD FRML MDRD: 63 ML/MIN/1.73
GLUCOSE BLD-MCNC: 89 MG/DL (ref 65–99)
HCT VFR BLD AUTO: 28.7 % (ref 34–46.6)
HCT VFR BLD AUTO: 29.7 % (ref 34–46.6)
HGB BLD-MCNC: 9.2 G/DL (ref 12–15.9)
IMM GRANULOCYTES # BLD AUTO: 0.02 10*3/MM3 (ref 0–0.05)
IMM GRANULOCYTES NFR BLD AUTO: 0.4 % (ref 0–0.5)
LYMPHOCYTES # BLD AUTO: 1.25 10*3/MM3 (ref 0.7–3.1)
LYMPHOCYTES NFR BLD AUTO: 23.9 % (ref 19.6–45.3)
MCH RBC QN AUTO: 30.3 PG (ref 26.6–33)
MCHC RBC AUTO-ENTMCNC: 32.1 G/DL (ref 31.5–35.7)
MCV RBC AUTO: 94.4 FL (ref 79–97)
MONOCYTES # BLD AUTO: 0.55 10*3/MM3 (ref 0.1–0.9)
MONOCYTES NFR BLD AUTO: 10.5 % (ref 5–12)
NEUTROPHILS # BLD AUTO: 3.04 10*3/MM3 (ref 1.7–7)
NEUTROPHILS NFR BLD AUTO: 58.3 % (ref 42.7–76)
NRBC BLD AUTO-RTO: 0.2 /100 WBC (ref 0–0.2)
PHOSPHATE SERPL-MCNC: 2.6 MG/DL (ref 2.5–4.5)
PLATELET # BLD AUTO: 183 10*3/MM3 (ref 140–450)
PMV BLD AUTO: 10.8 FL (ref 6–12)
POTASSIUM BLD-SCNC: 3.9 MMOL/L (ref 3.5–5.2)
RBC # BLD AUTO: 3.04 10*6/MM3 (ref 3.77–5.28)
SODIUM BLD-SCNC: 138 MMOL/L (ref 136–145)
WBC NRBC COR # BLD: 5.22 10*3/MM3 (ref 3.4–10.8)

## 2019-09-24 PROCEDURE — 99232 SBSQ HOSP IP/OBS MODERATE 35: CPT | Performed by: INTERNAL MEDICINE

## 2019-09-24 PROCEDURE — 80069 RENAL FUNCTION PANEL: CPT | Performed by: INTERNAL MEDICINE

## 2019-09-24 PROCEDURE — 85025 COMPLETE CBC W/AUTO DIFF WBC: CPT | Performed by: INTERNAL MEDICINE

## 2019-09-24 RX ORDER — AMLODIPINE BESYLATE 2.5 MG/1
2.5 TABLET ORAL NIGHTLY
Qty: 30 TABLET | Refills: 0 | Status: SHIPPED | OUTPATIENT
Start: 2019-09-24 | End: 2020-11-05

## 2019-09-24 RX ORDER — LISINOPRIL 2.5 MG/1
2.5 TABLET ORAL NIGHTLY
Qty: 30 TABLET | Refills: 0 | Status: SHIPPED | OUTPATIENT
Start: 2019-09-24 | End: 2020-11-05

## 2019-09-24 RX ADMIN — ISOSORBIDE MONONITRATE 30 MG: 30 TABLET ORAL at 10:00

## 2019-09-24 RX ADMIN — PANTOPRAZOLE SODIUM 40 MG: 40 TABLET, DELAYED RELEASE ORAL at 06:58

## 2019-09-24 RX ADMIN — LEVOTHYROXINE SODIUM 75 MCG: 75 TABLET ORAL at 06:58

## 2019-09-24 RX ADMIN — METOPROLOL TARTRATE 12.5 MG: 25 TABLET ORAL at 10:00

## 2019-09-24 RX ADMIN — SODIUM CHLORIDE, PRESERVATIVE FREE 10 ML: 5 INJECTION INTRAVENOUS at 10:00

## 2019-09-25 ENCOUNTER — READMISSION MANAGEMENT (OUTPATIENT)
Dept: CALL CENTER | Facility: HOSPITAL | Age: 84
End: 2019-09-25

## 2019-09-25 NOTE — OUTREACH NOTE
Prep Survey      Responses   Facility patient discharged from?  [place]   Is patient eligible?  Yes   Discharge diagnosis  Acute blood loss anemia   Does the patient have one of the following disease processes/diagnoses(primary or secondary)?  Other   Does the patient have Home health ordered?  Yes   What is the Home health agency?   [facility]    Is there a DME ordered?  No   Medication alerts for this patient  Stop aspirin, benicar and Plavix    Prep survey completed?  Yes          Aleta Terrell LPN

## 2019-09-28 ENCOUNTER — READMISSION MANAGEMENT (OUTPATIENT)
Dept: CALL CENTER | Facility: HOSPITAL | Age: 84
End: 2019-09-28

## 2019-09-28 NOTE — OUTREACH NOTE
Medical Week 1 Survey      Responses   Facility patient discharged from?  [place]   Does the patient have one of the following disease processes/diagnoses(primary or secondary)?  Other   Is there a successful TCM telephone encounter documented?  No   Week 1 attempt successful?  Yes   Call start time  1028   Call end time  1029   Discharge diagnosis  Acute blood loss anemia   Is patient permission given to speak with other caregiver?  Yes   Meds reviewed with patient/caregiver?  Yes   Is the patient having any side effects they believe may be caused by any medication additions or changes?  No   Does the patient have all medications ordered at discharge?  Yes   Is the patient taking all medications as directed (includes completed medication regime)?  Yes   Does the patient have a primary care provider?   Yes   Does the patient have an appointment with their PCP within 7 days of discharge?  Yes   Has the patient kept scheduled appointments due by today?  Yes   What is the Home health agency?   [facility]    Has home health visited the patient within 72 hours of discharge?  Yes   Psychosocial issues?  No   Comments  Doing well. HH coming over. Will see MD. No issues with meds.   Did the patient receive a copy of their discharge instructions?  Yes   Nursing interventions  Reviewed instructions with patient   What is the patient's perception of their health status since discharge?  Returned to baseline/stable   Is the patient/caregiver able to teach back signs and symptoms related to disease process for when to call PCP?  Yes   Is the patient/caregiver able to teach back signs and symptoms related to disease process for when to call 911?  Yes   Is the patient/caregiver able to teach back the hierarchy of who to call/visit for symptoms/problems? PCP, Specialist, Home health nurse, Urgent Care, ED, 911  Yes   Week 1 call completed?  Yes          Ron Boyer RN

## 2019-10-07 ENCOUNTER — READMISSION MANAGEMENT (OUTPATIENT)
Dept: CALL CENTER | Facility: HOSPITAL | Age: 84
End: 2019-10-07

## 2019-10-07 NOTE — OUTREACH NOTE
Medical Week 2 Survey      Responses   Facility patient discharged from?  [place]   Does the patient have one of the following disease processes/diagnoses(primary or secondary)?  Other   Week 2 attempt successful?  Yes   Call start time  1230   Rescheduled  Rescheduled-pt requested [Patient is sleeping.]   Call end time  1233          Valencia Aguilar RN

## 2019-10-10 ENCOUNTER — READMISSION MANAGEMENT (OUTPATIENT)
Dept: CALL CENTER | Facility: HOSPITAL | Age: 84
End: 2019-10-10

## 2019-10-10 NOTE — OUTREACH NOTE
Medical Week 2 Survey      Responses   Facility patient discharged from?  [place]   Does the patient have one of the following disease processes/diagnoses(primary or secondary)?  Other   Week 2 attempt successful?  Yes   Call start time  0904   Discharge diagnosis  Acute blood loss anemia   Call end time  0912   Is patient permission given to speak with other caregiver?  Yes   List who call center can speak with  DaughterMarisa   Person spoke with today (if not patient) and relationship  Marisa   Medication alerts for this patient  Stop aspirin, benicar and Plavix    Meds reviewed with patient/caregiver?  Yes   Is the patient taking all medications as directed (includes completed medication regime)?  Yes   Medication comments  Gave BP meds and Bp 151/93 and her head hurts.  Now 130/89 after meds kicked in.    Has the patient kept scheduled appointments due by today?  Yes   What is the Home health agency?   [facility]    Has home health visited the patient within 72 hours of discharge?  Yes   Home health comments   visited yesterday BP was 141/74   Psychosocial issues?  No   What is the patient's perception of their health status since discharge?  Same   Week 2 Call Completed?  Yes   Wrap up additional comments  She woke up early with headache and BP elevated 150's. Gave her breakfast and took her meds and came down to 130/89. Drinking an ensure and sitting in her chair. Still has headache. Will monitor and call back as needed. Suggested a tylenol since she has had food, drink and BP medication. She is not having any stroke symptoms.           Sheba White RN

## 2019-10-14 ENCOUNTER — LAB REQUISITION (OUTPATIENT)
Dept: LAB | Facility: HOSPITAL | Age: 84
End: 2019-10-14

## 2019-10-14 DIAGNOSIS — Z00.00 ENCOUNTER FOR GENERAL ADULT MEDICAL EXAMINATION WITHOUT ABNORMAL FINDINGS: ICD-10-CM

## 2019-10-14 LAB
ANISOCYTOSIS BLD QL: ABNORMAL
BASOPHILS # BLD MANUAL: 0.06 10*3/MM3 (ref 0–0.2)
BASOPHILS NFR BLD AUTO: 1 % (ref 0–1.5)
DEPRECATED RDW RBC AUTO: 56.1 FL (ref 37–54)
ERYTHROCYTE [DISTWIDTH] IN BLOOD BY AUTOMATED COUNT: 15.6 % (ref 12.3–15.4)
HCT VFR BLD AUTO: 32.3 % (ref 34–46.6)
HGB BLD-MCNC: 10.3 G/DL (ref 12–15.9)
LYMPHOCYTES # BLD MANUAL: 0.85 10*3/MM3 (ref 0.7–3.1)
LYMPHOCYTES NFR BLD MANUAL: 13.4 % (ref 19.6–45.3)
LYMPHOCYTES NFR BLD MANUAL: 4.1 % (ref 5–12)
MCH RBC QN AUTO: 31.2 PG (ref 26.6–33)
MCHC RBC AUTO-ENTMCNC: 31.9 G/DL (ref 31.5–35.7)
MCV RBC AUTO: 97.9 FL (ref 79–97)
MONOCYTES # BLD AUTO: 0.26 10*3/MM3 (ref 0.1–0.9)
NEUTROPHILS # BLD AUTO: 5.14 10*3/MM3 (ref 1.7–7)
NEUTROPHILS NFR BLD MANUAL: 81.4 % (ref 42.7–76)
PLAT MORPH BLD: NORMAL
PLATELET # BLD AUTO: 175 10*3/MM3 (ref 140–450)
PMV BLD AUTO: 10.7 FL (ref 6–12)
POIKILOCYTOSIS BLD QL SMEAR: ABNORMAL
RBC # BLD AUTO: 3.3 10*6/MM3 (ref 3.77–5.28)
WBC MORPH BLD: NORMAL
WBC NRBC COR # BLD: 6.32 10*3/MM3 (ref 3.4–10.8)

## 2019-10-14 PROCEDURE — 85007 BL SMEAR W/DIFF WBC COUNT: CPT | Performed by: INTERNAL MEDICINE

## 2019-10-14 PROCEDURE — 85027 COMPLETE CBC AUTOMATED: CPT | Performed by: INTERNAL MEDICINE

## 2019-10-18 ENCOUNTER — READMISSION MANAGEMENT (OUTPATIENT)
Dept: CALL CENTER | Facility: HOSPITAL | Age: 84
End: 2019-10-18

## 2019-10-18 NOTE — OUTREACH NOTE
Medical Week 3 Survey      Responses   Facility patient discharged from?  [place]   Does the patient have one of the following disease processes/diagnoses(primary or secondary)?  Other   Week 3 attempt successful?  Yes   Call start time  1805   Call end time  1807   Discharge diagnosis  Acute blood loss anemia   Is patient permission given to speak with other caregiver?  Yes   Person spoke with today (if not patient) and relationship  Marisa Stephens reviewed with patient/caregiver?  Yes   Is the patient having any side effects they believe may be caused by any medication additions or changes?  No   Does the patient have all medications ordered at discharge?  Yes   Is the patient taking all medications as directed (includes completed medication regime)?  Yes   Does the patient have a primary care provider?   Yes   Does the patient have an appointment with their PCP within 7 days of discharge?  Yes   Has the patient kept scheduled appointments due by today?  Yes   What is the Home health agency?   [facility]    Has home health visited the patient within 72 hours of discharge?  Yes   Psychosocial issues?  No   Did the patient receive a copy of their discharge instructions?  Yes   Nursing interventions  Reviewed instructions with patient   What is the patient's perception of their health status since discharge?  Improving   Is the patient/caregiver able to teach back signs and symptoms related to disease process for when to call PCP?  Yes   Is the patient/caregiver able to teach back signs and symptoms related to disease process for when to call 911?  Yes   Is the patient/caregiver able to teach back the hierarchy of who to call/visit for symptoms/problems? PCP, Specialist, Home health nurse, Urgent Care, ED, 911  Yes   Additional teach back comments  KATHLEEN is still coming, she is eating well, sleeping sort of ok, doing pretty good.   Week 3 Call Completed?  Yes          Karolina Mcdonald RN

## 2019-10-26 ENCOUNTER — READMISSION MANAGEMENT (OUTPATIENT)
Dept: CALL CENTER | Facility: HOSPITAL | Age: 84
End: 2019-10-26

## 2019-10-26 NOTE — OUTREACH NOTE
Medical Week 4 Survey      Responses   Facility patient discharged from?  [place]   Does the patient have one of the following disease processes/diagnoses(primary or secondary)?  Other   Week 4 attempt successful?  No          Vanessa Orellana RN

## 2020-11-05 ENCOUNTER — APPOINTMENT (OUTPATIENT)
Dept: GENERAL RADIOLOGY | Facility: HOSPITAL | Age: 85
End: 2020-11-05

## 2020-11-05 ENCOUNTER — HOSPITAL ENCOUNTER (INPATIENT)
Facility: HOSPITAL | Age: 85
LOS: 2 days | Discharge: HOME HEALTH | End: 2020-11-07
Attending: EMERGENCY MEDICINE | Admitting: HOSPITALIST

## 2020-11-05 DIAGNOSIS — I50.31 ACUTE DIASTOLIC CHF (CONGESTIVE HEART FAILURE) (HCC): Primary | ICD-10-CM

## 2020-11-05 DIAGNOSIS — I25.10 CORONARY ARTERY DISEASE INVOLVING NATIVE HEART, ANGINA PRESENCE UNSPECIFIED, UNSPECIFIED VESSEL OR LESION TYPE: ICD-10-CM

## 2020-11-05 DIAGNOSIS — R54 ADVANCED AGE: ICD-10-CM

## 2020-11-05 LAB
ALBUMIN SERPL-MCNC: 2.6 G/DL (ref 3.5–5.2)
ALBUMIN/GLOB SERPL: 1 G/DL
ALP SERPL-CCNC: 104 U/L (ref 39–117)
ALT SERPL W P-5'-P-CCNC: 21 U/L (ref 1–33)
ANION GAP SERPL CALCULATED.3IONS-SCNC: 16 MMOL/L (ref 5–15)
AST SERPL-CCNC: 34 U/L (ref 1–32)
B PARAPERT DNA SPEC QL NAA+PROBE: NOT DETECTED
B PERT DNA SPEC QL NAA+PROBE: NOT DETECTED
BILIRUB SERPL-MCNC: 0.5 MG/DL (ref 0–1.2)
BUN SERPL-MCNC: 38 MG/DL (ref 8–23)
BUN/CREAT SERPL: 29.9 (ref 7–25)
BURR CELLS BLD QL SMEAR: ABNORMAL
C PNEUM DNA NPH QL NAA+NON-PROBE: NOT DETECTED
CALCIUM SPEC-SCNC: 8.5 MG/DL (ref 8.2–9.6)
CHLORIDE SERPL-SCNC: 101 MMOL/L (ref 98–107)
CO2 SERPL-SCNC: 20 MMOL/L (ref 22–29)
CREAT SERPL-MCNC: 1.27 MG/DL (ref 0.57–1)
CRP SERPL-MCNC: 5.76 MG/DL (ref 0–0.5)
D-LACTATE SERPL-SCNC: 1.6 MMOL/L (ref 0.5–2)
DEPRECATED RDW RBC AUTO: 62.5 FL (ref 37–54)
ERYTHROCYTE [DISTWIDTH] IN BLOOD BY AUTOMATED COUNT: 14.4 % (ref 12.3–15.4)
FERRITIN SERPL-MCNC: 446 NG/ML (ref 13–150)
FLUAV SUBTYP SPEC NAA+PROBE: NOT DETECTED
FLUBV RNA ISLT QL NAA+PROBE: NOT DETECTED
GFR SERPL CREATININE-BSD FRML MDRD: 39 ML/MIN/1.73
GLOBULIN UR ELPH-MCNC: 2.5 GM/DL
GLUCOSE SERPL-MCNC: 99 MG/DL (ref 65–99)
HADV DNA SPEC NAA+PROBE: NOT DETECTED
HCOV 229E RNA SPEC QL NAA+PROBE: NOT DETECTED
HCOV HKU1 RNA SPEC QL NAA+PROBE: NOT DETECTED
HCOV NL63 RNA SPEC QL NAA+PROBE: NOT DETECTED
HCOV OC43 RNA SPEC QL NAA+PROBE: NOT DETECTED
HCT VFR BLD AUTO: 40.3 % (ref 34–46.6)
HGB BLD-MCNC: 11.6 G/DL (ref 12–15.9)
HMPV RNA NPH QL NAA+NON-PROBE: NOT DETECTED
HPIV1 RNA SPEC QL NAA+PROBE: NOT DETECTED
HPIV2 RNA SPEC QL NAA+PROBE: NOT DETECTED
HPIV3 RNA NPH QL NAA+PROBE: NOT DETECTED
HPIV4 P GENE NPH QL NAA+PROBE: NOT DETECTED
LDH SERPL-CCNC: 339 U/L (ref 135–214)
LYMPHOCYTES # BLD MANUAL: 0.1 10*3/MM3 (ref 0.7–3.1)
LYMPHOCYTES NFR BLD MANUAL: 1 % (ref 19.6–45.3)
LYMPHOCYTES NFR BLD MANUAL: 2 % (ref 5–12)
M PNEUMO IGG SER IA-ACNC: NOT DETECTED
MCH RBC QN AUTO: 33.6 PG (ref 26.6–33)
MCHC RBC AUTO-ENTMCNC: 28.8 G/DL (ref 31.5–35.7)
MCV RBC AUTO: 116.8 FL (ref 79–97)
MONOCYTES # BLD AUTO: 0.21 10*3/MM3 (ref 0.1–0.9)
NEUTROPHILS # BLD AUTO: 10.11 10*3/MM3 (ref 1.7–7)
NEUTROPHILS NFR BLD MANUAL: 97 % (ref 42.7–76)
NT-PROBNP SERPL-MCNC: 8731 PG/ML (ref 0–1800)
PLAT MORPH BLD: NORMAL
PLATELET # BLD AUTO: 107 10*3/MM3 (ref 140–450)
PMV BLD AUTO: 11.9 FL (ref 6–12)
POIKILOCYTOSIS BLD QL SMEAR: ABNORMAL
POTASSIUM SERPL-SCNC: 4 MMOL/L (ref 3.5–5.2)
PROCALCITONIN SERPL-MCNC: 0.16 NG/ML (ref 0–0.25)
PROT SERPL-MCNC: 5.1 G/DL (ref 6–8.5)
QT INTERVAL: 413 MS
RBC # BLD AUTO: 3.45 10*6/MM3 (ref 3.77–5.28)
RHINOVIRUS RNA SPEC NAA+PROBE: NOT DETECTED
RSV RNA NPH QL NAA+NON-PROBE: NOT DETECTED
SARS-COV-2 RNA NPH QL NAA+NON-PROBE: NOT DETECTED
SODIUM SERPL-SCNC: 137 MMOL/L (ref 136–145)
TROPONIN T SERPL-MCNC: 0.06 NG/ML (ref 0–0.03)
WBC # BLD AUTO: 10.42 10*3/MM3 (ref 3.4–10.8)
WBC MORPH BLD: NORMAL

## 2020-11-05 PROCEDURE — 80053 COMPREHEN METABOLIC PANEL: CPT | Performed by: EMERGENCY MEDICINE

## 2020-11-05 PROCEDURE — 25010000002 FUROSEMIDE PER 20 MG: Performed by: EMERGENCY MEDICINE

## 2020-11-05 PROCEDURE — 86140 C-REACTIVE PROTEIN: CPT | Performed by: EMERGENCY MEDICINE

## 2020-11-05 PROCEDURE — 82728 ASSAY OF FERRITIN: CPT | Performed by: EMERGENCY MEDICINE

## 2020-11-05 PROCEDURE — 83880 ASSAY OF NATRIURETIC PEPTIDE: CPT | Performed by: EMERGENCY MEDICINE

## 2020-11-05 PROCEDURE — 83615 LACTATE (LD) (LDH) ENZYME: CPT | Performed by: EMERGENCY MEDICINE

## 2020-11-05 PROCEDURE — 36415 COLL VENOUS BLD VENIPUNCTURE: CPT

## 2020-11-05 PROCEDURE — 85025 COMPLETE CBC W/AUTO DIFF WBC: CPT | Performed by: EMERGENCY MEDICINE

## 2020-11-05 PROCEDURE — 93005 ELECTROCARDIOGRAM TRACING: CPT | Performed by: EMERGENCY MEDICINE

## 2020-11-05 PROCEDURE — 0202U NFCT DS 22 TRGT SARS-COV-2: CPT | Performed by: EMERGENCY MEDICINE

## 2020-11-05 PROCEDURE — 93010 ELECTROCARDIOGRAM REPORT: CPT | Performed by: INTERNAL MEDICINE

## 2020-11-05 PROCEDURE — 85007 BL SMEAR W/DIFF WBC COUNT: CPT | Performed by: EMERGENCY MEDICINE

## 2020-11-05 PROCEDURE — 83605 ASSAY OF LACTIC ACID: CPT | Performed by: EMERGENCY MEDICINE

## 2020-11-05 PROCEDURE — 84145 PROCALCITONIN (PCT): CPT | Performed by: EMERGENCY MEDICINE

## 2020-11-05 PROCEDURE — 71045 X-RAY EXAM CHEST 1 VIEW: CPT

## 2020-11-05 PROCEDURE — 99285 EMERGENCY DEPT VISIT HI MDM: CPT

## 2020-11-05 PROCEDURE — 84484 ASSAY OF TROPONIN QUANT: CPT | Performed by: EMERGENCY MEDICINE

## 2020-11-05 RX ORDER — ISOSORBIDE DINITRATE 30 MG/1
30 TABLET ORAL 4 TIMES DAILY
COMMUNITY
End: 2020-11-05

## 2020-11-05 RX ORDER — FUROSEMIDE 10 MG/ML
40 INJECTION INTRAMUSCULAR; INTRAVENOUS EVERY 12 HOURS
Status: DISCONTINUED | OUTPATIENT
Start: 2020-11-06 | End: 2020-11-07

## 2020-11-05 RX ORDER — SODIUM CHLORIDE 0.9 % (FLUSH) 0.9 %
10 SYRINGE (ML) INJECTION AS NEEDED
Status: DISCONTINUED | OUTPATIENT
Start: 2020-11-05 | End: 2020-11-07 | Stop reason: HOSPADM

## 2020-11-05 RX ORDER — ASPIRIN 81 MG/1
81 TABLET, CHEWABLE ORAL DAILY
COMMUNITY

## 2020-11-05 RX ORDER — FUROSEMIDE 20 MG/1
20 TABLET ORAL DAILY
COMMUNITY
End: 2020-11-07 | Stop reason: HOSPADM

## 2020-11-05 RX ORDER — MEGESTROL ACETATE 40 MG/1
40 TABLET ORAL DAILY
COMMUNITY

## 2020-11-05 RX ORDER — LEVOTHYROXINE SODIUM 0.07 MG/1
75 TABLET ORAL DAILY
COMMUNITY

## 2020-11-05 RX ORDER — ISOSORBIDE MONONITRATE 30 MG/1
30 TABLET, EXTENDED RELEASE ORAL DAILY
COMMUNITY

## 2020-11-05 RX ORDER — FUROSEMIDE 10 MG/ML
20 INJECTION INTRAMUSCULAR; INTRAVENOUS ONCE
Status: COMPLETED | OUTPATIENT
Start: 2020-11-05 | End: 2020-11-05

## 2020-11-05 RX ADMIN — FUROSEMIDE 20 MG: 20 INJECTION, SOLUTION INTRAMUSCULAR; INTRAVENOUS at 19:11

## 2020-11-05 RX ADMIN — SODIUM CHLORIDE, PRESERVATIVE FREE 10 ML: 5 INJECTION INTRAVENOUS at 16:19

## 2020-11-05 NOTE — ED PROVIDER NOTES
EMERGENCY DEPARTMENT ENCOUNTER    Room Number:  24/24  Date of encounter:  11/5/2020  PCP: Bartolo Gardner MD  Historian: Patient      HPI:  Chief Complaint: Chest pain and shortness of breath  A complete HPI/ROS/PMH/PSH/SH/FH are unobtainable due to: She is a poor historian    Context: Dinah Mcnamara is a 89 y.o. female who presents to the ED with reports of chest pain or shortness of breath.  However, she denied this when I initially questioned her.  She does have a loose cough that is productive of copious amount of clearish sputum that we suctioned.  She is a very poor historian-she seems confused and is hard of hearing.      The patient was placed in a mask in triage, hand hygiene was performed before and after my interaction with the patient.  I wore a mask, safety glasses and gloves during my entire interaction with the patient.    PAST MEDICAL HISTORY  Active Ambulatory Problems     Diagnosis Date Noted   • Acute gallstone pancreatitis 05/12/2016   • SIRS (systemic inflammatory response syndrome) (CMS/HCC) 05/13/2016   • Colitis 05/13/2016   • Hypokalemia 05/13/2016   • Dehydration 05/13/2016   • CAD (coronary artery disease) 05/13/2016   • HTN (hypertension) 05/13/2016   • Essential hypertension 02/26/2017   • Pneumonia of right lower lobe due to infectious organism 09/01/2019   • Rectal bleeding 09/17/2019   • Severe anemia 09/17/2019     Resolved Ambulatory Problems     Diagnosis Date Noted   • No Resolved Ambulatory Problems     Past Medical History:   Diagnosis Date   • Blindness    • Diverticulitis    • Hypertension    • Pancreatitis    • Stroke (CMS/HCC)          PAST SURGICAL HISTORY  Past Surgical History:   Procedure Laterality Date   • CARDIAC SURGERY     • CHOLECYSTECTOMY     • CORONARY ANGIOPLASTY WITH STENT PLACEMENT     • SHOULDER SURGERY           FAMILY HISTORY  History reviewed. No pertinent family history.      SOCIAL HISTORY  Social History     Socioeconomic History   • Marital  status:      Spouse name: Not on file   • Number of children: Not on file   • Years of education: Not on file   • Highest education level: Not on file   Tobacco Use   • Smoking status: Never Smoker   Substance and Sexual Activity   • Alcohol use: No     Alcohol/week: 1.0 standard drinks     Types: 1 Glasses of wine per week     Frequency: Never     Comment: holidays   • Drug use: No   • Sexual activity: Never         ALLERGIES  Augmentin [amoxicillin-pot clavulanate], Codeine, Hydralazine hcl, and Sulfa antibiotics        REVIEW OF SYSTEMS  Review of Systems   Unable to perform ROS: Age          PHYSICAL EXAM    I have reviewed the triage vital signs and nursing notes.    ED Triage Vitals [11/05/20 1536]   Temp Heart Rate Resp BP SpO2   97.5 °F (36.4 °C) 70 16 134/74 96 %      Temp src Heart Rate Source Patient Position BP Location FiO2 (%)   -- -- -- -- --       Physical Exam   Constitutional: Pt. is awake and mostly alert.  She is disoriented to place and time.  She is frail and elderly.  HENT: Normocephalic and atraumatic. Oropharynx moist/nonerythematous.  She had coughed up a large amount of clear sputum into her oropharynx and mask.  Neck: Normal range of motion. Neck supple. No JVD present. No tracheal deviation present. No thyromegaly present.   Cardiovascular: Normal rate, regular rhythm and normal heart sounds. No murmur heard.  Pulmonary/Chest: Tachypneic but overall good air exchange. No wheezes, no rales.   Abdominal: Soft. Bowel sounds are normal. No distension. There is no tenderness. There is no rebound and no guarding.   Musculoskeletal: No edema, tenderness or deformity.   Neurological: She is awake, face is symmetric.  Peripheral exam is nonlocalizing.  Skin: Had areas of ecchymoses and skin tears, especially in the lower extremities.    Otherwise, skin is warm and dry. No rash noted. Pt. is not diaphoretic. No erythema.   Psychiatric: Unable to assess  Nursing note and vitals  reviewed.        LAB RESULTS  Recent Results (from the past 24 hour(s))   Comprehensive Metabolic Panel    Collection Time: 11/05/20  4:19 PM    Specimen: Blood   Result Value Ref Range    Glucose 99 65 - 99 mg/dL    BUN 38 (H) 8 - 23 mg/dL    Creatinine 1.27 (H) 0.57 - 1.00 mg/dL    Sodium 137 136 - 145 mmol/L    Potassium 4.0 3.5 - 5.2 mmol/L    Chloride 101 98 - 107 mmol/L    CO2 20.0 (L) 22.0 - 29.0 mmol/L    Calcium 8.5 8.2 - 9.6 mg/dL    Total Protein 5.1 (L) 6.0 - 8.5 g/dL    Albumin 2.60 (L) 3.50 - 5.20 g/dL    ALT (SGPT) 21 1 - 33 U/L    AST (SGOT) 34 (H) 1 - 32 U/L    Alkaline Phosphatase 104 39 - 117 U/L    Total Bilirubin 0.5 0.0 - 1.2 mg/dL    eGFR Non African Amer 39 (L) >60 mL/min/1.73    Globulin 2.5 gm/dL    A/G Ratio 1.0 g/dL    BUN/Creatinine Ratio 29.9 (H) 7.0 - 25.0    Anion Gap 16.0 (H) 5.0 - 15.0 mmol/L   BNP    Collection Time: 11/05/20  4:19 PM    Specimen: Blood   Result Value Ref Range    proBNP 8,731.0 (H) 0.0-1,800.0 pg/mL   Troponin    Collection Time: 11/05/20  4:19 PM    Specimen: Blood   Result Value Ref Range    Troponin T 0.058 (C) 0.000 - 0.030 ng/mL   Lactate Dehydrogenase    Collection Time: 11/05/20  4:19 PM    Specimen: Blood   Result Value Ref Range     (H) 135 - 214 U/L   Procalcitonin    Collection Time: 11/05/20  4:19 PM    Specimen: Blood   Result Value Ref Range    Procalcitonin 0.16 0.00 - 0.25 ng/mL   C-reactive Protein    Collection Time: 11/05/20  4:19 PM    Specimen: Blood   Result Value Ref Range    C-Reactive Protein 5.76 (H) 0.00 - 0.50 mg/dL   Lactic Acid, Plasma    Collection Time: 11/05/20  4:19 PM    Specimen: Blood   Result Value Ref Range    Lactate 1.6 0.5 - 2.0 mmol/L   Ferritin    Collection Time: 11/05/20  4:19 PM    Specimen: Blood   Result Value Ref Range    Ferritin 446.00 (H) 13.00 - 150.00 ng/mL   CBC Auto Differential    Collection Time: 11/05/20  4:19 PM    Specimen: Blood   Result Value Ref Range    WBC 10.42 3.40 - 10.80 10*3/mm3    RBC  3.45 (L) 3.77 - 5.28 10*6/mm3    Hemoglobin 11.6 (L) 12.0 - 15.9 g/dL    Hematocrit 40.3 34.0 - 46.6 %    .8 (H) 79.0 - 97.0 fL    MCH 33.6 (H) 26.6 - 33.0 pg    MCHC 28.8 (L) 31.5 - 35.7 g/dL    RDW 14.4 12.3 - 15.4 %    RDW-SD 62.5 (H) 37.0 - 54.0 fl    MPV 11.9 6.0 - 12.0 fL    Platelets 107 (L) 140 - 450 10*3/mm3   Manual Differential    Collection Time: 11/05/20  4:19 PM    Specimen: Blood   Result Value Ref Range    Neutrophil % 97.0 (H) 42.7 - 76.0 %    Lymphocyte % 1.0 (L) 19.6 - 45.3 %    Monocyte % 2.0 (L) 5.0 - 12.0 %    Neutrophils Absolute 10.11 (H) 1.70 - 7.00 10*3/mm3    Lymphocytes Absolute 0.10 (L) 0.70 - 3.10 10*3/mm3    Monocytes Absolute 0.21 0.10 - 0.90 10*3/mm3    Misha Cells Large/3+ None Seen    Poikilocytes Large/3+ None Seen    WBC Morphology Normal Normal    Platelet Morphology Normal Normal   Respiratory Panel PCR w/COVID-19(SARS-CoV-2) LOLA/FABIO/ALEE/PAD/COR/MAD/[facility]Y In-House, NP Swab in [facility], 3-4 HR TAT - Swab, Nasopharynx    Collection Time: 11/05/20  4:22 PM    Specimen: Nasopharynx; Swab   Result Value Ref Range    ADENOVIRUS, PCR Not Detected Not Detected    Coronavirus 229E Not Detected Not Detected    Coronavirus HKU1 Not Detected Not Detected    Coronavirus NL63 Not Detected Not Detected    Coronavirus OC43 Not Detected Not Detected    COVID19 Not Detected Not Detected - Ref. Range    Human Metapneumovirus Not Detected Not Detected    Human Rhinovirus/Enterovirus Not Detected Not Detected    Influenza A PCR Not Detected Not Detected    Influenza B PCR Not Detected Not Detected    Parainfluenza Virus 1 Not Detected Not Detected    Parainfluenza Virus 2 Not Detected Not Detected    Parainfluenza Virus 3 Not Detected Not Detected    Parainfluenza Virus 4 Not Detected Not Detected    RSV, PCR Not Detected Not Detected    Bordetella pertussis pcr Not Detected Not Detected    Bordetella parapertussis PCR Not Detected Not Detected    Chlamydophila pneumoniae PCR Not Detected Not  Detected    Mycoplasma pneumo by PCR Not Detected Not Detected   ECG 12 Lead    Collection Time: 11/05/20  4:50 PM   Result Value Ref Range    QT Interval 413 ms       Ordered the above labs and independently reviewed the results.        RADIOLOGY  Xr Chest Ap    Result Date: 11/5/2020  EMERGENCY PORTABLE VIEW OF THE CHEST 11/05/2020  CLINICAL HISTORY: Patient has fever and cough. Covid evaluation.  COMPARISON: This is correlated to prior chest x-ray on 09/18/2019 and 09/20/2019.  FINDINGS: The cardiomediastinal silhouette and pulmonary vasculature are within normal limits. There is a coronary stent projecting over the left superior heart border. There is some linear patchy airspace opacities of both lung bases, I favor bibasilar atelectasis and/or scarring over pneumonic infiltrate and this is similar to prior exam. There is minimal blunting of the lateral costophrenic angles that may be due to pleural-parenchymal scarring or small bilateral effusions. The mid and upper lung zones are clear.      There are linear patchy opacities of both lung bases that I favor is bibasilar atelectasis or scarring as it is similar prior exam although basilar pneumonia cannot be excluded and there is minimal blunting of the lateral costophrenic angles that I favor is due to pleural-parenchymal scarring or small bilateral effusions. Otherwise no active disease is seen in the chest.        I ordered the above noted radiological studies. Reviewed by me and discussed with radiologist.  See dictation for official radiology interpretation.      PROCEDURES    Procedures      MEDICATIONS GIVEN IN ER    Medications   sodium chloride 0.9 % flush 10 mL (10 mL Intravenous Given 11/5/20 1619)   furosemide (LASIX) injection 20 mg (has no administration in time range)         PROGRESS, DATA ANALYSIS, CONSULTS, AND MEDICAL DECISION MAKING    Any/all labs have been independently reviewed by me.  Any/all radiology studies have been reviewed by me and  discussed with radiologist dictating the report.   EKG's independently viewed and interpreted by me.  Discussion below represents my analysis of pertinent findings related to patient's condition, differential diagnosis, treatment plan and final disposition.      ED Course as of Nov 05 1852   Thu Nov 05, 2020   1545 Prior record review: The patient was last admitted to this facility in September 2019 for right lower lobe pneumonia.  She also has a history of coronary artery disease but is being treated medically.    [WC]   1707 WBC: 10.42 [WC]   1707 Hemoglobin(!): 11.6 [WC]   1707 Hematocrit: 40.3 [WC]   1707 Platelets(!): 107 [WC]   1728 BUN(!): 38 [WC]   1728 Creatinine(!): 1.27 [WC]   1728 Ferritin(!): 446.00 [WC]   1728 C-Reactive Protein(!): 5.76 [WC]   1728 Procalcitonin: 0.16 [WC]   1728 proBNP(!): 8,731.0 [WC]   1728 Lactate: 1.6 [WC]   1731 Troponin T(!!): 0.058 [WC]   1836 COVID19: Not Detected [WC]   1837 BNP and troponin are elevated she has small bilateral pleural effusions on her chest x-ray-I favor CHF of her pneumonia.  Given her advanced age and frailty, I am going to give her a small dose of IV Lasix here and admit her to the hospital.    [WC]   1852 Case discussed with Dr. Chin ([facility])-he agrees to admit the patient to telemetry bed.    [WC]      ED Course User Index  [WC] Clayton Rene MD       AS OF 18:52 EST VITALS:    BP - 132/68  HR - 75  TEMP - 97.5 °F (36.4 °C)  02 SATS - 95%        DIAGNOSIS  Final diagnoses:   Acute diastolic CHF (congestive heart failure) (CMS/HCC)   Advanced age   Coronary artery disease involving native heart, angina presence unspecified, unspecified vessel or lesion type         DISPOSITION  Admitted-telemetry           Clayton Rene MD  11/05/20 1852

## 2020-11-05 NOTE — ED NOTES
Pt reports chest pain and SOA that began @1 hr ago. EMS treated with Aspirin.     Face mask placed on pt at triage.     Carolina Lucia RN  11/05/20 [id number]

## 2020-11-05 NOTE — ED NOTES
Spoke to daughter, states pt has had some trouble with MEZA and edema to R foot recently, better today, home health RN was there today, pt was c/o worsening SOA, CP today. 81 mg ASA x 3 given today PTA. [facility] did have some issue swallowing today.          Sherry Vincent, RN  11/05/20 1966

## 2020-11-05 NOTE — PROGRESS NOTES
Clinical Pharmacy Services: Medication History    Dinah Mcnamara is a 89 y.o. female presenting to [facility] for   Chief Complaint   Patient presents with   • Chest Pain   • Shortness of Breath       She  has a past medical history of Blindness, CAD (coronary artery disease), Diverticulitis, Hypertension, Pancreatitis, and Stroke (CMS/[facility]).    Allergies as of 11/05/2020 - Reviewed 11/05/2020   Allergen Reaction Noted   • Augmentin [amoxicillin-pot clavulanate]  05/12/2016   • Codeine  05/12/2016   • Hydralazine hcl Other (See Comments) 02/27/2017   • Sulfa antibiotics  05/12/2016       Medication information was obtained from: Daughter (Brisa)  Pharmacy and Phone Number:     Prior to Admission Medications     Prescriptions Last Dose Informant Patient Reported? Taking?    aspirin 81 MG chewable tablet 11/5/2020 Family Member Yes Yes    Chew 81 mg Daily.    cholecalciferol (VITAMIN D3) 1000 UNITS tablet 11/4/2020 Family Member Yes Yes    Take 1,000 Units by mouth every night at bedtime.    ferrous sulfate 325 (65 FE) MG tablet 11/5/2020 Family Member Yes Yes    Take 325 mg by mouth 2 (Two) Times a Day.    furosemide (LASIX) 20 MG tablet 11/5/2020 Family Member Yes Yes    Take 20 mg by mouth Daily.    guaiFENesin (MUCINEX) 600 MG 12 hr tablet 11/5/2020 Family Member Yes Yes    Take 600 mg by mouth 2 (Two) Times a Day.    isosorbide mononitrate (IMDUR) 30 MG 24 hr tablet 11/5/2020 Family Member Yes Yes    Take 30 mg by mouth Daily.    levothyroxine (SYNTHROID, LEVOTHROID) 75 MCG tablet 11/5/2020 Family Member Yes Yes    Take 75 mcg by mouth Daily.    magnesium oxide (MAG-OX) 400 MG tablet 11/5/2020 Family Member Yes Yes    Take 400 mg by mouth Daily.    megestrol (MEGACE) 40 MG tablet 11/5/2020 Family Member Yes Yes    Take 40 mg by mouth Daily.    melatonin 5 MG tablet tablet 11/4/2020 Family Member Yes Yes    Take 10 mg by mouth Every Night. Every 6 pm     metoprolol tartrate (LOPRESSOR) 25 MG tablet  11/5/2020 Family Member No Yes    Take 0.5 tablets by mouth Every 12 (Twelve) Hours.    montelukast (SINGULAIR) 10 MG tablet 11/4/2020 Family Member Yes Yes    Take 10 mg by mouth every night.    multivitamin (Multi Vitamin Daily) tablet tablet 11/5/2020 Family Member Yes Yes    Take 1 tablet by mouth Daily.    pantoprazole (PROTONIX) 40 MG EC tablet 11/5/2020 Family Member Yes Yes    Take 40 mg by mouth 2 (two) times a day. Takes evening dose around 4:30pm    Probiotic Product ([employer]) capsule 11/5/2020 Family Member Yes Yes    Take 1 capsule by mouth Daily.            Medication notes:     This medication list is complete to the best of my knowledge as of 11/5/2020    Please call if questions.    Sherry Waters [facility]  Medication History Technician  [phone]    11/5/2020 18:42 EST

## 2020-11-05 NOTE — ED NOTES
Pt to ED from home for cough, thin clear secretions, states she hurts all over but denies CP. Pt is very [place] even with hearing aids in. Lives at home with family, states she does not walk anymore.     Patient was placed in face mask in first look. Patient was wearing facemask when I entered the room and throughout our encounter. I wore full protective equipment throughout this patient encounter including a face mask, eye shield, gown and gloves. Hand hygiene was performed before donning protective equipment and after removal when leaving the room.       Sherry Vincent, RN  11/05/20 [id number]

## 2020-11-05 NOTE — ED NOTES
On arrival to room pt states I need my mouth wiped, mask pulled down and pt had secretions around mouth, was suctioned by myself and dr mccormick.     rashaad placed.      Sherry Vincent RN  11/05/20 [id number]

## 2020-11-06 ENCOUNTER — APPOINTMENT (OUTPATIENT)
Dept: CARDIOLOGY | Facility: HOSPITAL | Age: 85
End: 2020-11-06

## 2020-11-06 LAB
ALBUMIN SERPL-MCNC: 2.7 G/DL (ref 3.5–5.2)
ALBUMIN/GLOB SERPL: 1 G/DL
ALP SERPL-CCNC: 104 U/L (ref 39–117)
ALT SERPL W P-5'-P-CCNC: 26 U/L (ref 1–33)
ANION GAP SERPL CALCULATED.3IONS-SCNC: 17.8 MMOL/L (ref 5–15)
ANION GAP SERPL CALCULATED.3IONS-SCNC: 21.9 MMOL/L (ref 5–15)
AORTIC DIMENSIONLESS INDEX: 0.6 (DI)
AST SERPL-CCNC: 36 U/L (ref 1–32)
BH CV ECHO MEAS - ACS: 0.9 CM
BH CV ECHO MEAS - AO MAX PG: 7 MMHG
BH CV ECHO MEAS - AO MEAN PG (FULL): 2 MMHG
BH CV ECHO MEAS - AO MEAN PG: 3 MMHG
BH CV ECHO MEAS - AO ROOT AREA (BSA CORRECTED): 2.2
BH CV ECHO MEAS - AO ROOT AREA: 7.1 CM^2
BH CV ECHO MEAS - AO ROOT DIAM: 3 CM
BH CV ECHO MEAS - AO V2 MAX: 132 CM/SEC
BH CV ECHO MEAS - AO V2 MEAN: 81.9 CM/SEC
BH CV ECHO MEAS - AO V2 VTI: 23.7 CM
BH CV ECHO MEAS - AVA(I,A): 1.5 CM^2
BH CV ECHO MEAS - AVA(I,D): 1.5 CM^2
BH CV ECHO MEAS - BSA(HAYCOCK): 1.4 M^2
BH CV ECHO MEAS - BSA: 1.4 M^2
BH CV ECHO MEAS - BZI_BMI: 17.4 KILOGRAMS/M^2
BH CV ECHO MEAS - BZI_METRIC_HEIGHT: 157.5 CM
BH CV ECHO MEAS - BZI_METRIC_WEIGHT: 43.1 KG
BH CV ECHO MEAS - EDV(CUBED): 15.6 ML
BH CV ECHO MEAS - EDV(MOD-SP2): 32 ML
BH CV ECHO MEAS - EDV(MOD-SP4): 43 ML
BH CV ECHO MEAS - EDV(TEICH): 22.3 ML
BH CV ECHO MEAS - EF(CUBED): 82.4 %
BH CV ECHO MEAS - EF(MOD-BP): 54 %
BH CV ECHO MEAS - EF(MOD-SP2): 50 %
BH CV ECHO MEAS - EF(MOD-SP4): 58.1 %
BH CV ECHO MEAS - EF(TEICH): 77.4 %
BH CV ECHO MEAS - ESV(CUBED): 2.7 ML
BH CV ECHO MEAS - ESV(MOD-SP2): 16 ML
BH CV ECHO MEAS - ESV(MOD-SP4): 18 ML
BH CV ECHO MEAS - ESV(TEICH): 5.1 ML
BH CV ECHO MEAS - FS: 44 %
BH CV ECHO MEAS - IVS/LVPW: 1.1
BH CV ECHO MEAS - IVSD: 1.4 CM
BH CV ECHO MEAS - LAT PEAK E' VEL: 3 CM/SEC
BH CV ECHO MEAS - LV DIASTOLIC VOL/BSA (35-75): 30.9 ML/M^2
BH CV ECHO MEAS - LV MASS(C)D: 104.6 GRAMS
BH CV ECHO MEAS - LV MASS(C)DI: 75.1 GRAMS/M^2
BH CV ECHO MEAS - LV MAX PG: 3 MMHG
BH CV ECHO MEAS - LV MEAN PG: 1 MMHG
BH CV ECHO MEAS - LV SYSTOLIC VOL/BSA (12-30): 12.9 ML/M^2
BH CV ECHO MEAS - LV V1 MAX: 86 CM/SEC
BH CV ECHO MEAS - LV V1 MEAN: 48 CM/SEC
BH CV ECHO MEAS - LV V1 VTI: 14.4 CM
BH CV ECHO MEAS - LVIDD: 2.5 CM
BH CV ECHO MEAS - LVIDS: 1.4 CM
BH CV ECHO MEAS - LVLD AP2: 5.6 CM
BH CV ECHO MEAS - LVLD AP4: 5.5 CM
BH CV ECHO MEAS - LVLS AP2: 4.8 CM
BH CV ECHO MEAS - LVLS AP4: 4.9 CM
BH CV ECHO MEAS - LVOT AREA (M): 2.5 CM^2
BH CV ECHO MEAS - LVOT AREA: 2.5 CM^2
BH CV ECHO MEAS - LVOT DIAM: 1.8 CM
BH CV ECHO MEAS - LVPWD: 1.3 CM
BH CV ECHO MEAS - MED PEAK E' VEL: 4 CM/SEC
BH CV ECHO MEAS - MV A DUR: 0.15 SEC
BH CV ECHO MEAS - MV A MAX VEL: 118 CM/SEC
BH CV ECHO MEAS - MV DEC SLOPE: 335 CM/SEC^2
BH CV ECHO MEAS - MV DEC TIME: 190 SEC
BH CV ECHO MEAS - MV E MAX VEL: 61.2 CM/SEC
BH CV ECHO MEAS - MV E/A: 0.52
BH CV ECHO MEAS - MV MEAN PG: 3 MMHG
BH CV ECHO MEAS - MV P1/2T MAX VEL: 81.4 CM/SEC
BH CV ECHO MEAS - MV P1/2T: 71.2 MSEC
BH CV ECHO MEAS - MV V2 MEAN: 74.3 CM/SEC
BH CV ECHO MEAS - MV V2 VTI: 31.9 CM
BH CV ECHO MEAS - MVA P1/2T LCG: 2.7 CM^2
BH CV ECHO MEAS - MVA(P1/2T): 3.1 CM^2
BH CV ECHO MEAS - MVA(VTI): 1.1 CM^2
BH CV ECHO MEAS - PA ACC SLOPE: 2850 CM/SEC^2
BH CV ECHO MEAS - PA ACC TIME: 0.06 SEC
BH CV ECHO MEAS - PA MAX PG (FULL): 7.6 MMHG
BH CV ECHO MEAS - PA MAX PG: 10.1 MMHG
BH CV ECHO MEAS - PA PR(ACCEL): 52.9 MMHG
BH CV ECHO MEAS - PA V2 MAX: 159 CM/SEC
BH CV ECHO MEAS - PVA(V,A): 1 CM^2
BH CV ECHO MEAS - PVA(V,D): 1 CM^2
BH CV ECHO MEAS - QP/QS: 0.71
BH CV ECHO MEAS - RAP SYSTOLE: 8 MMHG
BH CV ECHO MEAS - RV MAX PG: 2.5 MMHG
BH CV ECHO MEAS - RV MEAN PG: 1 MMHG
BH CV ECHO MEAS - RV V1 MAX: 79.7 CM/SEC
BH CV ECHO MEAS - RV V1 MEAN: 47.3 CM/SEC
BH CV ECHO MEAS - RV V1 VTI: 12.9 CM
BH CV ECHO MEAS - RVOT AREA: 2 CM^2
BH CV ECHO MEAS - RVOT DIAM: 1.6 CM
BH CV ECHO MEAS - RVSP: 43.3 MMHG
BH CV ECHO MEAS - SI(AO): 120.3 ML/M^2
BH CV ECHO MEAS - SI(CUBED): 9.2 ML/M^2
BH CV ECHO MEAS - SI(LVOT): 26.3 ML/M^2
BH CV ECHO MEAS - SI(MOD-SP2): 11.5 ML/M^2
BH CV ECHO MEAS - SI(MOD-SP4): 17.9 ML/M^2
BH CV ECHO MEAS - SI(TEICH): 12.4 ML/M^2
BH CV ECHO MEAS - SV(AO): 167.5 ML
BH CV ECHO MEAS - SV(CUBED): 12.9 ML
BH CV ECHO MEAS - SV(LVOT): 36.6 ML
BH CV ECHO MEAS - SV(MOD-SP2): 16 ML
BH CV ECHO MEAS - SV(MOD-SP4): 25 ML
BH CV ECHO MEAS - SV(RVOT): 25.9 ML
BH CV ECHO MEAS - SV(TEICH): 17.3 ML
BH CV ECHO MEAS - TAPSE (>1.6): 1.6 CM
BH CV ECHO MEAS - TR MAX VEL: 297 CM/SEC
BH CV ECHO MEASUREMENTS AVERAGE E/E' RATIO: 17.49
BH CV VAS BP RIGHT ARM: NORMAL MMHG
BH CV XLRA - RV BASE: 2.5 CM
BH CV XLRA - RV LENGTH: 5 CM
BH CV XLRA - RV MID: 0.1 CM
BH CV XLRA - TDI S': 13 CM/SEC
BILIRUB SERPL-MCNC: 0.6 MG/DL (ref 0–1.2)
BUN SERPL-MCNC: 42 MG/DL (ref 8–23)
BUN SERPL-MCNC: 43 MG/DL (ref 8–23)
BUN/CREAT SERPL: 32.6 (ref 7–25)
BUN/CREAT SERPL: 33.1 (ref 7–25)
CALCIUM SPEC-SCNC: 8.6 MG/DL (ref 8.2–9.6)
CALCIUM SPEC-SCNC: 8.6 MG/DL (ref 8.2–9.6)
CHLORIDE SERPL-SCNC: 103 MMOL/L (ref 98–107)
CHLORIDE SERPL-SCNC: 106 MMOL/L (ref 98–107)
CO2 SERPL-SCNC: 14.1 MMOL/L (ref 22–29)
CO2 SERPL-SCNC: 17.2 MMOL/L (ref 22–29)
CREAT SERPL-MCNC: 1.29 MG/DL (ref 0.57–1)
CREAT SERPL-MCNC: 1.3 MG/DL (ref 0.57–1)
DEPRECATED RDW RBC AUTO: 52.4 FL (ref 37–54)
ERYTHROCYTE [DISTWIDTH] IN BLOOD BY AUTOMATED COUNT: 14.5 % (ref 12.3–15.4)
GFR SERPL CREATININE-BSD FRML MDRD: 38 ML/MIN/1.73
GFR SERPL CREATININE-BSD FRML MDRD: 38 ML/MIN/1.73
GLOBULIN UR ELPH-MCNC: 2.7 GM/DL
GLUCOSE SERPL-MCNC: 79 MG/DL (ref 65–99)
GLUCOSE SERPL-MCNC: 79 MG/DL (ref 65–99)
HCT VFR BLD AUTO: 35.5 % (ref 34–46.6)
HGB BLD-MCNC: 12.3 G/DL (ref 12–15.9)
LEFT ATRIUM VOLUME INDEX: 26 ML/M2
MAXIMAL PREDICTED HEART RATE: 122 BPM
MCH RBC QN AUTO: 33.9 PG (ref 26.6–33)
MCHC RBC AUTO-ENTMCNC: 34.6 G/DL (ref 31.5–35.7)
MCV RBC AUTO: 97.8 FL (ref 79–97)
PLATELET # BLD AUTO: 100 10*3/MM3 (ref 140–450)
PMV BLD AUTO: 11.4 FL (ref 6–12)
POTASSIUM SERPL-SCNC: 3.4 MMOL/L (ref 3.5–5.2)
POTASSIUM SERPL-SCNC: 3.6 MMOL/L (ref 3.5–5.2)
PROT SERPL-MCNC: 5.4 G/DL (ref 6–8.5)
RBC # BLD AUTO: 3.63 10*6/MM3 (ref 3.77–5.28)
SODIUM SERPL-SCNC: 138 MMOL/L (ref 136–145)
SODIUM SERPL-SCNC: 142 MMOL/L (ref 136–145)
STRESS TARGET HR: 104 BPM
WBC # BLD AUTO: 14.63 10*3/MM3 (ref 3.4–10.8)

## 2020-11-06 PROCEDURE — 93306 TTE W/DOPPLER COMPLETE: CPT | Performed by: INTERNAL MEDICINE

## 2020-11-06 PROCEDURE — 93306 TTE W/DOPPLER COMPLETE: CPT

## 2020-11-06 PROCEDURE — 25010000002 FUROSEMIDE PER 20 MG: Performed by: HOSPITALIST

## 2020-11-06 PROCEDURE — 85027 COMPLETE CBC AUTOMATED: CPT | Performed by: HOSPITALIST

## 2020-11-06 PROCEDURE — 92610 EVALUATE SWALLOWING FUNCTION: CPT | Performed by: SPEECH-LANGUAGE PATHOLOGIST

## 2020-11-06 PROCEDURE — 99222 1ST HOSP IP/OBS MODERATE 55: CPT | Performed by: INTERNAL MEDICINE

## 2020-11-06 PROCEDURE — 80053 COMPREHEN METABOLIC PANEL: CPT | Performed by: HOSPITALIST

## 2020-11-06 RX ORDER — ASPIRIN 81 MG/1
81 TABLET, CHEWABLE ORAL DAILY
Status: DISCONTINUED | OUTPATIENT
Start: 2020-11-06 | End: 2020-11-07 | Stop reason: HOSPADM

## 2020-11-06 RX ORDER — DIPHENOXYLATE HYDROCHLORIDE AND ATROPINE SULFATE 2.5; .025 MG/1; MG/1
1 TABLET ORAL DAILY
Status: DISCONTINUED | OUTPATIENT
Start: 2020-11-06 | End: 2020-11-07 | Stop reason: HOSPADM

## 2020-11-06 RX ORDER — FERROUS SULFATE 325(65) MG
325 TABLET ORAL NIGHTLY
Status: DISCONTINUED | OUTPATIENT
Start: 2020-11-06 | End: 2020-11-06

## 2020-11-06 RX ORDER — POTASSIUM CHLORIDE 1.5 G/1.77G
20 POWDER, FOR SOLUTION ORAL 2 TIMES DAILY
Status: DISCONTINUED | OUTPATIENT
Start: 2020-11-06 | End: 2020-11-07 | Stop reason: HOSPADM

## 2020-11-06 RX ORDER — MEGESTROL ACETATE 40 MG/1
40 TABLET ORAL DAILY
Status: DISCONTINUED | OUTPATIENT
Start: 2020-11-06 | End: 2020-11-07 | Stop reason: HOSPADM

## 2020-11-06 RX ORDER — CHOLECALCIFEROL (VITAMIN D3) 125 MCG
10 CAPSULE ORAL NIGHTLY
Status: DISCONTINUED | OUTPATIENT
Start: 2020-11-06 | End: 2020-11-06

## 2020-11-06 RX ORDER — MONTELUKAST SODIUM 10 MG/1
10 TABLET ORAL NIGHTLY
Status: DISCONTINUED | OUTPATIENT
Start: 2020-11-06 | End: 2020-11-07 | Stop reason: HOSPADM

## 2020-11-06 RX ORDER — CHOLECALCIFEROL (VITAMIN D3) 125 MCG
5 CAPSULE ORAL NIGHTLY
Status: DISCONTINUED | OUTPATIENT
Start: 2020-11-06 | End: 2020-11-06

## 2020-11-06 RX ORDER — FUROSEMIDE 10 MG/ML
20 INJECTION INTRAMUSCULAR; INTRAVENOUS ONCE
Status: DISCONTINUED | OUTPATIENT
Start: 2020-11-06 | End: 2020-11-06

## 2020-11-06 RX ORDER — ISOSORBIDE MONONITRATE 30 MG/1
30 TABLET, EXTENDED RELEASE ORAL DAILY
Status: DISCONTINUED | OUTPATIENT
Start: 2020-11-06 | End: 2020-11-07 | Stop reason: HOSPADM

## 2020-11-06 RX ORDER — GUAIFENESIN 600 MG/1
600 TABLET, EXTENDED RELEASE ORAL EVERY 12 HOURS SCHEDULED
Status: DISCONTINUED | OUTPATIENT
Start: 2020-11-06 | End: 2020-11-06

## 2020-11-06 RX ORDER — MELATONIN
1000 NIGHTLY
Status: DISCONTINUED | OUTPATIENT
Start: 2020-11-06 | End: 2020-11-07 | Stop reason: HOSPADM

## 2020-11-06 RX ORDER — LEVOTHYROXINE SODIUM 0.07 MG/1
75 TABLET ORAL
Status: DISCONTINUED | OUTPATIENT
Start: 2020-11-06 | End: 2020-11-07 | Stop reason: HOSPADM

## 2020-11-06 RX ORDER — PANTOPRAZOLE SODIUM 40 MG/1
40 TABLET, DELAYED RELEASE ORAL
Status: DISCONTINUED | OUTPATIENT
Start: 2020-11-06 | End: 2020-11-07 | Stop reason: HOSPADM

## 2020-11-06 RX ORDER — CHOLECALCIFEROL (VITAMIN D3) 125 MCG
5 CAPSULE ORAL NIGHTLY PRN
Status: DISCONTINUED | OUTPATIENT
Start: 2020-11-06 | End: 2020-11-07 | Stop reason: HOSPADM

## 2020-11-06 RX ADMIN — METOPROLOL TARTRATE 12.5 MG: 25 TABLET ORAL at 06:57

## 2020-11-06 RX ADMIN — Medication 1000 UNITS: at 21:15

## 2020-11-06 RX ADMIN — FUROSEMIDE 40 MG: 10 INJECTION, SOLUTION INTRAMUSCULAR; INTRAVENOUS at 13:40

## 2020-11-06 RX ADMIN — ASPIRIN 81 MG: 81 TABLET, CHEWABLE ORAL at 10:17

## 2020-11-06 RX ADMIN — GUAIFENESIN 600 MG: 600 TABLET, EXTENDED RELEASE ORAL at 08:01

## 2020-11-06 RX ADMIN — LEVOTHYROXINE SODIUM 75 MCG: 75 TABLET ORAL at 10:17

## 2020-11-06 RX ADMIN — POTASSIUM CHLORIDE 20 MEQ: 1.5 POWDER, FOR SOLUTION ORAL at 21:43

## 2020-11-06 RX ADMIN — Medication 1 TABLET: at 10:17

## 2020-11-06 RX ADMIN — ISOSORBIDE MONONITRATE 30 MG: 30 TABLET ORAL at 10:17

## 2020-11-06 RX ADMIN — MONTELUKAST SODIUM 10 MG: 10 TABLET, FILM COATED ORAL at 21:15

## 2020-11-06 RX ADMIN — PANTOPRAZOLE SODIUM 40 MG: 40 TABLET, DELAYED RELEASE ORAL at 10:17

## 2020-11-06 RX ADMIN — MAGNESIUM OXIDE 400 MG (241.3 MG MAGNESIUM) TABLET 400 MG: TABLET at 10:17

## 2020-11-06 RX ADMIN — METOPROLOL TARTRATE 12.5 MG: 25 TABLET ORAL at 17:48

## 2020-11-06 RX ADMIN — POTASSIUM CHLORIDE 20 MEQ: 1.5 POWDER, FOR SOLUTION ORAL at 13:40

## 2020-11-06 RX ADMIN — Medication 5 MG: at 21:15

## 2020-11-06 RX ADMIN — MEGESTROL ACETATE 40 MG: 40 TABLET ORAL at 10:23

## 2020-11-06 RX ADMIN — FUROSEMIDE 40 MG: 10 INJECTION, SOLUTION INTRAMUSCULAR; INTRAVENOUS at 00:01

## 2020-11-06 NOTE — CONSULTS
"Adult Nutrition  Assessment/PES    Patient Name:  Dinah Mcnamara  YOB: 1922  MRN: [id number]  Admit Date:  11/5/2020    Assessment Date:  11/6/2020    Comments:  Assessment triggered by RN consult per admission screen - weight loss and difficulty chewing/swallowing indicated.     EMR reviewed for data - 89 yo pt just admitted from the ED. Dx with heart failure. She lives at home, [facility] nurse visits per notes. Poor historian, [place] and confusion indicated. Given report of swallow difficulty, would recommend speech therapy eval to determine appropriate texture and/or liquid consistency. Have added mighty shake supplements BID for now. Pt wt in 9/2019 = 98 lb, currently 95 lb. Will monitor intake, clinical course. RD following.     Reason for Assessment     Row Name 11/06/20 0826          Reason for Assessment    Reason For Assessment  nurse/nurse practitioner consult     Diagnosis  cardiac disease heart failure     Identified At Risk by Screening Criteria  difficulty chewing/swallowing;unintentional loss of 10 lbs or more in the past 2 mos         Nutrition/Diet History     Row Name 11/06/20 0827          Nutrition/Diet History    Typical Food/Fluid Intake  Pt just admitted thru ED with MEZA, difficulty swallowing at home. Lives with family, no longer ambulates. Has a  nurse who comes to her.     Typical Activity Patterns  sedentary     Functional Status  nonambulatory     Factors Affecting Nutritional Intake  difficulty/impaired swallowing;impaired cognitive status/motor control         Anthropometrics     Row Name 11/06/20 0829          Anthropometrics    Height  157.5 cm (62\")        Admit Weight    Admit Weight Method  measured     Admit Weight  43.1 kg (95 lb)        Ideal Body Weight (IBW)    Ideal Body Weight (IBW) (kg)  50.43     % of Ideal Body Weight Assessment  80-90%: mild deficit 85%        Usual Body Weight (UBW)    Usual Body Weight  44.5 kg (98 lb) 9/2019     Weight Loss Time Frame  no " "significant weight variance >1 yr        Body Mass Index (BMI)    BMI Assessment  BMI 17-18.4: protein-energy malnutrition grade I 17.4         Labs/Tests/Procedures/Meds     Row Name 11/06/20 0830          Labs/Procedures/Meds    Lab Results Reviewed  reviewed     Lab Results Comments  CRP, Alb, BUN, Cr, BNP, troponin        Diagnostic Tests/Procedures    Diagnostic Test/Procedure Reviewed  reviewed     Diagnostic Test/Procedures Comments  linear patchy opacities noted on cxr        Medications    Pertinent Medications Reviewed  reviewed     Pertinent Medications Comments  Fe, diuretic, mucinex         Physical Findings     Row Name 11/06/20 0834          Physical Findings    Overall Physical Appearance  underweight     Skin  other (see comments) [place] 16, skin intact         Estimated/Assessed Needs     Row Name 11/06/20 0834 11/06/20 0829       Calculation Measurements    Weight Used For Calculations  43.1 kg (95 lb)  --    Height  --  157.5 cm (62\")       Estimated/Assessed Needs    Additional Documentation  Fluid Requirements (Group);KCAL/KG (Group);Protein Requirements (Group)  --       KCAL/KG    KCAL/KG  30 Kcal/Kg (kcal)  --    30 Kcal/Kg (kcal)  1292.76  --       Protein Requirements    Weight Used For Protein Calculations  43.1 kg (95 lb)  --    Est Protein Requirement Amount (gms/kg)  1.2 gm protein  --    Estimated Protein Requirements (gms/day)  51.71  --       Fluid Requirements    Fluid Requirements (mL/day)  1300  --    Estimated Fluid Requirement Method  RDA Method  --    RDA Method (mL)  1300  --        Nutrition Prescription Ordered     Row Name 11/06/20 0836          Nutrition Prescription PO    Current PO Diet  Regular     Fluid Consistency  Thin     Common Modifiers  Cardiac         Evaluation of Received Nutrient/Fluid Intake     Row Name 11/06/20 0836          PO Evaluation    Number of Days PO Intake Evaluated  Insufficient Data               Problem/Interventions:  Problem 1     Row Name " 11/06/20 0836          Nutrition Diagnoses Problem 1    Problem 1  Underweight     Signs/Symptoms (evidenced by)  BMI     BMI  17 - 17.9         Problem 2     Row Name 11/06/20 0836          Nutrition Diagnoses Problem 2    Problem 2  Swallowing Difficulty     Signs/Symptoms (evidenced by)  Report/Observation     Other Comment  Reported swallowing difficulty to ED upon adm - ? ST to eval             Intervention Goal     Row Name 11/06/20 0837          Intervention Goal    General  Reduce/improve symptoms;Disease management/therapy     PO  Establish PO;Tolerate PO;PO intake (%)     PO Intake %  65 %     Weight  Maintain weight         Nutrition Intervention     Row Name 11/06/20 0837          Nutrition Intervention    RD/Tech Action  Follow Tx progress;Care plan reviewd;Recommend/ordered;Other (comment) ST eval?     Recommended/Ordered  Supplement         Nutrition Prescription     Row Name 11/06/20 0838          Nutrition Prescription PO    PO Prescription  Begin/change supplement     Supplement  Mighty Shake     Supplement Frequency  2 times a day         Education/Evaluation     Row Name 11/06/20 0838          Education    Education  Will Instruct as appropriate        Monitor/Evaluation    Monitor  Per protocol           Electronically signed by:  Penny Singer RD  11/06/20 08:39 EST

## 2020-11-06 NOTE — ED NOTES
This RN wearing PPE per protocol, including face mask, gloves, gown, and eye protection at all times during any periods of contact w/ patient. Patient wearing mask at all times while staff is present in exam room.     Sherry West, RN  11/05/20 1937

## 2020-11-06 NOTE — PROGRESS NOTES
Discharge Planning Assessment  [facility]     Patient Name: Dniah Mcnamara  MRN: [id number]  Today's Date: 11/6/2020    Admit Date: 11/5/2020    Discharge Needs Assessment     Row Name 11/06/20 1411       Living Environment    Lives With  child(georgina), adult    Name(s) of Who Lives With Patient  Marisa Taylor & Lucero Martell, daughter's alternate staying with Pt.    Current Living Arrangements  home/apartment/condo    Primary Care Provided by  child(georgina)    Provides Primary Care For  no one, unable/limited ability to care for self    Family Caregiver if Needed  child(georgina), adult    Family Caregiver Names  Janel & Lucero, daughters are caretakers    Quality of Family Relationships  helpful;involved;supportive    Able to Return to Prior Arrangements  yes       Resource/Environmental Concerns    Resource/Environmental Concerns  none    Transportation Concerns  car, none       Transition Planning    Patient/Family Anticipates Transition to  home with family    Transportation Anticipated  family or friend will provide       Discharge Needs Assessment    Equipment Currently Used at Home  shower chair;walker, rolling;commode    Concerns to be Addressed  no discharge needs identified;denies needs/concerns at this time    Anticipated Changes Related to Illness  none    Equipment Needed After Discharge  none    Discharge Facility/Level of Care Needs  home with home health    Provided Post Acute Provider List?  N/A    N/A Provider List Comment  Pt is current wit [facility] and [employer] in-home caregivers    Provided Post Acute Provider Quality & Resource List?  Refused    Refused Quality and Resource List Comment  daughter refused need for list.        Discharge Plan     Row Name 11/06/20 1426       Plan    Plan  11/6- Return home with current [facility];  Daughters are caregivers    Plan Comments  CCP spoke with Pt daughter, Lucero Muora ([phone]) at bedside.  Pt off unit for testing.  CCP donned a mask  and face shield.  CCP spoke with daughter at a distance greater than six feet.  CCP introduced self and role.  Daughter confirmed information on Pt face sheet.  Daughter reports Pt lives alone, however, she is never left by herself.  Daughter reports Pt lives in a single story home with three entrance stair steps.  Daughter reports that she and her sister, Janel Taylor ([phone]) are Pt caregivers.  Daughter confirmed Pt also has private pay [employer] who sit with her for a few hours a day 7days a week.  Daughter reports Pt receives assistance with bathing.  Pt ambulates around home with a rolling walker.  Pt has 3:1 commode & shower chair.  Pt is current wit [facility].  [facility] informed [facility] [phone] that Pt is in hospital.  Daughter confirmed Pt PCP is Bartolo Gardner MD.  Pt pharmacy confirmed as [facility] [address].  Daughter denies issues with Pt affording her medications.  Daughter advised Pt has been to [facility] (now [facility]) in the past for sub-acute rehab but she would not want Pt to return there.  Daughter advised that she or her sister will transport Pt home at discharge.  Daughter advised they wish to continue with [facility] services.  CCP will continue to follow…YOHAN NEAL/CAROLINA        Continued Care and Services - Admitted Since 11/5/2020     Home Medical Care     Service Provider Request Status Selected Services Address Phone Fax    [facility]  Pending - Request Sent N/A [address] [id number] [phone]                Demographic Summary     Row Name 11/06/20 1408       General Information    Referral Source  admission list;physician    Reason for Consult  discharge planning    Preferred Language  English     Used During This Interaction  no    Row Name 11/06/20 1349       General Information    Admission Type  inpatient    Arrived From  emergency department    Required Notices Provided  Important Message  from Medicare        Functional Status     Row Name 11/06/20 1409       Functional Status    Usual Activity Tolerance  fair    Current Activity Tolerance  fair       Functional Status, IADL    Medications  independent    Meal Preparation  assistive equipment and person    Housekeeping  assistive equipment and person    Laundry  completely dependent    Shopping  completely dependent       Mental Status    General Appearance WDL  WDL       Mental Status Summary    Recent Changes in Mental Status/Cognitive Functioning  no changes       Employment/    Employment Status  retired        Psychosocial    No documentation.       Abuse/Neglect    No documentation.       Legal    No documentation.       Substance Abuse    No documentation.       Patient Forms    No documentation.           Aga Parnell RN

## 2020-11-06 NOTE — DISCHARGE PLACEMENT REQUEST
"Justino Kim (89 y.o. Female)     Date of Birth Social Security Number Address Home Phone MRN    01/19/1922  [id number] [facility] [address] [facility] [id number] [phone] [id number]    Bahai Marital Status          Spiritism        Admission Date Admission Type Admitting Provider Attending Provider Department, Room/Bed    11/5/20 Emergency Leon Chin MD Ahmed, Aftab, MD [address] [facility], 2203/1    Discharge Date Discharge Disposition Discharge Destination                       Attending Provider: Leon Chin MD    Allergies: Augmentin [Amoxicillin-pot Clavulanate], Codeine, Hydralazine Hcl, Sulfa Antibiotics    Isolation: None   Infection: None   Code Status: No CPR    Ht: 157.5 cm (62\")   Wt: 43.1 kg (95 lb)    Admission Cmt: None   Principal Problem: None                Active Insurance as of 11/5/2020     Primary Coverage     Payor Plan Insurance Group Employer/Plan Group    MEDICARE MEDICARE A & B      Payor Plan Address Payor Plan Phone Number Payor Plan Fax Number Effective Dates    [address] [phone]  1/1/1987 - None Entered    [facility] [id number]       Subscriber Name Subscriber Birth Date Member ID       JUSTINO KIM 1/19/1922 [id number]           Secondary Coverage     Payor Plan Insurance Group Employer/Plan Group    ANTH BLUE CROSS ANTHEM BLUE CROSS BLUE [facility] PPO [id number]     Payor Plan Address Payor Plan Phone Number Payor Plan Fax Number Effective Dates    [address] [phone]  11/12/2011 - None Entered    [facility] [id number]       Subscriber Name Subscriber Birth Date Member ID       JUSTINO KIM 1/19/1922 [id number]                 Emergency Contacts      (Rel.) Home Phone Work Phone Mobile Phone    Brisa Taylor \"Marisa\" (Daughter) [phone] -- [phone]    Lucero Moura (Daughter) [phone] -- [phone]    Zulma Mcnair (Daughter) -- -- [phone]    Ayden Kim (Son) -- -- --          "

## 2020-11-06 NOTE — ED NOTES
Nursing report ED to floor  Dinah Mcnamara  89 y.o.  female    HPI (triage note):   Chief Complaint   Patient presents with   • Chest Pain   • Shortness of Breath       Admitting doctor:   Leon Chin MD    Admitting diagnosis:   The primary encounter diagnosis was Acute diastolic CHF (congestive heart failure) (CMS/HCC). Diagnoses of Advanced age and Coronary artery disease involving native heart, angina presence unspecified, unspecified vessel or lesion type were also pertinent to this visit.    Code status:   Current Code Status     Date Active Code Status Order ID Comments User Context       Prior    Advance Care Planning Activity          Allergies:   Augmentin [amoxicillin-pot clavulanate], Codeine, Hydralazine hcl, and Sulfa antibiotics    Weight:       11/05/20  1625   Weight: 43.1 kg (95 lb)       Most recent vitals:   Vitals:    11/05/20 1752 11/05/20 1900 11/05/20 1911 11/05/20 1920   BP: 132/68 138/67 138/67 120/68   Pulse: 75 67 69 69   Resp: 16 16  16   Temp:       SpO2: 95% 96%  100%   Weight:       Height:           Active LDAs/IV Access:   Lines, Drains & Airways    Active LDAs     Name:   Placement date:   Placement time:   Site:   Days:    Peripheral IV 11/05/20 1537 Left Hand   11/05/20    1537    Hand   less than 1                Labs (abnormal labs have a star):   Labs Reviewed   COMPREHENSIVE METABOLIC PANEL - Abnormal; Notable for the following components:       Result Value    BUN 38 (*)     Creatinine 1.27 (*)     CO2 20.0 (*)     Total Protein 5.1 (*)     Albumin 2.60 (*)     AST (SGOT) 34 (*)     eGFR Non African Amer 39 (*)     BUN/Creatinine Ratio 29.9 (*)     Anion Gap 16.0 (*)     All other components within normal limits    Narrative:     GFR Normal >60  Chronic Kidney Disease <60  Kidney Failure <15     BNP (IN-HOUSE) - Abnormal; Notable for the following components:    proBNP 8,731.0 (*)     All other components within normal limits    Narrative:     Among patients with dyspnea,  NT-proBNP is highly sensitive for the detection of acute congestive heart failure. In addition NT-proBNP of <300 pg/ml effectively rules out acute congestive heart failure with 99% negative predictive value.    Results may be falsely decreased if patient taking Biotin.     TROPONIN (IN-HOUSE) - Abnormal; Notable for the following components:    Troponin T 0.058 (*)     All other components within normal limits    Narrative:     Troponin T Reference Range:  <= 0.03 ng/mL-   Negative for AMI  >0.03 ng/mL-     Abnormal for myocardial necrosis.  Clinicians would have to utilize clinical acumen, EKG, Troponin and serial changes to determine if it is an Acute Myocardial Infarction or myocardial injury due to an underlying chronic condition.       Results may be falsely decreased if patient taking Biotin.     LACTATE DEHYDROGENASE - Abnormal; Notable for the following components:     (*)     All other components within normal limits   C-REACTIVE PROTEIN - Abnormal; Notable for the following components:    C-Reactive Protein 5.76 (*)     All other components within normal limits   FERRITIN - Abnormal; Notable for the following components:    Ferritin 446.00 (*)     All other components within normal limits    Narrative:     Results may be falsely decreased if patient taking Biotin.     CBC WITH AUTO DIFFERENTIAL - Abnormal; Notable for the following components:    RBC 3.45 (*)     Hemoglobin 11.6 (*)     .8 (*)     MCH 33.6 (*)     MCHC 28.8 (*)     RDW-SD 62.5 (*)     Platelets 107 (*)     All other components within normal limits   MANUAL DIFFERENTIAL - Abnormal; Notable for the following components:    Neutrophil % 97.0 (*)     Lymphocyte % 1.0 (*)     Monocyte % 2.0 (*)     Neutrophils Absolute 10.11 (*)     Lymphocytes Absolute 0.10 (*)     All other components within normal limits   RESPIRATORY PANEL PCR W/ COVID-19 (SARS-COV-2) LOLA/FABIO/ALEE/PAD/COR/MAD/DUY IN-HOUSE, NP SWAB IN UT/VTP, 3-4 HR TAT -  "Normal    Narrative:     Fact sheet for providers: https://docs.[employer]/wp-content/uploads/HIX8211-2555-MP0.1-EUA-Provider-Fact-Sheet-3.pdf    Fact sheet for patients: https://docs.[employer]/wp-content/uploads/LMV7756-1493-MU5.1-EUA-Patient-Fact-Sheet-1.pdf   PROCALCITONIN - Normal    Narrative:     As a Marker for Sepsis (Non-Neonates):   1. <0.5 ng/mL represents a low risk of severe sepsis and/or septic shock.  1. >2 ng/mL represents a high risk of severe sepsis and/or septic shock.    As a Marker for Lower Respiratory Tract Infections that require antibiotic therapy:  PCT on Admission     Antibiotic Therapy             6-12 Hrs later  > 0.5                Strongly Recommended            >0.25 - <0.5         Recommended  0.1 - 0.25           Discouraged                   Remeasure/reassess PCT  <0.1                 Strongly Discouraged          Remeasure/reassess PCT      As 28 day mortality risk marker: \"Change in Procalcitonin Result\" (> 80 % or <=80 %) if Day 0 (or Day 1) and Day 4 values are available. Refer to http://www.[employer].compct-calculator.com/   Change in PCT <=80 %   A decrease of PCT levels below or equal to 80 % defines a positive change in PCT test result representing a higher risk for 28-day all-cause mortality of patients diagnosed with severe sepsis or septic shock.  Change in PCT > 80 %   A decrease of PCT levels of more than 80 % defines a negative change in PCT result representing a lower risk for 28-day all-cause mortality of patients diagnosed with severe sepsis or septic shock.                Results may be falsely decreased if patient taking Biotin.    LACTIC ACID, PLASMA - Normal   CBC AND DIFFERENTIAL    Narrative:     The following orders were created for panel order CBC & Differential.  Procedure                               Abnormality         Status                     ---------                               -----------         ------                     CBC Auto " Differential[[id number]]        Abnormal            Final result                 Please view results for these tests on the individual orders.       EKG:   ECG 12 Lead   Final Result   HEART RATE= 76  bpm   RR Interval= 785  ms   OR Interval=   ms   P Horizontal Axis=   deg   P Front Axis=   deg   QRSD Interval= 122  ms   QT Interval= 413  ms   QRS Axis= -48  deg   T Wave Axis= 120  deg   - ABNORMAL ECG -   Sinus rhythm   Frequent PACs- new   Left bundle branch block   Electronically Signed By: Mala Layton ([facility]) 05-Nov-2020 17:03:35   Date and Time of Study: 2020-11-05 16:50:36          Meds given in ED:   Medications   sodium chloride 0.9 % flush 10 mL (10 mL Intravenous Given 11/5/20 1619)   furosemide (LASIX) injection 20 mg (20 mg Intravenous Given 11/5/20 1911)       Imaging results:  Xr Chest Ap    Result Date: 11/5/2020  There are linear patchy opacities of both lung bases that I favor is bibasilar atelectasis or scarring as it is similar prior exam although basilar pneumonia cannot be excluded and there is minimal blunting of the lateral costophrenic angles that I favor is due to pleural-parenchymal scarring or small bilateral effusions. Otherwise no active disease is seen in the chest.  This report was finalized on 11/5/2020 7:27 PM by Dr. Marino Bustos M.D.        Ambulatory status:   Ad salinas    Social issues:   Social History     Socioeconomic History   • Marital status:      Spouse name: Not on file   • Number of children: Not on file   • Years of education: Not on file   • Highest education level: Not on file   Tobacco Use   • Smoking status: Never Smoker   Substance and Sexual Activity   • Alcohol use: No     Alcohol/week: 1.0 standard drinks     Types: 1 Glasses of wine per week     Frequency: Never     Comment: holidays   • Drug use: No   • Sexual activity: Never    Nursing report ED to floor     Sherry West RN  11/05/20 1942

## 2020-11-06 NOTE — CONSULTS
[facility]  Cardiology Consult Note    Patient Identification:  Name: Dinah Mcnamara  Age: 89 y.o.  Sex: female  :  1922  MRN: [id number]             Requesting Physician: Dr. DONALDO Chin    Reason for Consultation / Chief Complaint: shortness of breath    History of Present Illness:   Dinah Mcnamara is a 89-year-old female.  We last seen her in May 2016, but saw LCG in . She has a history of hypertension, and history of stroke, coronary artery disease with a history of stent placement, dyslipidemia. She was brought to [facility] due to to becoming short of breath and chest discomfort.  On admission her chest x-ray   showed linear patchy of the states of the lung bases fever there is basilar atelectasis or scarring otherwise no active disease is seen in the chest.  EKG showed sinus rhythm with LBBB and PACs.  He is echo 2020 revealed EF 54%, calculated RV systolic pressure from TV regurgitation is 43.3 mmHg.  LV wall thickness is consistent with hypertrophy, LV diastolic dysfunction is consistent with grade 1, and no evidence of pericardial effusion.  Labs revealed troponin 0 0.058, BNP 8K, potassium 4, creatinine 1.27, ALT 21, Ferritin 446,  and AST 34.    Comorbid cardiac risk factors: Age, CAD, hypertension    Past Medical History:  Past Medical History:   Diagnosis Date   • Blindness     rt eye   • CAD (coronary artery disease)    • Diverticulitis    • Hypertension    • Pancreatitis    • Stroke (CMS/HCC)      Past Surgical History:  Past Surgical History:   Procedure Laterality Date   • CARDIAC SURGERY     • CHOLECYSTECTOMY     • CORONARY ANGIOPLASTY WITH STENT PLACEMENT     • SHOULDER SURGERY        Allergies:  Allergies   Allergen Reactions   • Augmentin [Amoxicillin-Pot Clavulanate]    • Codeine    • Hydralazine Hcl Other (See Comments)     NUMBNESS ALL OVER THE BODY   • Sulfa Antibiotics      Home Meds:  Medications Prior to Admission   Medication Sig Dispense  Refill Last Dose   • aspirin 81 MG chewable tablet Chew 81 mg Daily.   11/5/2020 at 0700   • cholecalciferol (VITAMIN D3) 1000 UNITS tablet Take 1,000 Units by mouth every night at bedtime.   11/4/2020 at Unknown time   • ferrous sulfate 325 (65 FE) MG tablet Take 325 mg by mouth 2 (Two) Times a Day.   11/5/2020 at 0700   • furosemide (LASIX) 20 MG tablet Take 20 mg by mouth Daily.   11/5/2020 at 0700   • guaiFENesin (MUCINEX) 600 MG 12 hr tablet Take 600 mg by mouth 2 (Two) Times a Day.   11/5/2020 at 0700   • isosorbide mononitrate (IMDUR) 30 MG 24 hr tablet Take 30 mg by mouth Daily.   11/5/2020 at 0700   • levothyroxine (SYNTHROID, LEVOTHROID) 75 MCG tablet Take 75 mcg by mouth Daily.   11/5/2020 at 0700   • magnesium oxide (MAG-OX) 400 MG tablet Take 400 mg by mouth Daily.   11/5/2020 at 0700   • megestrol (MEGACE) 40 MG tablet Take 40 mg by mouth Daily.   11/5/2020 at 0700   • melatonin 5 MG tablet tablet Take 10 mg by mouth Every Night. Every 6 pm    11/4/2020 at Unknown time   • metoprolol tartrate (LOPRESSOR) 25 MG tablet Take 0.5 tablets by mouth Every 12 (Twelve) Hours. 60 tablet 0 11/5/2020 at 0700   • montelukast (SINGULAIR) 10 MG tablet Take 10 mg by mouth every night.   11/4/2020 at Unknown time   • multivitamin (Multi Vitamin Daily) tablet tablet Take 1 tablet by mouth Daily.   11/5/2020 at 0700   • pantoprazole (PROTONIX) 40 MG EC tablet Take 40 mg by mouth 2 (two) times a day. Takes evening dose around 4:30pm   11/5/2020 at 0700   • Probiotic Product ([employer]) capsule Take 1 capsule by mouth Daily.   11/5/2020 at 0700     Current Meds:    Scheduled    Medication Ordered Dose/Rate, Route, Frequency Last Action   aspirin chewable tablet 81 mg 81 mg, PO, Daily Given, 81 mg at 11/06 1017   cholecalciferol (VITAMIN D3) tablet 1,000 Units 1,000 Units, PO, Nightly Ordered   furosemide (LASIX) injection 40 mg 40 mg, IV, Q12H Given, 40 mg at 11/06 1340   influenza vac split quad  "(FLUZONE,FLUARIX,AFLURIA,FLULAVAL) injection 0.5 mL 0.5 mL, IM, Once Ordered   isosorbide mononitrate (IMDUR) 24 hr tablet 30 mg 30 mg, PO, Daily Given, 30 mg at 11/06 1017   levothyroxine (SYNTHROID, LEVOTHROID) tablet 75 mcg 75 mcg, PO, Q AM Given, 75 mcg at 11/06 1017   magnesium oxide (MAG-OX) tablet 400 mg 400 mg, PO, Daily Given, 400 mg at 11/06 1017   megestrol (MEGACE) tablet 40 mg 40 mg, PO, Daily Given, 40 mg at 11/06 1023   metoprolol tartrate (LOPRESSOR) tablet 12.5 mg 12.5 mg, PO, Q12H Given, 12.5 mg at 11/06 0657   montelukast (SINGULAIR) tablet 10 mg 10 mg, PO, Nightly Ordered   multivitamin (THERAGRAN) tablet 1 tablet 1 tablet, PO, Daily Given, 1 tablet at 11/06 1017   pantoprazole (PROTONIX) EC tablet 40 mg 40 mg, PO, Q AM Given, 40 mg at 11/06 1017   potassium chloride (KLOR-CON) packet 20 mEq 20 mEq, PO, BID Given, 20 mEq at 11/06 1340   PRN    Medication Ordered Dose/Rate, Route, Frequency Last Action   melatonin tablet 5 mg 5 mg, PO, Nightly PRN Ordered   sodium chloride 0.9 % flush 10 mL (And Linked Group #1) 10 mL, IV, PRN Given, 10 mL at 11/05 1619       Social History:   Social History     Tobacco Use   • Smoking status: Never Smoker   Substance Use Topics   • Alcohol use: No     Alcohol/week: 1.0 standard drinks     Types: 1 Glasses of wine per week     Frequency: Never     Comment: holidays      Family History:  History reviewed. No pertinent family history.   Review of Systems  Constitutional: No wt loss, fever   Gastrointestinal: No nausea , abdominal pain  Behavioral/Psych: No insomnia or anxiety   Cardiovascular ----positive for sob. All other systems reviewed and are negative                     Physical Exam  /75 (BP Location: Right arm, Patient Position: Lying)   Pulse 75   Temp 97.8 °F (36.6 °C) (Temporal)   Resp 16   Ht 157.5 cm (62\")   Wt 42.1 kg (92 lb 13 oz)   LMP  (LMP Unknown)   SpO2 97%   BMI 16.98 kg/m²     General appearance: No acute changes   Eyes: Sclera " conjunctiva normal, pupils reactive   HENT: Atraumatic; oropharynx clear with moist mucous membranes and no mucosal ulcerations;  Neck: Trachea midline; NECK, supple, no thyromegaly or lymphadenopathy   Lungs: Normal size and shape, normal breath sounds, equal distribution of air, no rales and rhonchi   CV: S1-S2 regular, no murmurs, no rub, no gallop   Abdomen: Soft, non-tender; no masses , no abnormal abdominal sounds   Extremities: No deformity , normal color , no peripheral edema   Skin: Normal temperature, turgor and texture; no rash, ulcers  Psych: Appropriate affect, alert and oriented to person, place and time                   Cardiographics  ECG:     Telemetry:    Echocardiogram:     Imaging  Chest X-ray:   Study Result    EMERGENCY PORTABLE VIEW OF THE CHEST 11/05/2020     CLINICAL HISTORY: Patient has fever and cough. Covid evaluation.      COMPARISON: This is correlated to prior chest x-ray on 09/18/2019 and  09/20/2019.     FINDINGS: The cardiomediastinal silhouette and pulmonary vasculature are  within normal limits. There is a coronary stent projecting over the left  superior heart border. There is some linear patchy airspace opacities of  both lung bases, I favor bibasilar atelectasis and/or scarring over  pneumonic infiltrate and this is similar to prior exam. There is minimal  blunting of the lateral costophrenic angles that may be due to  pleural-parenchymal scarring or small bilateral effusions. The mid and  upper lung zones are clear.     IMPRESSION:  There are linear patchy opacities of both lung bases that I favor is  bibasilar atelectasis or scarring as it is similar prior exam although  basilar pneumonia cannot be excluded and there is minimal blunting of  the lateral costophrenic angles that I favor is due to  pleural-parenchymal scarring or small bilateral effusions. Otherwise no  active disease is seen in the chest.     This report was finalized on 11/5/2020 7:27 PM by Dr. Marino Bustos  M.D.          Lab Review   Results from last 7 days   Lab Units 11/05/20  1619   TROPONIN T ng/mL 0.058*         Results from last 7 days   Lab Units 11/06/20  0822   SODIUM mmol/L 142  138   POTASSIUM mmol/L 3.6  3.4*   BUN mg/dL 43*  42*   CREATININE mg/dL 1.30*  1.29*   CALCIUM mg/dL 8.6  8.6        Results from last 7 days   Lab Units 11/06/20  0822 11/05/20  1619   WBC 10*3/mm3 14.63* 10.42   HEMOGLOBIN g/dL 12.3 11.6*   HEMATOCRIT % 35.5 40.3   PLATELETS 10*3/mm3 100* 107*         The following medical decision was discussed in detail with Dr. Loaiza    Assessment:  Acute on chronic congestive diastolic congestive heart failure  Chest pain   Hypertension  Hypothyroidism  Dementia osteoarthritis  GERD  dysipidemia with intolerance to statins    Recommendations / Plan:     In summary Ms. Mcnamara is a 89-year-old female who we have seen last in 2016.  She has a history which includes CAD and stent placement, hypertension and history of stroke.  Blood pressure and heart rate are currently stable.  On review of echo her LV is normal.  EKG showed sinus rhythm with LBBB and PACs.  Troponin slightly elevated.  At this time do not see any notable CHF.  Continue aspirin, isosorbide, and metoprolol tartrate.  She is on IV Lasix.    Labs/tests ordered for am: Will do EKG and troponins in a.m.      Rich Loaiza MD    11/6/2020, 15:38 EST      EMR Dragon/Transcription:   Dictated utilizing Dragon dictation

## 2020-11-06 NOTE — THERAPY EVALUATION
Acute Care - Speech Language Pathology   Swallow Initial Evaluation [facility]     Patient Name: Dinah Mcnamara  : 1922  MRN: [id number]  Today's Date: 2020               Admit Date: 2020    Visit Dx:     ICD-10-CM ICD-9-CM   1. Acute diastolic CHF (congestive heart failure) (CMS/[facility])  I50.31 428.31     428.0   2. Advanced age  R54 797   3. Coronary artery disease involving native heart, angina presence unspecified, unspecified vessel or lesion type  I25.10 414.01     Patient Active Problem List   Diagnosis   • Acute gallstone pancreatitis   • SIRS (systemic inflammatory response syndrome) (CMS/[facility])   • Colitis   • Hypokalemia   • Dehydration   • CAD (coronary artery disease)   • HTN (hypertension)   • Essential hypertension   • Pneumonia of right lower lobe due to infectious organism   • Rectal bleeding   • Severe anemia   • Acute diastolic CHF (congestive heart failure) (CMS/[facility])     Past Medical History:   Diagnosis Date   • Blindness     rt eye   • CAD (coronary artery disease)    • Diverticulitis    • Hypertension    • Pancreatitis    • Stroke (CMS/[facility])      Past Surgical History:   Procedure Laterality Date   • CARDIAC SURGERY     • CHOLECYSTECTOMY     • CORONARY ANGIOPLASTY WITH STENT PLACEMENT     • SHOULDER SURGERY          SWALLOW EVALUATION (last 72 hours)      SLP Adult Swallow Evaluation     Row Name 20 1200                   General Information    Current Method of Nutrition  regular textures;thin liquids  -SA        Prior Level of Function-Communication  WFL  -SA        Prior Level of Function-Swallowing  other (see comments) daughters blend, mash foods as needed at home  -SA        Plans/Goals Discussed with  patient and family  -SA        Barriers to Rehab  medically complex  -SA        Patient's Goals for Discharge  patient did not state  -SA        Family Goals for Discharge  other (see comments) eat safely and adequately  -SA           Pain    Additional Documentation   Pain Scale: Numbers Pre/Post-Treatment (Group)  -SA           Pain Scale: Numbers Pre/Post-Treatment    Pretreatment Pain Rating  0/10 - no pain  -SA        Posttreatment Pain Rating  0/10 - no pain  -SA           Clinical Swallow Eval    Oral Prep Phase  impaired  -SA        Oral Transit  impaired  -SA        Oral Residue  impaired  -SA        Pharyngeal Phase  suspected pharyngeal impairment  -SA        Esophageal Phase  suspected esophageal impairment  -SA        Clinical Swallow Evaluation Summary  Clinical swallow eval completed.  Pt with throat clearing noted with thin liquids, increased after trial of soft solids.  No throat clear noted with nectar thick liquids or soft solids.  Pt did not show sign of dislike of nectar thick liquids.  Discussed options with daughter.  Wishes to trial [facility] soft diet w/ ground meats, nectar thick liquids.  Declined VFSS and pureed foods (stated they might try that at home if needed).  Stated nutritional intake is top priority.  REC [facility] soft diet w/ ground meats.  Nectar thick liquids.  May have sips of unthickened water 30 mins after meals and oral care.  Meds w/ puree or nectar.   As nutrition is main goal of pt/daughter, if pt c/o nectar can d/c and return to thin liquids Will f/u for diet acceptance/tolerance and need for further education and/or testing.  -SA           Oral Prep Concerns    Oral Prep Concerns  prolonged mastication;inefficient mastication  -SA        Prolonged Mastication  mechanical soft  -SA        Inefficient Mastication  mechanical soft  -SA           Oral Transit Concerns    Oral Transit Concerns  increased oral transit time  -SA        Increased Oral Transit Time  mechanical soft;mixed consistencies  -SA           Oral Residue Concerns    Oral Residue Concerns  other (see comments) pieces of peaches removed from mouth by pt  -SA           Pharyngeal Phase Concerns    Pharyngeal Phase Concerns  throat clear  -SA        Throat Clear  thin  -SA            Esophageal Phase Concerns    Esophageal Phase Concerns  other (see comments) h/o hiatal hernia and  c/o food sticking  -SA           Clinical Impression    SLP Swallowing Diagnosis  suspected pharyngeal dysphagia  -SA        Functional Impact  risk of aspiration/pneumonia  -SA        Rehab Potential/Prognosis, Swallowing  good, to achieve stated therapy goals  -SA        Swallow Criteria for Skilled Therapeutic Interventions Met  demonstrates skilled criteria  -SA           Recommendations    Therapy Frequency (Swallow)  PRN  -SA        Predicted Duration Therapy Intervention (Days)  until discharge  -SA        SLP Diet Recommendation  soft textures;ground;nectar thick liquids;water between meals after oral care, with supervision;ice chips between meals after oral care, with supervision  -SA        Recommended Diagnostics  other (see comments) daughter declines VFSS at this time  -SA        Recommended Precautions and Strategies  upright posture during/after eating;small bites of food and sips of liquid  -SA        Oral Care Recommendations  Oral Care before breakfast, after meals and PRN  -SA        SLP Rec. for Method of Medication Administration  with pudding or applesauce;with thick liquids  -SA        Monitor for Signs of Aspiration  notify SLP if any concerns  -SA        Anticipated Discharge Disposition (SLP)  home with assist  -SA           Swallow Goals (SLP)    Oral Nutrition/Hydration Goal Selection (SLP)  oral nutrition/hydration, SLP goal 1  -SA           Oral Nutrition/Hydration Goal 1 (SLP)    Oral Nutrition/Hydration Goal 1, SLP  Pt will tolerate least restrictive diet  -SA        Time Frame (Oral Nutrition/Hydration Goal 1, SLP)  by discharge  -SA          User Key  (r) = Recorded By, (t) = Taken By, (c) = Cosigned By    Initials Name Effective Dates    Latonia Link MS [facility]-SLP 03/07/18 -           EDUCATION  The patient has been educated in the following areas:   Dysphagia (Swallowing  Impairment) Oral Care/Hydration Modified Diet Instruction.    SLP Recommendation and Plan  SLP Swallowing Diagnosis: suspected pharyngeal dysphagia  SLP Diet Recommendation: soft textures, ground, nectar thick liquids, water between meals after oral care, with supervision, ice chips between meals after oral care, with supervision  Recommended Precautions and Strategies: upright posture during/after eating, small bites of food and sips of liquid  SLP Rec. for Method of Medication Administration: with pudding or applesauce, with thick liquids     Monitor for Signs of Aspiration: notify SLP if any concerns  Recommended Diagnostics: other (see comments)(daughter declines VFSS at this time)  Swallow Criteria for Skilled Therapeutic Interventions Met: demonstrates skilled criteria  Anticipated Discharge Disposition (SLP): home with assist  Rehab Potential/Prognosis, Swallowing: good, to achieve stated therapy goals  Therapy Frequency (Swallow): PRN  Predicted Duration Therapy Intervention (Days): until discharge                         Plan of Care Reviewed With: patient  Outcome Summary: Clinical swallow eval completed.  Pt with throat clearing noted with thin liquids, increased after trial of soft solids.  No throat clear noted with nectar thick liquids or soft solids.  Pt did not show sign of dislike of nectar thick liquids.  Discussed options with daughter.  Wishes to trial [facility] soft diet w/ ground meats, nectar thick liquids.  Declined VFSS and pureed foods (stated they might try that at home if needed).  Stated nutritional intake is top priority.  REC [facility] soft diet w/ ground meats.  Nectar thick liquids.  May have sips of unthickened water 30 mins after meals and oral care.  Meds w/ puree or nectar.   As nutrition is main goal of pt/daughter, if pt c/o nectar can d/c and return to thin liquids Will f/u for diet acceptance/tolerance and need for further education and/or testing.    SLP GOALS     Row Name 11/06/20  1200             Oral Nutrition/Hydration Goal 1 (SLP)    Oral Nutrition/Hydration Goal 1, SLP  Pt will tolerate least restrictive diet  -      Time Frame (Oral Nutrition/Hydration Goal 1, SLP)  by discharge  -        User Key  (r) = Recorded By, (t) = Taken By, (c) = Cosigned By    Initials Name Provider Type    Latonia Link MS CCC-SLP Speech and Language Pathologist           SLP Outcome Measures (last 72 hours)      SLP Outcome Measures     Row Name 11/06/20 1200             SLP Outcome Measures    Outcome Measure Used?  Adult NOMS  -SA         Adult FCM Scores    FCM Chosen  Swallowing  -SA      Swallowing FCM Score  4  -SA        User Key  (r) = Recorded By, (t) = Taken By, (c) = Cosigned By    Initials Name Effective Dates    Latonia Link MS CCC-SLP 03/07/18 -            Time Calculation:   Time Calculation- SLP     Row Name 11/06/20 1253             Time Calculation- SLP    SLP Start Time  1200  -SA      SLP Received On  11/06/20  -        User Key  (r) = Recorded By, (t) = Taken By, (c) = Cosigned By    Initials Name Provider Type    Latonia iLnk MS CCC-SLP Speech and Language Pathologist          Therapy Charges for Today     Code Description Service Date Service Provider Modifiers Qty    [id number] HC ST EVAL ORAL PHARYNG SWALLOW 4 11/6/2020 Latonia Maldonado MS CCC-JONO GN 1               MS COREY Membreno  11/6/2020

## 2020-11-06 NOTE — NURSING NOTE
11/06/20 1425   Wound 11/06/20 Left lower leg Abrasion   Placement Date: 11/06/20   Present on Hospital Admission: Yes  Side: Left  Orientation: lower  Location: leg  Primary Wound Type: Abrasion   Base purple   Periwound ecchymotic  (3 cm ecchymosis)   Periwound Temperature warm   Periwound Skin Turgor soft   Wound Length (cm) 0.5 cm   Wound Width (cm) 0.5 cm   Drainage Characteristics/Odor sanguineous   Drainage Amount scant   Dressing Care dressing applied  (optifoam)     [facility] dept - patient with scattered bruises to bilateral shins. L shin about 3 cm ecchymotic/abrasion with scant bleeding. Area no s/s infection and needs to be covered and protected during hospitalization. Optifoam applied to be left intact until d/c.

## 2020-11-06 NOTE — H&P
"History and physical    Primary care physician      Chief complaint  Chest pain  Shortness of breath    History of present illness  89-year white female with history of hypertension CVA congestive heart failure who is legally blind and lives at home with her daughters brought to the emergency room when she was using the bathroom quite short of breath and then chest discomfort.  Patient denies any fever cough abdominal pain nausea vomiting diarrhea.  Patient work-up in ER revealed decompensated CHF admit for management.  Patient unable to give history most of history obtained from the daughter chart nursing staff and old record.  Patient is DNR per her wishes    PAST MEDICAL HISTORY  • Blindness     • Diverticulitis     • Hypertension     • Pancreatitis     • Stroke (CMS/HCC)        PAST SURGICAL HISTORY              Procedure Laterality Date   • CARDIAC SURGERY       • CHOLECYSTECTOMY       • CORONARY ANGIOPLASTY WITH STENT PLACEMENT       • SHOULDER SURGERY            FAMILY HISTORY  History reviewed. No pertinent family history.     SOCIAL HISTORY                 Socioeconomic History   • Marital status:        Spouse name: Not on file   • Number of children: Not on file   • Years of education: Not on file   • Highest education level: Not on file   Tobacco Use   • Smoking status: Never Smoker   Substance and Sexual Activity   • Alcohol use: No       Alcohol/week: 1.0 standard drinks       Types: 1 Glasses of wine per week       Frequency: Never       Comment: holidays   • Drug use: No   • Sexual activity: Never        ALLERGIES  Augmentin [amoxicillin-pot clavulanate], Codeine, Hydralazine hcl, and Sulfa antibiotics  Home medications reviewed     REVIEW OF SYSTEMS  Unable to perform     PHYSICAL EXAM  Blood pressure 127/82, pulse 78, temperature 97.4 °F (36.3 °C), temperature source Temporal, resp. rate 16, height 157.5 cm (62\"), weight 43.1 kg (95 lb), SpO2 (!) 73 %, unknown if currently " breastfeeding.    Constitutional: Pt. is awake and mostly alert.  She is disoriented to place and time.  She is frail and elderly.  HENT: Normocephalic and atraumatic. Oropharynx moist/nonerythematous.  She had coughed up a large amount of clear sputum into her oropharynx and mask.  Neck: Normal range of motion. Neck supple. No JVD present. No tracheal deviation present. No thyromegaly present.   Cardiovascular: Normal rate, regular rhythm and normal heart sounds. No murmur heard.  Pulmonary/Chest: Tachypneic but overall good air exchange. No wheezes, no rales.   Abdominal: Soft. Bowel sounds are normal. No distension. There is no tenderness. There is no rebound and no guarding.   Musculoskeletal: No edema, tenderness or deformity.   Neurological: She is awake, face is symmetric.  Peripheral exam is nonlocalizing.  Skin: Had areas of ecchymoses and skin tears, especially in the lower extremities.      Skin is warm and dry. No rash noted. Pt. is not diaphoretic. No erythema.   Psychiatric: Unable to assess    LAB RESULTS  Lab Results (last 24 hours)     Procedure Component Value Units Date/Time    Basic Metabolic Panel [[id number]]  (Abnormal) Collected: 11/06/20 0822    Specimen: Blood from Hand, Right Updated: 11/06/20 1005     Glucose 79 mg/dL      BUN 42 mg/dL      Creatinine 1.29 mg/dL      Sodium 138 mmol/L      Potassium 3.4 mmol/L      Chloride 103 mmol/L      CO2 17.2 mmol/L      Calcium 8.6 mg/dL      eGFR Non African Amer 38 mL/min/1.73      BUN/Creatinine Ratio 32.6     Anion Gap 17.8 mmol/L     Narrative:      GFR Normal >60  Chronic Kidney Disease <60  Kidney Failure <15      CBC (No Diff) [[id number]]  (Abnormal) Collected: 11/06/20 0822    Specimen: Blood Updated: 11/06/20 0909     WBC 14.63 10*3/mm3      RBC 3.63 10*6/mm3      Hemoglobin 12.3 g/dL      Hematocrit 35.5 %      MCV 97.8 fL      MCH 33.9 pg      MCHC 34.6 g/dL      RDW 14.5 %      RDW-SD 52.4 fl      MPV 11.4 fL      Platelets 100 10*3/mm3      Respiratory Panel PCR w/COVID-19(SARS-CoV-2) [facility] In-House, NP Swab in UTM/VTM, 3-4 HR TAT - Swab, Nasopharynx [[id number]]  (Normal) Collected: 11/05/20 1622    Specimen: Swab from Nasopharynx Updated: 11/05/20 1835     ADENOVIRUS, PCR Not Detected     Coronavirus 229E Not Detected     Coronavirus HKU1 Not Detected     Coronavirus NL63 Not Detected     Coronavirus OC43 Not Detected     COVID19 Not Detected     Human Metapneumovirus Not Detected     Human Rhinovirus/Enterovirus Not Detected     Influenza A PCR Not Detected     Influenza B PCR Not Detected     Parainfluenza Virus 1 Not Detected     Parainfluenza Virus 2 Not Detected     Parainfluenza Virus 3 Not Detected     Parainfluenza Virus 4 Not Detected     RSV, PCR Not Detected     Bordetella pertussis pcr Not Detected     Bordetella parapertussis PCR Not Detected     Chlamydophila pneumoniae PCR Not Detected     Mycoplasma pneumo by PCR Not Detected    Narrative:      Fact sheet for providers: https://docs.[employer]O[facility]/wp-content/uploads/BQY5696-8875-CZ4.1-EUA-Provider-Fact-Sheet-3.pdf    Fact sheet for patients: https://docs.[facility]/wp-content/uploads/IHQ1117-9453-MD4.1-EUA-Patient-Fact-Sheet-1.pdf    Troponin [[id number]]  (Abnormal) Collected: 11/05/20 1619    Specimen: Blood Updated: 11/05/20 1730     Troponin T 0.058 ng/mL     Narrative:      Troponin T Reference Range:  <= 0.03 ng/mL-   Negative for AMI  >0.03 ng/mL-     Abnormal for myocardial necrosis.  Clinicians would have to utilize clinical acumen, EKG, Troponin and serial changes to determine if it is an Acute Myocardial Infarction or myocardial injury due to an underlying chronic condition.       Results may be falsely decreased if patient taking Biotin.      Lactate Dehydrogenase [[id number]]  (Abnormal) Collected: 11/05/20 1619    Specimen: Blood Updated: 11/05/20 1730      U/L      Comment: Specimen hemolyzed.  Results may be affected.       Comprehensive  Metabolic Panel [[id number]]  (Abnormal) Collected: 11/05/20 1619    Specimen: Blood Updated: 11/05/20 1727     Glucose 99 mg/dL      BUN 38 mg/dL      Creatinine 1.27 mg/dL      Sodium 137 mmol/L      Potassium 4.0 mmol/L      Comment: Slight hemolysis detected by analyzer. Results may be affected.        Chloride 101 mmol/L      CO2 20.0 mmol/L      Calcium 8.5 mg/dL      Total Protein 5.1 g/dL      Albumin 2.60 g/dL      ALT (SGPT) 21 U/L      AST (SGOT) 34 U/L      Alkaline Phosphatase 104 U/L      Total Bilirubin 0.5 mg/dL      eGFR Non African Amer 39 mL/min/1.73      Globulin 2.5 gm/dL      A/G Ratio 1.0 g/dL      BUN/Creatinine Ratio 29.9     Anion Gap 16.0 mmol/L     Narrative:      GFR Normal >60  Chronic Kidney Disease <60  Kidney Failure <15      C-reactive Protein [[id number]]  (Abnormal) Collected: 11/05/20 1619    Specimen: Blood Updated: 11/05/20 1727     C-Reactive Protein 5.76 mg/dL     BNP [[id number]]  (Abnormal) Collected: 11/05/20 1619    Specimen: Blood Updated: 11/05/20 1723     proBNP 8,731.0 pg/mL     Narrative:      Among patients with dyspnea, NT-proBNP is highly sensitive for the detection of acute congestive heart failure. In addition NT-proBNP of <300 pg/ml effectively rules out acute congestive heart failure with 99% negative predictive value.    Results may be falsely decreased if patient taking Biotin.      Procalcitonin [[id number]]  (Normal) Collected: 11/05/20 1619    Specimen: Blood Updated: 11/05/20 1723     Procalcitonin 0.16 ng/mL     Narrative:      As a Marker for Sepsis (Non-Neonates):   1. <0.5 ng/mL represents a low risk of severe sepsis and/or septic shock.  1. >2 ng/mL represents a high risk of severe sepsis and/or septic shock.    As a Marker for Lower Respiratory Tract Infections that require antibiotic therapy:  PCT on Admission     Antibiotic Therapy             6-12 Hrs later  > 0.5                Strongly Recommended            >0.25 - <0.5         Recommended  0.1 -  "0.25           Discouraged                   Remeasure/reassess PCT  <0.1                 Strongly Discouraged          Remeasure/reassess PCT      As 28 day mortality risk marker: \"Change in Procalcitonin Result\" (> 80 % or <=80 %) if Day 0 (or Day 1) and Day 4 values are available. Refer to http://www.[employer]pct-calculator.com/   Change in PCT <=80 %   A decrease of PCT levels below or equal to 80 % defines a positive change in PCT test result representing a higher risk for 28-day all-cause mortality of patients diagnosed with severe sepsis or septic shock.  Change in PCT > 80 %   A decrease of PCT levels of more than 80 % defines a negative change in PCT result representing a lower risk for 28-day all-cause mortality of patients diagnosed with severe sepsis or septic shock.                Results may be falsely decreased if patient taking Biotin.     Ferritin [[id number]]  (Abnormal) Collected: 11/05/20 1619    Specimen: Blood Updated: 11/05/20 1723     Ferritin 446.00 ng/mL     Narrative:      Results may be falsely decreased if patient taking Biotin.      Manual Differential [[id number]]  (Abnormal) Collected: 11/05/20 1619    Specimen: Blood Updated: 11/05/20 1717     Neutrophil % 97.0 %      Lymphocyte % 1.0 %      Monocyte % 2.0 %      Neutrophils Absolute 10.11 10*3/mm3      Lymphocytes Absolute 0.10 10*3/mm3      Monocytes Absolute 0.21 10*3/mm3      Sinai Cells Large/3+     Poikilocytes Large/3+     WBC Morphology Normal     Platelet Morphology Normal    CBC & Differential [[id number]]  (Abnormal) Collected: 11/05/20 1619    Specimen: Blood Updated: 11/05/20 1702    Narrative:      The following orders were created for panel order CBC & Differential.  Procedure                               Abnormality         Status                     ---------                               -----------         ------                     CBC Auto Differential[[id number]]        Abnormal            Final result             "     Please view results for these tests on the individual orders.    CBC Auto Differential [[id number]]  (Abnormal) Collected: 11/05/20 1619    Specimen: Blood Updated: 11/05/20 1702     WBC 10.42 10*3/mm3      RBC 3.45 10*6/mm3      Hemoglobin 11.6 g/dL      Hematocrit 40.3 %      .8 fL      MCH 33.6 pg      MCHC 28.8 g/dL      RDW 14.4 %      RDW-SD 62.5 fl      MPV 11.9 fL      Platelets 107 10*3/mm3     Lactic Acid, Plasma [[id number]]  (Normal) Collected: 11/05/20 1619    Specimen: Blood Updated: 11/05/20 1650     Lactate 1.6 mmol/L         Imaging Results (Last 24 Hours)     Procedure Component Value Units Date/Time    XR Chest AP [[id number]] Collected: 11/05/20 1736     Updated: 11/05/20 1930    Narrative:      EMERGENCY PORTABLE VIEW OF THE CHEST 11/05/2020     CLINICAL HISTORY: Patient has fever and cough. Covid evaluation.      COMPARISON: This is correlated to prior chest x-ray on 09/18/2019 and  09/20/2019.     FINDINGS: The cardiomediastinal silhouette and pulmonary vasculature are  within normal limits. There is a coronary stent projecting over the left  superior heart border. There is some linear patchy airspace opacities of  both lung bases, I favor bibasilar atelectasis and/or scarring over  pneumonic infiltrate and this is similar to prior exam. There is minimal  blunting of the lateral costophrenic angles that may be due to  pleural-parenchymal scarring or small bilateral effusions. The mid and  upper lung zones are clear.       Impression:      There are linear patchy opacities of both lung bases that I favor is  bibasilar atelectasis or scarring as it is similar prior exam although  basilar pneumonia cannot be excluded and there is minimal blunting of  the lateral costophrenic angles that I favor is due to  pleural-parenchymal scarring or small bilateral effusions. Otherwise no  active disease is seen in the chest.     This report was finalized on 11/5/2020 7:27 PM by Dr. Marino Bustos,  M.D.              ECG 12 Lead               HEART RATE= 76  bpm  RR Interval= 785  ms  NM Interval=   ms  P Horizontal Axis=   deg  P Front Axis=   deg  QRSD Interval= 122  ms  QT Interval= 413  ms  QRS Axis= -48  deg  T Wave Axis= 120  deg  - ABNORMAL ECG -  Sinus rhythm  Frequent PACs- new  Left bundle branch block             Current Facility-Administered Medications:   •  aspirin chewable tablet 81 mg, 81 mg, Oral, Daily, Leon Chin MD, 81 mg at 11/06/20 1017  •  cholecalciferol (VITAMIN D3) tablet 1,000 Units, 1,000 Units, Oral, Nightly, Leon Chin MD  •  ferrous sulfate tablet 325 mg, 325 mg, Oral, Nightly, Leon Chin MD  •  furosemide (LASIX) injection 40 mg, 40 mg, Intravenous, Q12H, Leon Chin MD, 40 mg at 11/06/20 0001  •  guaiFENesin (MUCINEX) 12 hr tablet 600 mg, 600 mg, Oral, Q12H, Leon Chin MD, 600 mg at 11/06/20 0801  •  [START ON 11/7/2020] influenza vac split quad (FLUZONE,FLUARIX,AFLURIA,FLULAVAL) injection 0.5 mL, 0.5 mL, Intramuscular, Once, Leon Chin MD  •  isosorbide mononitrate (IMDUR) 24 hr tablet 30 mg, 30 mg, Oral, Daily, Leon Chin MD, 30 mg at 11/06/20 1017  •  levothyroxine (SYNTHROID, LEVOTHROID) tablet 75 mcg, 75 mcg, Oral, Q AM, Leon Chin MD, 75 mcg at 11/06/20 1017  •  magnesium oxide (MAG-OX) tablet 400 mg, 400 mg, Oral, Daily, Leon Chin MD, 400 mg at 11/06/20 1017  •  megestrol (MEGACE) tablet 40 mg, 40 mg, Oral, Daily, Leon Chin MD, 40 mg at 11/06/20 1023  •  melatonin tablet 5 mg, 5 mg, Oral, Nightly PRN, Leon Chin MD  •  metoprolol tartrate (LOPRESSOR) tablet 12.5 mg, 12.5 mg, Oral, Q12H, Leon Chin MD, 12.5 mg at 11/06/20 0657  •  montelukast (SINGULAIR) tablet 10 mg, 10 mg, Oral, Nightly, Leon Chin MD  •  multivitamin (THERAGRAN) tablet 1 tablet, 1 tablet, Oral, Daily, Leon Chin MD, 1 tablet at 11/06/20 1017  •  pantoprazole (PROTONIX) EC tablet 40 mg, 40 mg, Oral, Q AM, Leon Chin MD, 40 mg at 11/06/20 1017  •  potassium  chloride (KLOR-CON) packet 20 mEq, 20 mEq, Oral, BID, Joshua Melendez MD  •  [COMPLETED] Insert peripheral IV, , , Once **AND** sodium chloride 0.9 % flush 10 mL, 10 mL, Intravenous, PRN, Clayton Rene MD, 10 mL at 11/05/20 1612     ASSESSMENT  Acute on chronic diastolic CHF  Chest pain with elevated troponin rule out acute coronary syndrome  Hypertension  Hypothyroidism  Dementia  Osteoarthritis  Gastroesophageal reflux disease    PLAN  Admit  IV diuresis  Strict I's and O's and daily weight  Serial cardiac enzymes and EKG  Check 2D echo  Cardiology consult  Adjust home medications  Stress ulcer DVT prophylaxis  Supportive care  DNR  Discussed with family and nursing staff  Follow closely further recommendation current hospital course    JOSHUA MELENDEZ MD

## 2020-11-06 NOTE — PLAN OF CARE
Problem: Adult Inpatient Plan of Care  Goal: Plan of Care Review  Outcome: Ongoing, Progressing  Flowsheets (Taken 11/6/2020 1251)  Plan of Care Reviewed With: patient  Outcome Summary: Clinical swallow eval completed.  Pt with throat clearing noted with thin liquids, increased after trial of soft solids.  No throat clear noted with nectar thick liquids or soft solids.  Pt did not show sign of dislike of nectar thick liquids.  Discussed options with daughter.  Wishes to trial [facility] soft diet w/ ground meats, nectar thick liquids.  Declined VFSS and pureed foods (stated they might try that at home if needed).  Stated nutritional intake is top priority.  REC [facility] soft diet w/ ground meats.  Nectar thick liquids.  May have sips of unthickened water 30 mins after meals and oral care.  Meds w/ puree or nectar.   As nutrition is main goal of pt/daughter, if pt c/o nectar can d/c and return to thin liquids Will f/u for diet acceptance/tolerance and need for further education and/or testing.

## 2020-11-07 VITALS
RESPIRATION RATE: 16 BRPM | WEIGHT: 92.81 LBS | OXYGEN SATURATION: 97 % | TEMPERATURE: 97.8 F | SYSTOLIC BLOOD PRESSURE: 120 MMHG | HEART RATE: 75 BPM | HEIGHT: 62 IN | BODY MASS INDEX: 17.08 KG/M2 | DIASTOLIC BLOOD PRESSURE: 75 MMHG

## 2020-11-07 LAB
BASOPHILS # BLD AUTO: 0.03 10*3/MM3 (ref 0–0.2)
BASOPHILS NFR BLD AUTO: 0.3 % (ref 0–1.5)
CHOLEST SERPL-MCNC: 113 MG/DL (ref 0–200)
DEPRECATED RDW RBC AUTO: 57.7 FL (ref 37–54)
EOSINOPHIL # BLD AUTO: 0.12 10*3/MM3 (ref 0–0.4)
EOSINOPHIL NFR BLD AUTO: 1.3 % (ref 0.3–6.2)
ERYTHROCYTE [DISTWIDTH] IN BLOOD BY AUTOMATED COUNT: 15.4 % (ref 12.3–15.4)
HBA1C MFR BLD: 5.06 % (ref 4.8–5.6)
HCT VFR BLD AUTO: 36.8 % (ref 34–46.6)
HDLC SERPL-MCNC: 64 MG/DL (ref 40–60)
HGB BLD-MCNC: 12.5 G/DL (ref 12–15.9)
LDLC SERPL CALC-MCNC: 33 MG/DL (ref 0–100)
LDLC/HDLC SERPL: 0.51 {RATIO}
LYMPHOCYTES # BLD AUTO: 1.45 10*3/MM3 (ref 0.7–3.1)
LYMPHOCYTES NFR BLD AUTO: 15.2 % (ref 19.6–45.3)
MCH RBC QN AUTO: 34.2 PG (ref 26.6–33)
MCHC RBC AUTO-ENTMCNC: 34 G/DL (ref 31.5–35.7)
MCV RBC AUTO: 100.5 FL (ref 79–97)
MONOCYTES # BLD AUTO: 0.53 10*3/MM3 (ref 0.1–0.9)
MONOCYTES NFR BLD AUTO: 5.5 % (ref 5–12)
NEUTROPHILS NFR BLD AUTO: 7.37 10*3/MM3 (ref 1.7–7)
NEUTROPHILS NFR BLD AUTO: 77.1 % (ref 42.7–76)
NT-PROBNP SERPL-MCNC: ABNORMAL PG/ML (ref 0–1800)
PLATELET # BLD AUTO: 115 10*3/MM3 (ref 140–450)
PMV BLD AUTO: 11.6 FL (ref 6–12)
RBC # BLD AUTO: 3.66 10*6/MM3 (ref 3.77–5.28)
TRIGL SERPL-MCNC: 81 MG/DL (ref 0–150)
TROPONIN T SERPL-MCNC: 0.08 NG/ML (ref 0–0.03)
TSH SERPL DL<=0.05 MIU/L-ACNC: 3.79 UIU/ML (ref 0.27–4.2)
VLDLC SERPL-MCNC: 16 MG/DL (ref 5–40)
WBC # BLD AUTO: 9.56 10*3/MM3 (ref 3.4–10.8)

## 2020-11-07 PROCEDURE — 25010000002 FUROSEMIDE PER 20 MG: Performed by: HOSPITALIST

## 2020-11-07 PROCEDURE — 84443 ASSAY THYROID STIM HORMONE: CPT | Performed by: HOSPITALIST

## 2020-11-07 PROCEDURE — 83036 HEMOGLOBIN GLYCOSYLATED A1C: CPT | Performed by: HOSPITALIST

## 2020-11-07 PROCEDURE — 99232 SBSQ HOSP IP/OBS MODERATE 35: CPT | Performed by: INTERNAL MEDICINE

## 2020-11-07 PROCEDURE — 90686 IIV4 VACC NO PRSV 0.5 ML IM: CPT | Performed by: HOSPITALIST

## 2020-11-07 PROCEDURE — G0008 ADMIN INFLUENZA VIRUS VAC: HCPCS | Performed by: HOSPITALIST

## 2020-11-07 PROCEDURE — 83880 ASSAY OF NATRIURETIC PEPTIDE: CPT | Performed by: HOSPITALIST

## 2020-11-07 PROCEDURE — 93010 ELECTROCARDIOGRAM REPORT: CPT | Performed by: INTERNAL MEDICINE

## 2020-11-07 PROCEDURE — 25010000002 INFLUENZA VAC SPLIT QUAD 0.5 ML SUSPENSION PREFILLED SYRINGE: Performed by: HOSPITALIST

## 2020-11-07 PROCEDURE — 80061 LIPID PANEL: CPT | Performed by: HOSPITALIST

## 2020-11-07 PROCEDURE — 85025 COMPLETE CBC W/AUTO DIFF WBC: CPT | Performed by: HOSPITALIST

## 2020-11-07 PROCEDURE — 84484 ASSAY OF TROPONIN QUANT: CPT | Performed by: HOSPITALIST

## 2020-11-07 PROCEDURE — 93005 ELECTROCARDIOGRAM TRACING: CPT | Performed by: NURSE PRACTITIONER

## 2020-11-07 RX ORDER — POTASSIUM CHLORIDE 1.5 G/1.77G
20 POWDER, FOR SOLUTION ORAL 2 TIMES DAILY
Qty: 60 PACKET | Refills: 0 | Status: SHIPPED | OUTPATIENT
Start: 2020-11-07 | End: 2020-12-07

## 2020-11-07 RX ORDER — FUROSEMIDE 40 MG/1
40 TABLET ORAL DAILY
Status: DISCONTINUED | OUTPATIENT
Start: 2020-11-07 | End: 2020-11-07 | Stop reason: HOSPADM

## 2020-11-07 RX ORDER — FUROSEMIDE 40 MG/1
40 TABLET ORAL DAILY
Qty: 30 TABLET | Refills: 0 | Status: SHIPPED | OUTPATIENT
Start: 2020-11-07 | End: 2020-12-07

## 2020-11-07 RX ADMIN — ASPIRIN 81 MG: 81 TABLET, CHEWABLE ORAL at 09:55

## 2020-11-07 RX ADMIN — PANTOPRAZOLE SODIUM 40 MG: 40 TABLET, DELAYED RELEASE ORAL at 06:57

## 2020-11-07 RX ADMIN — Medication 1 TABLET: at 09:55

## 2020-11-07 RX ADMIN — METOPROLOL TARTRATE 12.5 MG: 25 TABLET ORAL at 06:57

## 2020-11-07 RX ADMIN — INFLUENZA VIRUS VACCINE 0.5 ML: 15; 15; 15; 15 SUSPENSION INTRAMUSCULAR at 13:53

## 2020-11-07 RX ADMIN — ISOSORBIDE MONONITRATE 30 MG: 30 TABLET ORAL at 09:55

## 2020-11-07 RX ADMIN — SODIUM CHLORIDE, PRESERVATIVE FREE 10 ML: 5 INJECTION INTRAVENOUS at 09:55

## 2020-11-07 RX ADMIN — POTASSIUM CHLORIDE 20 MEQ: 1.5 POWDER, FOR SOLUTION ORAL at 09:55

## 2020-11-07 RX ADMIN — FUROSEMIDE 40 MG: 10 INJECTION, SOLUTION INTRAMUSCULAR; INTRAVENOUS at 00:17

## 2020-11-07 RX ADMIN — MAGNESIUM OXIDE 400 MG (241.3 MG MAGNESIUM) TABLET 400 MG: TABLET at 09:55

## 2020-11-07 RX ADMIN — LEVOTHYROXINE SODIUM 75 MCG: 75 TABLET ORAL at 06:57

## 2020-11-07 NOTE — PROGRESS NOTES
"Daily progress note    Chief complaint  Doing better  No new complaints  Wants to go home  Family at bedside    History of present illness  89-year white female with history of hypertension CVA congestive heart failure who is legally blind and lives at home with her daughters brought to the emergency room when she was using the bathroom quite short of breath and then chest discomfort.  Patient denies any fever cough abdominal pain nausea vomiting diarrhea.  Patient work-up in ER revealed decompensated CHF admit for management.  Patient unable to give history most of history obtained from the daughter chart nursing staff and old record.  Patient is DNR per her wishes     REVIEW OF SYSTEMS  Unable to perform     PHYSICAL EXAM  Blood pressure 120/75, pulse 75, temperature 97.8 °F (36.6 °C), temperature source Temporal, resp. rate 16, height 157.5 cm (62\"), weight 42.1 kg (92 lb 13 oz), SpO2 97 %, unknown if currently breastfeeding.    Constitutional: Pt. is awake and mostly alert.  She is disoriented to place and time.  She is frail and elderly.  HENT: Normocephalic and atraumatic. Oropharynx moist/nonerythematous.  She had coughed up a large amount of clear sputum into her oropharynx and mask.  Neck: Normal range of motion. Neck supple. No JVD present. No tracheal deviation present. No thyromegaly present.   Cardiovascular: Normal rate, regular rhythm and normal heart sounds. No murmur heard.  Pulmonary/Chest: Tachypneic but overall good air exchange. No wheezes, no rales.   Abdominal: Soft. Bowel sounds are normal. No distension. There is no tenderness. There is no rebound and no guarding.   Musculoskeletal: No edema, tenderness or deformity.   Neurological: She is awake, face is symmetric.  Peripheral exam is nonlocalizing.  Skin: Had areas of ecchymoses and skin tears, especially in the lower extremities.      Skin is warm and dry. No rash noted. Pt. is not diaphoretic. No erythema.   Psychiatric: Unable to " assess    LAB RESULTS  Lab Results (last 24 hours)     Procedure Component Value Units Date/Time    Troponin [[id number]]  (Abnormal) Collected: 11/07/20 1031    Specimen: Blood Updated: 11/07/20 1123     Troponin T 0.084 ng/mL     Narrative:      Troponin T Reference Range:  <= 0.03 ng/mL-   Negative for AMI  >0.03 ng/mL-     Abnormal for myocardial necrosis.  Clinicians would have to utilize clinical acumen, EKG, Troponin and serial changes to determine if it is an Acute Myocardial Infarction or myocardial injury due to an underlying chronic condition.       Results may be falsely decreased if patient taking Biotin.      CBC & Differential [[id number]]  (Abnormal) Collected: 11/07/20 1042    Specimen: Blood Updated: 11/07/20 1121    Narrative:      The following orders were created for panel order CBC & Differential.  Procedure                               Abnormality         Status                     ---------                               -----------         ------                     CBC Auto Differential[[id number]]        Abnormal            Final result                 Please view results for these tests on the individual orders.    CBC Auto Differential [[id number]]  (Abnormal) Collected: 11/07/20 1042    Specimen: Blood Updated: 11/07/20 1121     WBC 9.56 10*3/mm3      RBC 3.66 10*6/mm3      Hemoglobin 12.5 g/dL      Hematocrit 36.8 %      .5 fL      MCH 34.2 pg      MCHC 34.0 g/dL      RDW 15.4 %      RDW-SD 57.7 fl      MPV 11.6 fL      Platelets 115 10*3/mm3      Neutrophil % 77.1 %      Lymphocyte % 15.2 %      Monocyte % 5.5 %      Eosinophil % 1.3 %      Basophil % 0.3 %      Neutrophils, Absolute 7.37 10*3/mm3      Lymphocytes, Absolute 1.45 10*3/mm3      Monocytes, Absolute 0.53 10*3/mm3      Eosinophils, Absolute 0.12 10*3/mm3      Basophils, Absolute 0.03 10*3/mm3     BNP [[id number]]  (Abnormal) Collected: 11/07/20 1031    Specimen: Blood Updated: 11/07/20 1119     proBNP 12,908.0 pg/mL      Narrative:      Among patients with dyspnea, NT-proBNP is highly sensitive for the detection of acute congestive heart failure. In addition NT-proBNP of <300 pg/ml effectively rules out acute congestive heart failure with 99% negative predictive value.    Results may be falsely decreased if patient taking Biotin.      TSH [[id number]]  (Normal) Collected: 11/07/20 1031    Specimen: Blood Updated: 11/07/20 1119     TSH 3.790 uIU/mL     Lipid Panel [[id number]]  (Abnormal) Collected: 11/07/20 1031    Specimen: Blood Updated: 11/07/20 1112     Total Cholesterol 113 mg/dL      Triglycerides 81 mg/dL      HDL Cholesterol 64 mg/dL      LDL Cholesterol  33 mg/dL      VLDL Cholesterol 16 mg/dL      LDL/HDL Ratio 0.51    Narrative:      Cholesterol Reference Ranges  (U.S. Department of Health and Human Services ATP III Classifications)    Desirable          <200 mg/dL  Borderline High    200-239 mg/dL  High Risk          >240 mg/dL      Triglyceride Reference Ranges  (U.S. Department of Health and Human Services ATP III Classifications)    Normal           <150 mg/dL  Borderline High  150-199 mg/dL  High             200-499 mg/dL  Very High        >500 mg/dL    HDL Reference Ranges  (U.S. Department of Health and Human Services ATP III Classifcations)    Low     <40 mg/dl (major risk factor for CHD)  High    >60 mg/dl ('negative' risk factor for CHD)        LDL Reference Ranges  (U.S. Department of Health and Human Services ATP III Classifcations)    Optimal          <100 mg/dL  Near Optimal     100-129 mg/dL  Borderline High  130-159 mg/dL  High             160-189 mg/dL  Very High        >189 mg/dL    Hemoglobin A1c [[id number]]  (Normal) Collected: 11/07/20 1031    Specimen: Blood Updated: 11/07/20 1104     Hemoglobin A1C 5.06 %     Narrative:      Hemoglobin A1C Ranges:    Increased Risk for Diabetes  5.7% to 6.4%  Diabetes                     >= 6.5%  Diabetic Goal                < 7.0%        Imaging Results (Last 24  Hours)     ** No results found for the last 24 hours. **           ECG 12 Lead               HEART RATE= 76  bpm  RR Interval= 785  ms  FL Interval=   ms  P Horizontal Axis=   deg  P Front Axis=   deg  QRSD Interval= 122  ms  QT Interval= 413  ms  QRS Axis= -48  deg  T Wave Axis= 120  deg  - ABNORMAL ECG -  Sinus rhythm  Frequent PACs- new  Left bundle branch block             Current Facility-Administered Medications:   •  artificial tears ophthalmic ointment, , Both Eyes, Q1H PRN, Leon Chin MD  •  aspirin chewable tablet 81 mg, 81 mg, Oral, Daily, Leon Chin MD, 81 mg at 11/07/20 0955  •  cholecalciferol (VITAMIN D3) tablet 1,000 Units, 1,000 Units, Oral, Nightly, Leon Chin MD, 1,000 Units at 11/06/20 2115  •  furosemide (LASIX) injection 40 mg, 40 mg, Intravenous, Q12H, Leon Chin MD, 40 mg at 11/07/20 0017  •  influenza vac split quad (FLUZONE,FLUARIX,AFLURIA,FLULAVAL) injection 0.5 mL, 0.5 mL, Intramuscular, Once, Leon Chin MD  •  isosorbide mononitrate (IMDUR) 24 hr tablet 30 mg, 30 mg, Oral, Daily, Leon Chin MD, 30 mg at 11/07/20 0955  •  levothyroxine (SYNTHROID, LEVOTHROID) tablet 75 mcg, 75 mcg, Oral, Q AM, Leon Chin MD, 75 mcg at 11/07/20 0657  •  magnesium oxide (MAG-OX) tablet 400 mg, 400 mg, Oral, Daily, Leon Chin MD, 400 mg at 11/07/20 0955  •  megestrol (MEGACE) tablet 40 mg, 40 mg, Oral, Daily, Leon Chin MD, Stopped at 11/07/20 1239  •  melatonin tablet 5 mg, 5 mg, Oral, Nightly PRN, Leon Chin MD, 5 mg at 11/06/20 2115  •  metoprolol tartrate (LOPRESSOR) tablet 12.5 mg, 12.5 mg, Oral, Q12H, Leon Chin MD, 12.5 mg at 11/07/20 0657  •  montelukast (SINGULAIR) tablet 10 mg, 10 mg, Oral, Nightly, Leon Chin MD, 10 mg at 11/06/20 2115  •  multivitamin (THERAGRAN) tablet 1 tablet, 1 tablet, Oral, Daily, Leon Chin MD, 1 tablet at 11/07/20 0955  •  pantoprazole (PROTONIX) EC tablet 40 mg, 40 mg, Oral, Q AM, eLon Chin MD, 40 mg at 11/07/20 0657  •   potassium chloride (KLOR-CON) packet 20 mEq, 20 mEq, Oral, BID, Joshua Melendez MD, 20 mEq at 11/07/20 0955  •  [COMPLETED] Insert peripheral IV, , , Once **AND** sodium chloride 0.9 % flush 10 mL, 10 mL, Intravenous, PRN, Clayton Rene MD, 10 mL at 11/07/20 0955     ASSESSMENT  Acute on chronic diastolic CHF  Chest pain with elevated troponin   Hypertension  Hypothyroidism  Dementia  Osteoarthritis  Gastroesophageal reflux disease    PLAN  Discharge home  Discharge summary dictated    JOSHUA MELENDEZ MD

## 2020-11-07 NOTE — NURSING NOTE
"Patients daughter is giving patient thin liquids outside of our diet protocol for necator thick- see patients dietary order.     I educated on the importance of compliance.  Patients daughter states. \"they told me I can do this along with her thick liquids\".    Cont to saud.   "

## 2020-11-07 NOTE — DISCHARGE SUMMARY
Discharge summary    Date of admission 11/5/2020  Date of discharge 11/7/2020    Final diagnosis  Acute on chronic diastolic CHF  Chest pain with elevated troponin   Hypertension  Hypothyroidism  Dementia  Osteoarthritis  Gastroesophageal reflux disease    Discharge medications    Current Facility-Administered Medications:   •  artificial tears ophthalmic ointment, , Both Eyes, Q1H PRN, Leon Chin MD  •  aspirin chewable tablet 81 mg, 81 mg, Oral, Daily, Leon Chin MD, 81 mg at 11/07/20 0955  •  cholecalciferol (VITAMIN D3) tablet 1,000 Units, 1,000 Units, Oral, Nightly, Leon Chin MD, 1,000 Units at 11/06/20 2115  •  furosemide (LASIX) tablet 40 mg, 40 mg, Oral, Daily, Leon Chin MD  •  influenza vac split quad (FLUZONE,FLUARIX,AFLURIA,FLULAVAL) injection 0.5 mL, 0.5 mL, Intramuscular, Once, Leon Chin MD  •  isosorbide mononitrate (IMDUR) 24 hr tablet 30 mg, 30 mg, Oral, Daily, Leon Chin MD, 30 mg at 11/07/20 0955  •  levothyroxine (SYNTHROID, LEVOTHROID) tablet 75 mcg, 75 mcg, Oral, Q AM, Leon Chin MD, 75 mcg at 11/07/20 0657  •  magnesium oxide (MAG-OX) tablet 400 mg, 400 mg, Oral, Daily, Leon Chin MD, 400 mg at 11/07/20 0955  •  megestrol (MEGACE) tablet 40 mg, 40 mg, Oral, Daily, Leon Chin MD, Stopped at 11/07/20 1239  •  melatonin tablet 5 mg, 5 mg, Oral, Nightly PRN, Leon Chin MD, 5 mg at 11/06/20 2115  •  metoprolol tartrate (LOPRESSOR) tablet 12.5 mg, 12.5 mg, Oral, Q12H, Leon Chin MD, 12.5 mg at 11/07/20 0657  •  montelukast (SINGULAIR) tablet 10 mg, 10 mg, Oral, Nightly, Leon Chin MD, 10 mg at 11/06/20 2115  •  multivitamin (THERAGRAN) tablet 1 tablet, 1 tablet, Oral, Daily, Leon Chin MD, 1 tablet at 11/07/20 0955  •  pantoprazole (PROTONIX) EC tablet 40 mg, 40 mg, Oral, Q AM, Leon Chin MD, 40 mg at 11/07/20 0657  •  potassium chloride (KLOR-CON) packet 20 mEq, 20 mEq, Oral, BID, Leon Chin MD, 20 mEq at 11/07/20 0955  •  [COMPLETED] Insert peripheral  IV, , , Once **AND** sodium chloride 0.9 % flush 10 mL, 10 mL, Intravenous, PRN, Clayton Rene MD, 10 mL at 11/07/20 0955     Consults obtained  Cardiology    Procedures  2D echo which showed ejection fraction 54%    Hospital course  89-year white female with history of diastolic CHF hypertension CVA who is legally blind lives at home with her daughters brought to the emergency room with increased shortness of breath and work-up revealed decompensated CHF.  Patient admitted treated with IV diuretics and repeat 2D echo showed ejection fraction 54%.  Patient is back to baseline and wants to go home.  Patient followed by cardiology and cleared for discharge.  Patient has elevated troponin with chest pain but no evidence of any acute MI per cardiology.  Patient clear to discharge on Lasix 40 p.o. daily    Discharge diet regular    Activity as tolerated    Medication as above    Follow-up with primary doctor in 1 week and follow-up with cardiology per their instruction and take medication as directed    JOSHUA MELENDEZ MD

## 2020-11-07 NOTE — PLAN OF CARE
Goal Outcome Evaluation:  Plan of Care Reviewed With: patient  Progress: no change     Patient resting comfortable throughout night.   She does have purewick in.  Daughter at bedside.    Lab came around to try to draw labs this AM it was unsuccessful- they will be back around this AM.    VSS    Cont to saud.

## 2020-11-07 NOTE — PROGRESS NOTES
Hospital Follow Up    LOS:  LOS: 2 days   Patient Name: Dinah Mcnamara  Age/Sex: 89 y.o. female  : 1922  MRN: [id number]    Day of Service: 20   Length of Stay: 2  Encounter Provider: Yeison Trevino MD  Place of Service: [facility] CARDIOLOGY  Patient Care Team:  Bartolo Gardner MD as PCP - General  Bartolo Gardner MD as PCP - Family Medicine    Subjective:     Chief Complaint: Shortness of breath.    Interval History: Patient is doing better this morning.  Did get some IV diuretics with improvement.    Objective:     Objective:  Temp:  [97.4 °F (36.3 °C)-97.5 °F (36.4 °C)] 97.5 °F (36.4 °C)  Heart Rate:  [69-96] 72  Resp:  [16-18] 18  BP: (113-135)/(55-82) 113/55     Intake/Output Summary (Last 24 hours) at 2020 0822  Last data filed at 2020 0603  Gross per 24 hour   Intake 440 ml   Output 600 ml   Net -160 ml     Body mass index is 16.98 kg/m².      20  1625 20  1320 20  0603   Weight: 43.1 kg (95 lb) 43.1 kg (95 lb) 42.1 kg (92 lb 13 oz)     Weight change: 0 kg (0 lb)      Physical Exam:   General : Alert, cooperative, in no acute distress.  Patient appears very frail and cachectic.  Her entire rib cage could be easily visualized.  Neuro: Alert,cooperative and oriented.  Lungs: CTAB. Normal respiratory effort and rate.  CV: Regular rate and rhythm, normal S1 and S2, no murmurs, gallops or rubs.  ABD: Soft, nontender, nondistended. Positive bowel sounds.  Extr: No edema or cyanosis, moves all extremities.    Lab Review:   Results from last 7 days   Lab Units 20  0822 20  1619   SODIUM mmol/L 142  138 137   POTASSIUM mmol/L 3.6  3.4* 4.0   CHLORIDE mmol/L 106  103 101   CO2 mmol/L 14.1*  17.2* 20.0*   BUN mg/dL 43*  42* 38*   CREATININE mg/dL 1.30*  1.29* 1.27*   GLUCOSE mg/dL 79  79 99   CALCIUM mg/dL 8.6  8.6 8.5   AST (SGOT) U/L 36* 34*   ALT (SGPT) U/L 26 21     Results from last 7 days   Lab Units  11/05/20  1619   TROPONIN T ng/mL 0.058*     Results from last 7 days   Lab Units 11/06/20  0822 11/05/20  1619   WBC 10*3/mm3 14.63* 10.42   HEMOGLOBIN g/dL 12.3 11.6*   HEMATOCRIT % 35.5 40.3   PLATELETS 10*3/mm3 100* 107*                   Invalid input(s): LDLCALC  Results from last 7 days   Lab Units 11/05/20  1619   PROBNP pg/mL 8,731.0*           Current Medications:   Scheduled Meds:aspirin, 81 mg, Oral, Daily  cholecalciferol, 1,000 Units, Oral, Nightly  furosemide, 40 mg, Intravenous, Q12H  influenza vaccine, 0.5 mL, Intramuscular, Once  isosorbide mononitrate, 30 mg, Oral, Daily  levothyroxine, 75 mcg, Oral, Q AM  magnesium oxide, 400 mg, Oral, Daily  megestrol, 40 mg, Oral, Daily  metoprolol tartrate, 12.5 mg, Oral, Q12H  montelukast, 10 mg, Oral, Nightly  multivitamin, 1 tablet, Oral, Daily  pantoprazole, 40 mg, Oral, Q AM  potassium chloride, 20 mEq, Oral, BID      Continuous Infusions:     Allergies:  Allergies   Allergen Reactions   • Augmentin [Amoxicillin-Pot Clavulanate]    • Codeine    • Hydralazine Hcl Other (See Comments)     NUMBNESS ALL OVER THE BODY   • Sulfa Antibiotics        Assessment:       Acute diastolic CHF (congestive heart failure) (CMS/[facility])        Plan:   1.  Coronary artery disease patient denies chest discomfort  2.  Diastolic congestive heart failure.  Now appears to be euvolemic and clinically back to normal.  3.  Patient has quite a bit of support at home.  She has 2 daughters that alternate taking care of her from 3 to 9 PM with outside assistance daily.  She is recently has physical therapy at home.  4.  I would discharge patient home today.  Personally I would discharge her on 20 mg a day and have her take an extra dose if her fluid status increases.  Other option would be to discharge on 40 mg and have her follow-up within a week.  5.  We will see patient on as-needed basis she appears back to baseline.  Call if questions or problems.  Patient has a cardiologist at [place]  and the daughter said she will probably take her back to them.        Yeison Trevino MD  11/07/20  08:22 EST

## 2020-11-08 ENCOUNTER — READMISSION MANAGEMENT (OUTPATIENT)
Dept: CALL CENTER | Facility: HOSPITAL | Age: 85
End: 2020-11-08

## 2020-11-08 NOTE — OUTREACH NOTE
Prep Survey      Responses   [facility] patient discharged from?  [facility]   Is LACE score < 7 ?  No   Eligibility  Readm Mgmt   Discharge diagnosis  Acute on chronic diastolic CHF   Does the patient have one of the following disease processes/diagnoses(primary or secondary)?  CHF   Does the patient have Home health ordered?  Yes   What is the Home health agency?   [facility]    Is there a DME ordered?  No   Prep survey completed?  Yes          Cyn Santiago RN

## 2020-11-09 NOTE — PROGRESS NOTES
Case Management Discharge Note      Final Note: Pt d/c home 11/7/2020 with [facility] scheduled to follow.  YOHAN NEAL/CCP    Provided Post Acute Provider List?: N/A  N/A Provider List Comment: Pt is current wit [facility] and [facility] [employer] in-home caregivers  Provided Post Acute Provider Quality & Resource List?: Refused  Refused Quality and Resource List Comment: daughter refused need for list.    Selected Continued Care - Discharged on 11/7/2020 Admission date: 11/5/2020 - Discharge disposition: Home or Self Care    Destination    No services have been selected for the patient.              Durable Medical Equipment    No services have been selected for the patient.              Dialysis/Infusion    No services have been selected for the patient.              Home Medical Care Coordination complete    Service Provider Selected Services Address Phone Fax    [facility] [address] [place], [place] [id number] [phone]          Therapy    No services have been selected for the patient.              Community Resources    No services have been selected for the patient.                       Final Discharge Disposition Code: 06 - home with home health care

## 2020-11-10 LAB — QT INTERVAL: 436 MS

## 2020-11-12 ENCOUNTER — READMISSION MANAGEMENT (OUTPATIENT)
Dept: CALL CENTER | Facility: HOSPITAL | Age: 85
End: 2020-11-12

## 2020-11-12 NOTE — OUTREACH NOTE
CHF Week 1 Survey      Responses   [facility] patient discharged from?  [place]   Does the patient have one of the following disease processes/diagnoses(primary or secondary)?  CHF   CHF Week 1 attempt successful?  Yes   Call start time  1114   Revoke  Change in health status-moved to LTC/SNF/Hospice [Stated they have decided to put her with hospice at this time]   Call end time  1116   Person spoke with today (if not patient) and relationship  Daughter/Marisachato Terrell LPN